# Patient Record
Sex: FEMALE | Race: WHITE | NOT HISPANIC OR LATINO | Employment: STUDENT | URBAN - METROPOLITAN AREA
[De-identification: names, ages, dates, MRNs, and addresses within clinical notes are randomized per-mention and may not be internally consistent; named-entity substitution may affect disease eponyms.]

---
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
## 2024-02-27 ENCOUNTER — TELEPHONE (OUTPATIENT)
Dept: TRANSPLANT | Facility: CLINIC | Age: 24
End: 2024-02-27
Payer: MEDICARE

## 2024-02-29 ENCOUNTER — TELEPHONE (OUTPATIENT)
Dept: TRANSPLANT | Facility: CLINIC | Age: 24
End: 2024-02-29
Payer: MEDICARE

## 2024-03-04 ENCOUNTER — TELEPHONE (OUTPATIENT)
Dept: TRANSPLANT | Facility: CLINIC | Age: 24
End: 2024-03-04
Payer: MEDICARE

## 2024-03-05 DIAGNOSIS — Z76.82 ORGAN TRANSPLANT CANDIDATE: Primary | ICD-10-CM

## 2024-03-19 ENCOUNTER — TELEPHONE (OUTPATIENT)
Dept: TRANSPLANT | Facility: CLINIC | Age: 24
End: 2024-03-19
Payer: MEDICARE

## 2024-03-20 ENCOUNTER — TELEPHONE (OUTPATIENT)
Dept: TRANSPLANT | Facility: CLINIC | Age: 24
End: 2024-03-20
Payer: MEDICARE

## 2024-03-20 NOTE — TELEPHONE ENCOUNTER
MA notes per Adherence form     PD PT    FOR THE PAST THREE MONTHS:    0-AMA's  0-No-shows    No concerns with care giving, transportation, or mental health    Per adherence form pts mother,brother,and father are very supportive.     Scanned in pt's media    Emily Cruz  Abdominal Transplant MA

## 2024-03-28 ENCOUNTER — HOSPITAL ENCOUNTER (OUTPATIENT)
Dept: RADIOLOGY | Facility: HOSPITAL | Age: 24
Discharge: HOME | End: 2024-03-28
Payer: MEDICARE

## 2024-03-28 ENCOUNTER — HOSPITAL ENCOUNTER (OUTPATIENT)
Dept: RADIOLOGY | Facility: HOSPITAL | Age: 24
Discharge: HOME | End: 2024-03-28
Attending: NURSE PRACTITIONER
Payer: MEDICARE

## 2024-03-28 ENCOUNTER — OFFICE VISIT (OUTPATIENT)
Dept: TRANSPLANT | Facility: CLINIC | Age: 24
End: 2024-03-28
Payer: MEDICARE

## 2024-03-28 ENCOUNTER — DOCUMENTATION ONLY (OUTPATIENT)
Dept: TRANSPLANT | Facility: CLINIC | Age: 24
End: 2024-03-28
Payer: MEDICARE

## 2024-03-28 VITALS
RESPIRATION RATE: 18 BRPM | TEMPERATURE: 97 F | OXYGEN SATURATION: 100 % | BODY MASS INDEX: 22.84 KG/M2 | HEIGHT: 62 IN | WEIGHT: 124.13 LBS | SYSTOLIC BLOOD PRESSURE: 120 MMHG | HEART RATE: 71 BPM | DIASTOLIC BLOOD PRESSURE: 82 MMHG

## 2024-03-28 DIAGNOSIS — Z76.82 ORGAN TRANSPLANT CANDIDATE: ICD-10-CM

## 2024-03-28 DIAGNOSIS — D63.1 ANEMIA IN CHRONIC KIDNEY DISEASE, ON CHRONIC DIALYSIS: ICD-10-CM

## 2024-03-28 DIAGNOSIS — N18.6 ANEMIA IN CHRONIC KIDNEY DISEASE, ON CHRONIC DIALYSIS: ICD-10-CM

## 2024-03-28 DIAGNOSIS — N18.6 HYPERPARATHYROIDISM DUE TO ESRD ON DIALYSIS: ICD-10-CM

## 2024-03-28 DIAGNOSIS — Z99.2 ESRD ON PERITONEAL DIALYSIS: ICD-10-CM

## 2024-03-28 DIAGNOSIS — N18.6 BENIGN HYPERTENSION WITH ESRD (END-STAGE RENAL DISEASE): ICD-10-CM

## 2024-03-28 DIAGNOSIS — Z01.818 PRE-TRANSPLANT EVALUATION FOR CHRONIC KIDNEY DISEASE: Primary | Chronic | ICD-10-CM

## 2024-03-28 DIAGNOSIS — N05.1 FSGS (FOCAL SEGMENTAL GLOMERULOSCLEROSIS): ICD-10-CM

## 2024-03-28 DIAGNOSIS — N25.81 HYPERPARATHYROIDISM DUE TO ESRD ON DIALYSIS: ICD-10-CM

## 2024-03-28 DIAGNOSIS — N18.6 ESRD ON PERITONEAL DIALYSIS: ICD-10-CM

## 2024-03-28 DIAGNOSIS — T86.12 FAILED KIDNEY TRANSPLANT: ICD-10-CM

## 2024-03-28 DIAGNOSIS — Z99.2 ANEMIA IN CHRONIC KIDNEY DISEASE, ON CHRONIC DIALYSIS: ICD-10-CM

## 2024-03-28 DIAGNOSIS — I12.0 BENIGN HYPERTENSION WITH ESRD (END-STAGE RENAL DISEASE): ICD-10-CM

## 2024-03-28 DIAGNOSIS — Z99.2 HYPERPARATHYROIDISM DUE TO ESRD ON DIALYSIS: ICD-10-CM

## 2024-03-28 PROBLEM — E89.2 HYPOPARATHYROIDISM AFTER PROCEDURE: Chronic | Status: RESOLVED | Noted: 2017-09-06 | Resolved: 2024-03-28

## 2024-03-28 PROBLEM — N18.9 CKD (CHRONIC KIDNEY DISEASE): Status: RESOLVED | Noted: 2017-08-20 | Resolved: 2024-03-28

## 2024-03-28 PROBLEM — Z98.890 S/P PERICARDIOCENTESIS: Status: RESOLVED | Noted: 2017-09-06 | Resolved: 2024-03-28

## 2024-03-28 PROBLEM — E05.90 HYPERTHYROIDISM: Chronic | Status: RESOLVED | Noted: 2017-09-06 | Resolved: 2024-03-28

## 2024-03-28 PROBLEM — D84.9 IMMUNOCOMPROMISED: Status: RESOLVED | Noted: 2017-08-20 | Resolved: 2024-03-28

## 2024-03-28 PROBLEM — D84.9 IMMUNOSUPPRESSION: Status: RESOLVED | Noted: 2017-09-06 | Resolved: 2024-03-28

## 2024-03-28 PROBLEM — R50.9 FEVER: Status: RESOLVED | Noted: 2017-09-08 | Resolved: 2024-03-28

## 2024-03-28 PROBLEM — I31.2 HEMOPERICARDIUM, NOT ELSEWHERE CLASSIFIED: Status: RESOLVED | Noted: 2017-09-06 | Resolved: 2024-03-28

## 2024-03-28 PROBLEM — I10 SEVERE UNCONTROLLED HYPERTENSION: Status: RESOLVED | Noted: 2017-08-20 | Resolved: 2024-03-28

## 2024-03-28 PROBLEM — J90 PLEURAL EFFUSION, LEFT: Status: RESOLVED | Noted: 2017-09-07 | Resolved: 2024-03-28

## 2024-03-28 PROCEDURE — 99214 OFFICE O/P EST MOD 30 MIN: CPT | Mod: PBBFAC,25,TXP | Performed by: NURSE PRACTITIONER

## 2024-03-28 PROCEDURE — 99204 OFFICE O/P NEW MOD 45 MIN: CPT | Mod: S$PBB,TXP,, | Performed by: TRANSPLANT SURGERY

## 2024-03-28 PROCEDURE — 99999 PR PBB SHADOW E&M-EST. PATIENT-LVL IV: CPT | Mod: PBBFAC,TXP,, | Performed by: NURSE PRACTITIONER

## 2024-03-28 PROCEDURE — 72170 X-RAY EXAM OF PELVIS: CPT | Mod: 26,TXP,, | Performed by: RADIOLOGY

## 2024-03-28 PROCEDURE — 99203 OFFICE O/P NEW LOW 30 MIN: CPT | Mod: S$PBB,TXP,, | Performed by: REGISTERED NURSE

## 2024-03-28 PROCEDURE — 76776 US EXAM K TRANSPL W/DOPPLER: CPT | Mod: 26,TXP,, | Performed by: RADIOLOGY

## 2024-03-28 PROCEDURE — 76770 US EXAM ABDO BACK WALL COMP: CPT | Mod: TC,TXP

## 2024-03-28 PROCEDURE — 71046 X-RAY EXAM CHEST 2 VIEWS: CPT | Mod: TC,TXP

## 2024-03-28 PROCEDURE — 99205 OFFICE O/P NEW HI 60 MIN: CPT | Mod: S$PBB,TXP,, | Performed by: NURSE PRACTITIONER

## 2024-03-28 PROCEDURE — 93978 VASCULAR STUDY: CPT | Mod: TC,TXP

## 2024-03-28 PROCEDURE — 72170 X-RAY EXAM OF PELVIS: CPT | Mod: TC,TXP

## 2024-03-28 PROCEDURE — 76770 US EXAM ABDO BACK WALL COMP: CPT | Mod: 26,TXP,, | Performed by: RADIOLOGY

## 2024-03-28 PROCEDURE — 93978 VASCULAR STUDY: CPT | Mod: 26,TXP,, | Performed by: RADIOLOGY

## 2024-03-28 PROCEDURE — 76776 US EXAM K TRANSPL W/DOPPLER: CPT | Mod: TC,TXP

## 2024-03-28 PROCEDURE — 71046 X-RAY EXAM CHEST 2 VIEWS: CPT | Mod: 26,TXP,, | Performed by: RADIOLOGY

## 2024-03-28 RX ORDER — CALCIUM CARBONATE 200(500)MG
2000 TABLET,CHEWABLE ORAL
COMMUNITY
Start: 2023-05-04 | End: 2024-05-03

## 2024-03-28 RX ORDER — CALCITRIOL 0.25 UG/1
0.75 CAPSULE ORAL DAILY
COMMUNITY
Start: 2024-02-22 | End: 2025-02-21

## 2024-03-28 RX ORDER — VIT B COMP NO.3/FOLIC/C/BIOTIN 1 MG-60 MG
1 TABLET ORAL EVERY MORNING
COMMUNITY
Start: 2024-02-06

## 2024-03-28 RX ORDER — LOSARTAN POTASSIUM 100 MG/1
100 TABLET ORAL DAILY
COMMUNITY
Start: 2023-06-05 | End: 2024-06-04

## 2024-03-28 RX ORDER — HEPARIN SODIUM 1000 [USP'U]/ML
5000 INJECTION, SOLUTION INTRAVENOUS; SUBCUTANEOUS
COMMUNITY
Start: 2024-02-22

## 2024-03-28 RX ORDER — NAPROXEN SODIUM 220 MG/1
81 TABLET, FILM COATED ORAL DAILY
COMMUNITY
Start: 2023-06-05

## 2024-03-28 RX ORDER — METOPROLOL SUCCINATE 100 MG/1
150 TABLET, EXTENDED RELEASE ORAL 2 TIMES DAILY
COMMUNITY
Start: 2023-08-04 | End: 2024-08-03

## 2024-03-28 RX ORDER — HYDRALAZINE HYDROCHLORIDE 50 MG/1
50 TABLET, FILM COATED ORAL 2 TIMES DAILY
COMMUNITY
Start: 2024-01-17

## 2024-03-28 RX ORDER — CINACALCET 30 MG/1
30 TABLET, FILM COATED ORAL 2 TIMES DAILY WITH MEALS
COMMUNITY
Start: 2024-02-22 | End: 2025-02-21

## 2024-03-28 RX ORDER — FOLIC ACID 1 MG/1
1000 TABLET ORAL DAILY
COMMUNITY

## 2024-03-28 RX ORDER — METHOXY POLYETHYLENE GLYCOL-EPOETIN BETA 100 UG/.3ML
150 INJECTION, SOLUTION INTRAVENOUS
COMMUNITY
Start: 2024-01-17

## 2024-03-28 NOTE — PROGRESS NOTES
PHARM.D. PRE-TRANSPLANT NOTE:    This patient's medication therapy was evaluated as part of her pre-transplant evaluation.      The following general pharmacologic concerns were noted: Aspirin will increase periop bleeding risk; previous kidney transplant from 2015 ([facility]) and 2019 ([facility]);     The following concerns for post-operative pain management were noted: N/A    The following pharmacologic concerns related to HCV therapy were noted: N/A      This patient's medication profile was reviewed for considerations for DAA Hepatitis C therapy:    [x]  No current inducers of CYP 3A4 or PGP  [x]  No amiodarone on this patient's EMR profile in the last 24 months  [x]  No past or current atrial fibrillation on this patient's EMR profile       Current Outpatient Medications   Medication Sig Dispense Refill    aspirin 81 MG Chew Take 81 mg by mouth once daily.      calcitRIOL (ROCALTROL) 0.25 MCG Cap Take 0.75 mcg by mouth once daily.      calcium carbonate (TUMS) 200 mg calcium (500 mg) chewable tablet Take 2,000 mg by mouth 3 (three) times daily with meals.      cinacalcet (SENSIPAR) 30 MG Tab Take 30 mg by mouth 2 (two) times daily with meals.      epoetin beta, methoxy peg (MIRCERA) 100 mcg/0.3 mL Syrg Inject 150 mcg as directed every 30 days.      folic acid (FOLVITE) 1 MG tablet Take 1,000 mcg by mouth once daily.      heparin sodium,porcine (HEPARIN, PORCINE,) 1,000 unit/mL injection 5,000 Units by Intra-Catheter route as needed. use as directed      hydrALAZINE (APRESOLINE) 50 MG tablet Take 50 mg by mouth 2 (two) times daily.      losartan (COZAAR) 100 MG tablet Take 100 mg by mouth once daily.      metoprolol succinate (TOPROL-XL) 100 MG 24 hr tablet Take 150 mg by mouth 2 (two) times daily.      pantoprazole (PROTONIX) 40 MG tablet       GOPAL-TORIE RX 1- mg-mg-mcg Tab Take 1 tablet by mouth every morning.       No current facility-administered medications for this visit.           I am available  for consultation and can be contacted, as needed by the other members of the Transplant team.

## 2024-03-28 NOTE — PROGRESS NOTES
Transplant Nephrology  Kidney Transplant Recipient Evaluation    Referring Physician: Bryan Wells  Current Nephrologist: Bryan Wells    Subjective:   CC:  Initial evaluation of kidney transplant candidacy.    HPI:  Ms. Gabriel is a 23 y.o. year old White female who has presented to be evaluated as a potential kidney transplant recipient.  She has ESRD secondary to FSGS and failed kidney txp x2.  (Txp #1 ~  at [facility] and txp #2 2020 at [place], [place]).   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 She received a renal transplant at [facility] in [place] a year ago in 2020. She is noted to have experienced a severe episode of rejection in May 2021 and has not recovered significant function since that time.  She was evaluated for declining renal function in 2021 and has opted for peritoneal dialysis. She had placement of peritoneal dialysis catheter 2021. She initiated peritoneal dialysis  on 1/10/2022.  Patient reports that she is tolerating dialysis well. She has a PD catheter for dialysis access.      Pt reports txp #1 at [facility] and txp #2 at [place], [place]   Pt reports both kidney txp remain in place, She is no longer on IS meds.     Had a bone marrow bx with txp #1 d/t leukocytosis--reports it was benign   She does not appear frail.   Donors: no, discussed living donation       Extracted 2017 ([facility])   Patient developed nephrotic syndrome ~ age 12 and  was admitted to [facility]  . 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 Approximately ,  she received a  donor transplant and did well for first year,     Her  Mom had to move from [facility] in [place] to near [place] and patient was staying with brothers over 18 years of age. She started missing doses of medications and within a year lost her kidney. She returned to PD 3 months ago.  Her BP was still uncontrolled and had a PRES/ seizure requiring PICU admission last month.     Txp kidney bx : 6/22/2021 ([facility])       DIAGNOSIS:  Acute T-Cell Mediated Rejection, [place]ff 2A.  Acute T-Cell Mediated Rejection, Banff 1B.       Seizure and HX PRES  Extracted 10/9/2017  Progress Notes    Francesca Molina MD - 10/09/2017   PEDIATRIC NEUROLOGY: CLINIC CONSULTATION   history of provoked seizures,  total of  4 seizures in her life, all secondary to hypocalcemia and/or severe hypertension. last seizure was in early August 2017, in the setting of hypertensive emergency.  She was previously seen by [facility] and was on Keppra 250mg for several months following a seizure, however this was stopped as her seizures were felt to be provoked. She has been off keppra since December 2015.     Per Dr Mathis's note, she has a history of PRES There has been a concern of noncompliance  - Provoked seizures and  antiepileptic medications are not indicated at this time.  Francesca Chairez MD      Recurrent UTIs 9/2/22   Pt underwent Urodynamic studies showing a decreased bladder capacity of 850 cc.   Detrusor pressures were around 50 cmH2O at her capacity.   Patient was unable to urinate once her bladder had capacity. Her catheters had to be removed in order for her to urinate. Even so, she still had to be intermittently Anterior bladder.  Good compliance bladder noted  but her capacity was low, which can be seen with anuria. At this point, would recommend that she go ahead and get her transplant. Would recommend that she start on oxybutynin immediately following her her surgery (on postop day 1). Would like to repeat the urodynamics once she is able to have a larger bladder capacity to assess for a hostile bladder at larger volumes   Electronically signed by Schexnayder, Kaitlen Sicard, MD at 09/02/2022 11/29/21 /ID Nikolas Rodriguez   4 episodes of UTI since transplant.  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  During these episodes her creatinine has persistently worsened. She is still producing urine. Because of these recurrent episodes she was started on doxycycline antibiotic suppression on 11/1/21.        Past Medical and Surgical History: Ms. Gabriel  has a past medical history of Anemia, Encounter for blood transfusion, FSGS (focal segmental glomerulosclerosis), Hypertension, Hyperthyroidism, and Kidney transplant status.  She has a past surgical history that includes Kidney transplant; Nephrectomy; Bone marrow biopsy; and Peritoneal catheter removal.    Past Social and Family History: Ms. Gabriel reports that she has never smoked. She does not have any smokeless tobacco history on file. She reports that she does not drink alcohol and does not use drugs. Her family history includes Arrhythmia in her father; Asthma in her maternal grandmother; Diabetes in her father and maternal grandmother; Heart disease in her maternal grandmother.    Past Medical History:   Diagnosis Date    Anemia     Encounter for blood transfusion     FSGS (focal segmental glomerulosclerosis)     Hypertension     Hyperthyroidism     Kidney transplant status     Kidney Rejection transplant kidney       Review of Systems   Constitutional:  Negative for activity change, appetite change, chills, fatigue, fever and unexpected weight change.   HENT:  Negative for congestion, facial swelling, postnasal drip, rhinorrhea, sinus pressure, sore throat and trouble swallowing.    Eyes:  Negative for pain, redness and visual disturbance.   Respiratory:  Negative for cough, chest tightness, shortness of breath and wheezing.    Cardiovascular: Negative.  Negative for chest pain, palpitations and leg swelling.   Gastrointestinal:  Negative for abdominal pain, diarrhea, nausea and vomiting.   Genitourinary:  Positive for decreased urine volume (anuric).  "Negative for dysuria, flank pain and urgency.   Musculoskeletal:  Negative for gait problem, neck pain and neck stiffness.   Skin:  Negative for rash.   Allergic/Immunologic: Positive for immunocompromised state. Negative for environmental allergies and food allergies.   Neurological:  Negative for dizziness, weakness, light-headedness and headaches.   Psychiatric/Behavioral:  Negative for agitation and confusion. The patient is not nervous/anxious.        Objective:   Blood pressure 120/82, pulse 71, temperature 97.3 °F (36.3 °C), temperature source Tympanic, resp. rate 18, height 5' 1.93" (1.573 m), weight 56.3 kg (124 lb 1.9 oz), SpO2 100 %.body mass index is 22.75 kg/m².    Physical Exam  Constitutional:       Appearance: Normal appearance. She is well-developed.   HENT:      Head: Normocephalic.      Nose: Nose normal.   Eyes:      Conjunctiva/sclera: Conjunctivae normal.      Pupils: Pupils are equal, round, and reactive to light.   Cardiovascular:      Rate and Rhythm: Normal rate and regular rhythm.      Heart sounds: Normal heart sounds.   Pulmonary:      Effort: Pulmonary effort is normal.      Breath sounds: Normal breath sounds.   Abdominal:      General: Bowel sounds are normal.      Palpations: Abdomen is soft. There is no hepatomegaly or splenomegaly.      Comments: PD catheter    Musculoskeletal:      Cervical back: Normal range of motion and neck supple.   Skin:     General: Skin is warm and dry.   Neurological:      Mental Status: She is alert and oriented to person, place, and time.   Psychiatric:         Behavior: Behavior normal.         Labs:  Lab Results   Component Value Date    WBC 6.28 03/28/2024    HGB 7.1 (L) 03/28/2024    HCT 23.4 (L) 03/28/2024     03/28/2024    K 4.5 03/28/2024    CL 97 03/28/2024    CO2 27 03/28/2024    BUN 33 (H) 03/28/2024    CREATININE 13.0 (H) 03/28/2024    EGFRNORACEVR 3.7 (A) 03/28/2024    CALCIUM 8.5 (L) 03/28/2024    PHOS 4.8 (H) 03/28/2024    MG 1.9 " "09/09/2017    ALBUMIN 2.9 (L) 03/28/2024    AST 15 03/28/2024    ALT 12 03/28/2024    .2 (H) 03/28/2024       No results found for: "PREALBUMIN", "BILIRUBINUA", "GGT", "AMYLASE", "LIPASE", "PROTEINUA", "NITRITE", "RBCUA", "WBCUA"    No results found for: "HLAABCTYPE"    Labs were reviewed with the patient.    Assessment:     1. Pre-transplant evaluation for chronic kidney disease    2. ESRD on peritoneal dialysis    3. Failed kidney transplant x2    4. FSGS (focal segmental glomerulosclerosis)    5. Benign hypertension with ESRD (end-stage renal disease)    6. Anemia in chronic kidney disease, on chronic dialysis    7. Hyperparathyroidism due to ESRD on dialysis        Plan:   Anemia: H/H: 7.9/16.7--Fax labs to dialysis/nephrologist concerning   optimizing--mgmt deferred  -Discuss urology recs post txp with committee at presentation   -UTD GYN/PAP per guidelines --follows with Dr Mckay at [facility]     Transplant Candidacy:   Based on available information, Ms. Gabriel is a high-risk kidney transplant candidate d/t HX 2 failed kidney txp and long time exposure to IS. .   Meets center eligibility for accepting HCV+ donor offer - Yes.  Patient educated on HCV+ donors. Anna is willing to accept HCV+ donor offer - Yes   Patient is a candidate for KDPI > 85 kidney donor offer - No.  Final determination of transplant candidacy will be made once workup is complete and reviewed by the selection committee.    Patient advised that it is recommended that all transplant candidates, and their close contacts and household members receive Covid vaccination.    UNOS Patient Status  Functional Status: 60% - Requires occasional assistance but is able to care for needs     Citlali Hatfield NP         "

## 2024-03-28 NOTE — PROGRESS NOTES
PRE-TRANSPLANT INFECTIOUS DISEASE CONSULT    Reason for Visit:  Pre-transplant evaluation  Referring Provider: Dr. Bryan Wells     History of Present Illness:    23 y.o. female with a history of ESRD on PD, hx renal transplant (12/2015) cb rejection (2016 onward), FSGS presents for pre-kidney transplant evaluation. Yes, pt has history of peritonitis, lastly in October 2023. Pt did not have full catheter exchange. Pt received intraperitoneal antibiotics. Pt with hx of cdiff in 2017.     Infectious History:  Recent hospital admissions: No  Recent infections: No  Recent or current antibiotic use: No  History of recurrent infections *(sinus / pneumonia / UTI / SBP)*: not recurrent. Pt had peritonitis x2, though last in October of 2023.   Recent dental infections, issues or procedures: No  History of chicken pox: Yes  History of shingles: No  History of STI: No  History of COVID infection: Yes    History of Immunosuppression:  Prior chemotherapy / immunosuppression: Yes, from 8976-3526  Prior transplant: Yes x2, dec 2, 2015 and dec 28, 2019.   History of splenectomy: No    Tuberculosis:  Prior screening for latent TB: Yes  Prior diagnosis of latent TB: No  Risk factors for TB *known exposure, incarceration, homelessness*: No    Geographical exposures:  Currently lives in [facility] with three roommates   Lived in the following states: [place]   Lived or travelled to the [facility] [place]: No  International travel: Yes, [place], [place]  Travel-associated illness: No    Social/Environmental:  Occupation:  college student, art major   Pets: Yes, 3 dogs and 2 cats in household, though pt has 2 dogs that she interacts with.   Livestock: No  Fishing / hunting: No  Hobbies: NA  Water: [place] water  Consumption of raw/undercooked meat or seafood?  Yes  Tobacco: No  Alcohol: Yes, 1 drink a year or so  Recreational drug use:  No  Sexual partners: NA      Past Histories:   Past Medical History:   Diagnosis Date     Anemia     Encounter for blood transfusion     FSGS (focal segmental glomerulosclerosis)     Hypertension     Hyperthyroidism     Kidney transplant status     Kidney Rejection transplant kidney     Past Surgical History:   Procedure Laterality Date    BONE MARROW BIOPSY      KIDNEY TRANSPLANT      NEPHRECTOMY      Transplant Kidney    PERITONEAL CATHETER REMOVAL       Family History   Problem Relation Age of Onset    Diabetes Father     Arrhythmia Father     Asthma Maternal Grandmother     Diabetes Maternal Grandmother     Heart disease Maternal Grandmother     Thyroid disease Neg Hx     Autoimmune disease Neg Hx      Social History     Tobacco Use    Smoking status: Never   Substance Use Topics    Alcohol use: No     Alcohol/week: 0.0 standard drinks of alcohol    Drug use: Never     Review of patient's allergies indicates:  No Known Allergies      Immunization History:  Received all childhood vaccines: Yes  All household members receive annual flu vaccine: Yes  All household members are up to date on COVID vaccine: Yes      Current antibiotics:  Antibiotics (From admission, onward)      None              Review of Systems  Review of Systems   Constitutional: Negative for chills, fever and weight loss.   Respiratory:  Negative for cough and hemoptysis.    Skin:  Negative for poor wound healing, rash and suspicious lesions.   Gastrointestinal:  Negative for diarrhea, nausea and vomiting.   Genitourinary:  Negative for dysuria and flank pain.          Objective  Physical Exam  Vitals reviewed.   Constitutional:       General: She is not in acute distress.     Appearance: Normal appearance. She is not ill-appearing.   HENT:      Nose: Nose normal.      Mouth/Throat:      Mouth: Mucous membranes are moist.      Pharynx: Oropharynx is clear.   Eyes:      General:         Right eye: No discharge.         Left eye: No discharge.      Conjunctiva/sclera: Conjunctivae normal.   Pulmonary:      Effort: Pulmonary effort is  "normal. No respiratory distress.      Breath sounds: No stridor.   Abdominal:      General: Abdomen is flat. There is no distension.      Palpations: Abdomen is soft.   Musculoskeletal:         General: Normal range of motion.      Cervical back: Normal range of motion.      Right lower leg: No edema.      Left lower leg: No edema.   Skin:     General: Skin is warm.      Findings: No erythema or rash.   Neurological:      General: No focal deficit present.      Mental Status: She is alert and oriented to person, place, and time.      Motor: No weakness.      Gait: Gait normal.   Psychiatric:         Mood and Affect: Mood normal.         Behavior: Behavior normal.         Thought Content: Thought content normal.         Judgment: Judgment normal.           Labs:    CBC:   Lab Results   Component Value Date    WBC 6.28 03/28/2024    HGB 7.1 (L) 03/28/2024    HCT 23.4 (L) 03/28/2024    MCV 96 03/28/2024     03/28/2024    GRAN 3.0 03/28/2024    GRAN 48.2 03/28/2024    LYMPH 1.1 03/28/2024    LYMPH 18.0 03/28/2024    MONO 0.5 03/28/2024    MONO 7.8 03/28/2024    EOSINOPHIL 24.4 (H) 03/28/2024       Syphilis screening: No results found for: "RPR", "PRPQ", "FTAABS"     TB screening: No results found for: "TBGOLDPLUS", "TSPOTSCREN"    HIV screening:   Lab Results   Component Value Date    [id number] Non-reactive 03/28/2024       Strongyloides IgG: No results found for: "STRONGANTIGG"    Hepatitis Serologies:   Lab Results   Component Value Date    HEPAIGG Reactive 03/28/2024    HEPBCAB Non-reactive 03/28/2024    HEPBSAB 569.53 03/28/2024    HEPBSAB Reactive 03/28/2024    HEPCAB Non-reactive 03/28/2024        Varicella IgG: No results found for: "VARICELLAINT"      Immunization History   Administered Date(s) Administered    COVID-19, MRNA, LN-S, PF (Pfizer) (Purple Cap) 02/19/2021, 03/12/2021, 10/18/2021          Assessment and Plan    1. Risks of Infection: Available serologies were reviewed. No unusual risks of " infection or significant barriers to transplantation were identified from the infectious disease standpoint given the information available at this time.    - Acute infectious issues: None   - Pending serologies: Quantiferon gold / T-spot, RPR, Strongyloides IgG, and VZV IgG   - Please call if any pending serologic testing is positive.    2. Immunizations:  Based on the patient's immunization history and serologies, the following immunizations are recommended:  - Hepatitis A    Patient does have immunity to hepatitis A    Vaccination ordered today: No. Reason for not ordering: immunity demonstrated on serology   - Hepatitis B    Patient does have immunity to hepatitis B    Vaccination ordered today: No. Reason for not ordering: Immunity   - COVID    Current [facility] vaccination recommendations were discussed with the patient   - Annual high dose influenza     Vaccination ordered today: Yes   - Prevnar 20    Vaccination ordered today: Yes   - Tdap    Vaccination ordered today: No. Reason for not ordering: vaccination up to date   - Shingrix    Vaccination ordered today: Yes    Recommended Pre-Transplant Immunization Schedule   Vaccine  0m 1m 2m 6m   Pneumococcal conjugate vaccine (Prevnar 20) X      Tetanus-diphtheria-pertussis (Tdap)* X      Hepatitis A Vaccine (Havrix)** X   X   Hepatitis B Vaccine (Heplisav)** X X     Influenza (annual) X      Zoster Recombinant Vaccine (Shingrix) X  X           *Administer booster every 10 years.       **Administer if no immunity demonstrated on serologies               Patient will receive vaccines at local pharmacy. A written prescription was provided for all vaccine doses.     3. Counseling:   I discussed with the patient the risk for increased susceptibility to infections following transplantation including increased risk for infection right after transplant and if rejection should occur.  The patient has been counseled on the importance of vaccinations to decrease risk of  infection and severe illness. Specific guidance has been provided to the patient regarding the patient's occupation, hobbies and activities to avoid future infectious complications.     4. Transplant Candidacy: Based on available information, there are no identified significant barriers to transplantation from an infectious disease standpoint.  Final determination of transplant candidacy will be made once evaluation is complete and reviewed by the Selection Committee.      Follow up with infectious disease as needed.       The total time for evaluation and management services performed on 03/28/2024 was greater than 30 minutes.

## 2024-03-28 NOTE — PROGRESS NOTES
Transplant Recipient Adult Psychosocial Assessment  2015 [facility],  donor kidney transplant,   2019 [facility],  donor kidney transplant  Pt is on PD at this time.    Anna Gabriel  [address]  [address]  [place] [address]  Telephone Information:   Mobile [phone]   Home  [phone] (home)  Work  There is no work phone number on file.  E-mail  [email or web address]    Sex: female  YOB: 2000  Age: 23 y.o.    Encounter Date: 3/28/2024  U[place] Citizen: yes  Primary Language: English   Needed: no  Transportation: pt reports drives self/own car    Emergency Contact:  Francesca Hatfield, 40 yo mother, [place], does drive/own car, works full time as . [phone]    Family/Social Support:   Number of dependents/: pt denies  Marital history: pt reports single, never   Other family dynamics: Pt reports both parents are living with mother Francesca Hatfield ([place]) assisting with transplant. Pt reports 2 brothers with 1 brother Edouard Blanco ([place]) will be assisting with transplant. Pt reports supportive uncle Beni Estevez ([place]) will be assisting with transplant.    Household Composition:  Pt reports lives in [facility] [place] with 3 roommates Carolyn Shipman, Florencia and Naomy. Pt reports is not close with her college roommates and they will not be assisting with transplant.    Do you and your caregivers have access to reliable transportation? yes  PRIMARY CAREGIVER: Francesca aHtfield, mother, will be primary caregiver, phone number [phone]      provided in-depth information to patient and caregiver regarding pre- and post-transplant caregiver role.   strongly encourages patient and caregiver to have concrete plan regarding post-transplant care giving, including back-up caregiver(s) to ensure care giving needs are met as needed.    Patient and Caregiver states understanding  all aspects of caregiver role/commitment and is able/willing/committed to being caregiver to the fullest extent necessary.    Patient and Caregiver verbalizes understanding of the education provided today and caregiver responsibilities.         remains available. Patient and Caregiver agree to contact  in a timely manner if concerns arise.      Able to take time off work without financial concerns: yes.     Additional Significant Others who will Assist with Transplant:  Edouard Blanco, 24 yo brother, [place], does drive/own car, works full time as recreational therapist . [phone]  Beni Estevez 42 yo uncle, [place], does drive/own car, works full time as network supervisor for [employer]. [phone]    Living Will: no  Healthcare Power of : no  Advance Directives on file: <<no information> per medical record.  Verbally reviewed LW/HCPA information.   provided patient with copy of LW/HCPA documents and provided education on completion of forms.    Living Donors: Education and resource information given to patient.    Highest Education Level: Attended College/Technical School Pt reports in last semester as Art Major and Marketing Minor at [employer] [place]. Pt reports will be graduating college May 2024  Reading Ability: college  Reports difficulty with: N/A  Learns Best By:  multisensory     Status: no  VA Benefits: no     Working for Income: No  If no, reason not working: Disability  Patient reports work history at [employer] ages 16 - 17 yo. 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 Pt reports ESRD and on PD at this time.    Monthly Income:  $914 disability check  Able to afford all costs now and if transplanted, including medications: yes  Patient and Caregiver  verbalizes understanding of personal responsibilities related to transplant costs and the importance of having a financial plan to ensure that patients transplant costs are fully covered.       provided fundraising information/education. Patient and Caregiververbalizes understanding.   remains available.    Insurance:   Payer/Plan Subscr  Sex Relation Sub. Ins. ID Effective Group Num   1. MEDICARE - [place] VIN CURIEL [birth year] Female Self [id number]                                    [address]   2. BLUE CROSS BL RIVAS [place] 1982 Female Child [id number]                                   [address] [address]     Primary Insurance (for UNOS reporting): Public Insurance - Medicare FFS (Fee For Service) ESRD Medicare book provided today.  Secondary Insurance (for UNOS reporting): Private Insurance BCBS through mother's job. Part D: Cigna. Pt reports [facility] for last transplant medicine prescriptions with no issues of affordability.  Patient and Caregiver verbalizes clear understanding that patient may experience difficulty obtaining and/or be denied insurance coverage post-surgery. This includes and is not limited to disability insurance, life insurance, health insurance, burial insurance, long term care insurance, and other insurances.      Patient and Caregiver also reports understanding that future health concerns related to or unrelated to transplantation may not be covered by patient's insurance.  Resources and information provided and reviewed.     Patient and Caregiver provides verbal permission to release any necessary information to outside resources for patient care and discharge planning.  Resources and information provided are reviewed.      Dialysis Adherence: Patient and Caregiver reports having high dialysis compliance with treatments and instructions within last 3 months.  3-20-24 Dialysis compliance update shows suitable dialysis  compliance.    Infusion Service: patient utilizing? Pt denies  Home Health: patient utilizing? Pt denies  DME: PD equipment and supplies  Pulmonary/Cardiac Rehab: pt denies  ADLS:  independent with self care and medication management (uses alarms for medication reminders) and does drive self/own car.    Adherence:   Previous history of medical non compliance with transplant medicines causing transplanted kidney rejection. Pt is on dialysis at this time. Pt reports her belief kidney rejection was from bladder infection that developed into a kidney infection which caused transplanted kidney failure/rejection. 3/20/24 dialysis compliance update shows suitable dialysis compliance within last 3 months. Adherence education and counseling provided. Recommend dialysis compliance update before transplant listing and surgery to show patient is maintaining medical compliance.     Per History Section:  Past Medical History:   Diagnosis Date    Anemia     Encounter for blood transfusion     FSGS (focal segmental glomerulosclerosis)     Hypertension     Hyperthyroidism     Kidney transplant status     Kidney Rejection transplant kidney     Social History     Tobacco Use    Smoking status: Never    Smokeless tobacco: Not on file   Substance Use Topics    Alcohol use: No     Alcohol/week: 0.0 standard drinks of alcohol     Social History     Substance and Sexual Activity   Drug Use Never     Social History     Substance and Sexual Activity   Sexual Activity Never       Per Today's Psychosocial:  Tobacco: none, patient denies any use.  Alcohol: none, patient denies any use.  Illicit Drugs/Non-prescribed Medications: none, patient denies any use.    Patient and Caregiver states clear understanding of the potential impact of substance use as it relates to transplant candidacy and is aware of possible random substance screening.  Substance abstinence/cessation counseling, education and resources provided and reviewed.      Arrests/DWI/Treatment/Rehab: patient denies    Psychiatric History:    Mental Health: Pt denies any mental health history or current mental health struggles. Pt denies any need for mental health referral at this time.  Psychiatrist/Counselor: pt denies  Medications:  pt denies  Suicide/Homicide Issues: pt denies any si/hi history at this time.  Safety at home: Pt reports living in safe home environment with no abuse at this time.    Knowledge: Patient and Caregiver states having clear understanding and realistic expectations regarding the potential risks and potential benefits of organ transplantation and organ donation and agrees to discuss with health care team members and support system members, as well as to utilize available resources and express questions and/or concerns in order to further facilitate the pt informed decision-making.  Resources and information provided and reviewed.    Patient and Caregiver is aware of [facility]'s affiliation and/or partnership with agencies in home health care, LTAC, SNF, [facility], and other hospitals and clinics.    Understanding: Patient and Caregiver reports having a clear understanding of the many lifetime commitments involved with being a transplant recipient, including costs, compliance, medications, lab work, procedures, appointments, concrete and financial planning, preparedness, timely and appropriate communication of concerns, abstinence (ETOH, tobacco, illicit non-prescribed drugs), adherence to all health care team recommendations, support system and caregiver involvement, appropriate and timely resource utilization and follow-through, mental health counseling as needed/recommended, and patient and caregiver responsibilities.  Social Service Handbook, resources and detailed educational information provided and reviewed.  Educational information provided.    Patient and Caregiver also reports current and expected compliance with health care regime and states having a  clear understanding of the importance of compliance.      Patient and Caregiver reports a clear understanding that risks and benefits may be involved with organ transplantation and with organ donation.       Patient and Caregiver also reports clear understanding that psychosocial risk factors may affect patient, and include but are not limited to feelings of depression, generalized anxiety, anxiety regarding dependence on others, post traumatic stress disorder, feelings of guilt and other emotional and/or mental concerns, and/or exacerbation of existing mental health concerns.  Detailed resources provided and discussed.      Patient and Caregiver agrees to access appropriate resources in a timely manner as needed and/or as recommended, and to communicate concerns appropriately.  Patient and Caregiver also reports a clear understanding of treatment options available.     Patient and Caregiver received education in a group setting.   reviewed education, provided additional information, and answered questions.    Feelings or Concerns: Pt reports high motivation to pursue kidney organ transplant at this time.    Coping: Identify Patient & Caregiver Strategies to [place]:   1. In the past, coping with major surgery and/or related stress - family support; enjoys making art work, especially ceramics.    2. Currently & Pre-transplant -  family support; enjoys making art work, especially ceramics.    3. At the time of surgery - family support   4. During post-Transplant & Recovery Period - family support    Goals: Pt reports hope for successful kidney organ transplant so she can discontinue dialysis and have healthier life. Patient referred to Vocational Rehabilitation.    Interview Behavior: Patient and Caregiver presents as alert and oriented x 4, pleasant, good eye contact, well groomed, recall good, concentration/judgement good, average intelligence, calm, communicative, cooperative, and asking and answering  questions appropriately. Pt's highly supportive mother Francesca in session with patient's permission.         Transplant Social Work - Candidacy  Assessment/Plan:     Psychosocial Suitability: Patient presents as medium to high risk candidate for kidney transplant at this time due to patient's significant documented medical history of medical non compliance which resulted in kidney organ transplant failure. Pt's 3-20-24 dialysis compliance update shows suitable dialysis compliance within last 3 months. Adherence education and counseling provided. Recommend dialysis compliance update before transplant listing and surgery to show patient is maintaining medical compliance. Pt reports having organ transplant caregiver/transportation plan, medical insurance plan and plan to afford transplant costs all in place.    Recommendations/Additional Comments: Recommend dialysis compliance update before transplant listing and surgery to show patient is maintaining medical compliance.     SW recommends that pt conduct fundraising to assist pt with pay for cost of medications, food, gas, and other transplant related needs.  SW recommends that pt remain aware of potential mental health concerns and contact the team if any concerns arise.  SW recommends that pt remain abstinent from tobacco, ETOH, and drug use.  SW supports pt's continued adherence. SW remains available to answer any questions or concerns that arise as the pt moves through the transplant process.      Final determination of transplant candidacy will be reviewed by the selection committee.      Yvette ABBASI LCSW

## 2024-03-28 NOTE — LETTER
April 1, 2024        Bryan Wells  [address]  [facility] [place] [address]  Phone: [phone]  Fax: [phone]             [facility] Buch[facility]- Transplant 1st Fl  [address]  [facility] [address]  Phone: [phone]   Patient: Anna Gabriel   MR Number: [id number]   YOB: 2000   Date of Visit: 3/28/2024       Dear Dr. Bryan Wells    Thank you for referring Anna Gabriel to me for evaluation. Attached you will find relevant portions of my assessment and plan of care.    If you have questions, please do not hesitate to call me. I look forward to following Anna Gabriel along with you.    Sincerely,    Citlali Hatfield, NP    Enclosure    If you would like to receive this communication electronically, please contact [email or web address] or [phone] to request MMIS Link access.    MMIS Link is a tool which provides read-only access to select patient information with whom you have a relationship. Its easy to use and provides real time access to review your patients record including encounter summaries, notes, results, and demographic information.    If you feel you have received this communication in error or would no longer like to receive these types of communications, please e-mail [email or web address]

## 2024-03-28 NOTE — PROGRESS NOTES
INITIAL PATIENT EDUCATION NOTE     Ms. Anna Gabriel was seen in pre-kidney transplant clinic for evaluation for kidney, kidney/pancreas or pancreas only transplant.  The patient attended an individual video education session that discussed/reviewed the following aspects of transplantation: evaluation including diagnostic and laboratory testing,(Chemistries, Hematology, Serologies including HIV and Hepatitis and HLA) required for transplantation and selection committee process, UNOS waitlist management/multiple listings, types of organs offered (KDPI < 85%, KDPI > 85%, PHS risk, DCD, HCV+, HIV+ for HIV+ recipients and enbloc/dual), financial aspects, surgical procedures, dietary instruction pre- and post-transplant, health maintenance pre- and post-transplant, post-transplant hospitalization and outpatient follow-up, potential to participate in a research protocol, and medication management and side effects.  A question and answer session was provided after the presentation.    The patient was seen by all members of the multi-disciplinary team to include: Nephrologist/GEOFF, Surgeon, , Transplant Coordinator, , Pharmacist and Dietician (if applicable).    The patient reviewed and signed all consents for evaluation which were witnessed and sent to scanning into the [facility] chart.    The patient was given an education book and plan for further evaluation based on her individual assessment.      Reviewed program requirement for complete COVID vaccination with documentation prior to listing.  COVID education information reviewed with patient. Patient encouraged to be up to date on all vaccinations.     The patient was informed that the transplant team would manage immediate post op pain. If the patient requires long term pain management, they will need to have that pain management addressed by their PCP or previous provider who wrote for long term pain medicines.    The patient was encouraged  to call with any questions or concerns.

## 2024-03-28 NOTE — PROGRESS NOTES
"Transplant Surgery  Kidney Transplant Recipient Evaluation    Referring Physician: Brayn Wells  Current Nephrologist: Bryan Wells    Subjective:     Reason for Visit: evaluate transplant candidacy    History of Present Illness: Anna Gabriel is a 23 y.o. year old female undergoing transplant evaluation.    Dialysis History: Anna is on peritoneal dialysis.      Transplant History: The patient has had two prior transplants.  She is very child-like and vague about her history.  Outside records state that the first kidney was lost after she started missing medication doses while living with older adult siblings, though she denies this.  The second kidney was done in Dec. 2019 and she returned to dialysis after a severe rejection in May 2020, though again she denies medication noncompliance and says that the kidney failed due to "a bladder infection that turned into a kidney infection".  Her mother also states that the second kidney failed from infection.    Of note, outside records indicate that her cPRA is 99%.    Etiology of Renal Disease: Other, Specify - Retransplant (based on medical records from referral).    External provider notes reviewed: Yes    Review of Systems  Objective:     Physical Exam:  Constitutional:   Vitals reviewed: yes   Well-nourished and well-groomed: yes  Eyes:   Sclerae icteric: no   Extraocular movements intact: yes  GI:    Bowel sounds normal: yes   Tenderness: no    If yes, quadrant/location: not applicable   Palpable masses: no    If yes, quadrant/location: not applicable   Hepatosplenomegaly: no   Ascites: no   Hernia: no    If yes, type/location: not applicable   Surgical scars: yes    If yes, bilateral prior kidney transplants and current PD catheter.  Resp:   Effort normal: yes   Breath sounds normal: yes    CV:   Regular rate and rhythm: yes   Heart sounds normal: yes   Femoral pulses normal: yes   Extremities edematous: no  Skin:   Rashes or lesions present: no    If yes, " describe:not applicable   Jaundice:: no    Musculoskeletal:   Gait normal: yes   Strength normal: yes  Psych:   Oriented to person, place, and time: yes   Affect and mood normal: yes    Additional comments: not applicable    Diagnostics:  The following labs have been reviewed: CBC  CMP  The following radiology images have been independently reviewed and interpreted:     Counseling: We provided Anna Gabriel with a group education session today.  We discussed kidney transplantation at length with her, including risks, potential complications, and alternatives in the management of her renal failure.  The discussion included complications related to anesthesia, bleeding, infection, primary nonfunction, and ATN.  I discussed the typical postoperative course, length of hospitalization, the need for long-term immunosuppression, and the need for long-term routine follow-up.  I discussed living-donor and -donor transplantation and the relative advantages and disadvantages of each.  I also discussed average waiting times for both living donation and  donation.  I discussed national and center-specific survival rates.  I also mentioned the potential benefit of multicenter listing to candidates listed with centers within more than one organ procurement organization.  All questions were answered.    Patient advised that it is recommended that all transplant candidates, and their close contacts and household members receive Covid vaccination.    Final determination of transplant candidacy will be made once evaluation is complete and reviewed by the Kidney & Kidney/Pancreas Selection Committee.    Coronavirus disease (COVID-19) caused by severe acute respiratory virus coronavirus 2 (SARS-C0V 2) is associated with increased mortality in solid organ transplant recipients (SOT) compared to non-transplant patients. Vaccine responses to vaccination are depressed against SARS-CoV2 compared to normal individuals but  improve with third vaccination doses. Vaccination prior to SOT provides both the best opportunity for transplant candidates to develop protective immunity and to reduce the risk of serious COVID19 infections post transplantation. Organ transplant candidates at [facility] will be required to receive SARS-CoV-2 vaccination prior to being listed with a an active status, whenever possible. Exceptions will be made for disability related reasons or for sincerely held Christian beliefs.          Transplant Surgery - Candidacy   Assessment/Plan:   Anna Gabriel has end stage renal disease (ESRD) on dialysis. I have concerns with her vague history and outside records indicating noncompliance, with little indication that she is now more reliable.  Also, it should be noted that she is highly sensitized with cPRA 99%.  Neither she nor her mother have ever heard anything about antibody sensitization, and they seemed shocked to here that she is unlikely to ever receive another transplant. Based on available information, Anna Gabriel is a high-risk kidney transplant candidate.     Additional testing to be completed according to the Written Order Guidelines for Adult Pre-kidney and Pancreas Transplant Evaluation (KI-02).  Interpretation of tests and discussion of patient management involves all members of the multidisciplinary transplant team.    Jeff Stern MD

## 2024-04-03 ENCOUNTER — DOCUMENTATION ONLY (OUTPATIENT)
Dept: TRANSPLANT | Facility: CLINIC | Age: 24
End: 2024-04-03
Payer: MEDICARE

## 2024-07-17 ENCOUNTER — TELEPHONE (OUTPATIENT)
Dept: TRANSPLANT | Facility: CLINIC | Age: 24
End: 2024-07-17
Payer: MEDICARE

## 2024-07-17 NOTE — TELEPHONE ENCOUNTER
Spoke to pt regarding 30 day letter for Stress Test. Pt stated she attempted to have it done three times but was unsuccessful due to blood pressure being too low, couldn't get an iv going. Pt stated she is going to [facility] which is closer to her to attempt to have it completed on 8/8. Pt advised we will need her or the doctor to fax records once completed. Pt also advised if she has it completed before then, let us know.

## 2024-08-26 ENCOUNTER — TELEPHONE (OUTPATIENT)
Dept: TRANSPLANT | Facility: CLINIC | Age: 24
End: 2024-08-26
Payer: MEDICARE

## 2024-08-26 NOTE — TELEPHONE ENCOUNTER
Spoke to pt regarding stress test. Test was completed on 8/8 at [facility]. Stress test in [place]. Pt stated her mother recently changed jobs and she currently does not have private insurance. Asked if that would be an issue and I explained I can not answer that question but I can transfer her to financial for assistance. Pt voiced understanding and was transferred to Waldemar Gallegos @ [phone].

## 2024-08-26 NOTE — TELEPHONE ENCOUNTER
Attempted to contact pt regarding incomplete stress test. Left detailed message with call back number.

## 2024-09-10 ENCOUNTER — TELEPHONE (OUTPATIENT)
Dept: TRANSPLANT | Facility: CLINIC | Age: 24
End: 2024-09-10
Payer: MEDICARE

## 2024-09-12 ENCOUNTER — SOCIAL WORK (OUTPATIENT)
Dept: TRANSPLANT | Facility: CLINIC | Age: 24
End: 2024-09-12
Payer: MEDICARE

## 2024-09-12 ENCOUNTER — TELEPHONE (OUTPATIENT)
Dept: TRANSPLANT | Facility: CLINIC | Age: 24
End: 2024-09-12
Payer: MEDICARE

## 2024-09-12 NOTE — PROGRESS NOTES
Pt to be presented in transplant selection committee 9-13-24.    Suitability: Patient presents as low to medium risk candidate for kidney transplant at this time due to patient's significant documented medical history of medical non compliance which resulted in kidney organ transplant failure. Adherence education and counseling provided. Pt's 3-20-24 and 9- dialysis compliance updates show suitable dialysis compliance within last 6 months. Recommend dialysis compliance update before transplant surgery to show patient is maintaining medical compliance. Pt reports having organ transplant caregiver/transportation plan, medical insurance plan and plan to afford transplant costs all in place.     Recommendations/Additional Comments: Recommend dialysis compliance update before transplant surgery to show patient is maintaining medical compliance.      SW recommends that pt conduct fundraising to assist pt with pay for cost of medications, food, gas, and other transplant related needs.  SW recommends that pt remain aware of potential mental health concerns and contact the team if any concerns arise.  SW recommends that pt remain abstinent from tobacco, ETOH, and drug use.  SW supports pt's continued adherence. SW remains available to answer any questions or concerns that arise as the pt moves through the transplant process.       Final determination of transplant candidacy will be reviewed by the selection committee.       Yvette Taylro MSW LCSW

## 2024-09-12 NOTE — TELEPHONE ENCOUNTER
MA notes per Adherence form     PD PT    FOR THE PAST THREE MONTHS:    0-AMA's  0-No-shows    No concerns with care giving, transportation, or mental health    Per adherence form, Pt will be an excellent candidate for transplant.  Pt is compliant with her treatment plan.     Scanned in pt's media    Emily Cruz  Abdominal Transplant MA

## 2024-09-13 ENCOUNTER — COMMITTEE REVIEW (OUTPATIENT)
Dept: TRANSPLANT | Facility: CLINIC | Age: 24
End: 2024-09-13
Payer: MEDICARE

## 2024-09-13 NOTE — LETTER
This communication is flagged as high priority.      September 13, 2024    Anna Gabriel  [address]  [place] [address]             Dear Jeromy Salcedo Eduardo J., MD (Inactive)    Patient: Anna Gabriel   MR Number: [id number]   YOB: 2000     A battery of tests must be done to determine if you are in suitable health to undergo a kidney transplant.  All  the recommended studies must be completed and received by the transplant team before you can be presented to the transplant selection committee. Once all your evaluation is complete the committee will then decide if you are a suitable transplant candidate.  The following studies need to be obtained at home:    _X__CT abdomen/pelvis with out contrast: ICD-10 code Z76.82.  this test is to assess surgical approach given that patient is being evaluated for a third kidney transplant      You and your doctor should feel free to contact us at any time, if there are questions or concerns about these tests or the transplant evaluation process.    Sincerely,    Kamila Dial MD  Medical Director, Kidney & Kidney/Pancreas Transplantation      [facility]  [address] [place] [id number]  [phone]    Cc: Dr. Manuel Wells

## 2024-09-13 NOTE — LETTER
This communication is flagged as high priority.    September 13, 2024    Anna Gabriel  1985 [address] [place] [address]        Dear Anna Gabriel:  MRN: [id number]    It is the duty of the [facility] Selection [facility] to determine which patients are candidates for a transplant. For this reason, our committee has the difficult task of evaluating patients to determine which ones have the greatest chance of having a successful transplant. We are aware of the magnitude of this responsibility, and we approach it with reverence and humility.    It is with regret I inform you that you are not approved as a transplant candidate due to  the need for additional testing and medical records review.  Will need to get a CT of the abdomen and pelvis without contrast to evaluate the blood supply to the kidneys .   Will need to obtain kidney biopsy reports and transplant notes from [facility] and [facility].  Based on this review, we have determined that at this time, you are not a candidate for a transplant at [facility].  When the above reports have been received and ready for committee review we will re-present your case to the Transplant Selection Committee for approval.    The selection committee carefully considers each patient's transplant candidacy and determines whether it is safe to proceed with transplantation on a case-by-case basis using established selection criteria.  At present, the risk of proceeding with an elective transplant surgery has become too high.                                                                               Although the selection committee believes you are not a suitable transplant candidate, you have the option to be evaluated at other transplant centers who may have different selection criteria.  You may request your [facility] records be sent to any center of your choice by contacting our Medical Records Department at [phone].                                                                                Attached is a letter from the [employer] Organ [employer]aring (UNOS).  It describes the services and information offered to patients by UN[employer] and the Organ Procurement and Transplant Network.    The [facility] sincerely wishes you the best and remains available to answer any questions.  Please do not hesitate to contact our pre-transplant office if we can assist you in any other way.                                                                               Sincerely,      Kamila Dial MD  Medical Director, Kidney & Kidney/Pancreas Transplantation    Cc: Manuel Wells MD    Encl: UNOS Letter               The Organ Procurement and Transplantation Network   Toll-free patient services line: [phone]  Your resource for organ transplant information      Staffed 8:30 am - 5:00 pm ET Monday - Friday   Leave a message 24/7 to receive a call back    The Organ Procurement and Transplantation Network (OPTN) is the national transplant system. It makes the policies that decide how donated organs are matched to patients waiting for a transplant. The OPTN:    Makes sure donated organs get matched to people on the transplant waiting list  Tells people about the donation and transplant processes  Makes sure that the public knows about the need for more organ and tissue donations    The OPTN has a free patient services line that you can call to:  Get more information about:   o Organ donation and organ transplants   o Donation and transplant policies  Get an information kit with:   o A list of transplant hospitals   o Waiting list information  Talk about any questions you may have about your transplant hospital or organ procurement organization. The staff will do their best to help you or point you to others who may help.  Find out how you can volunteer with the OPTN and help shape transplant policy    The patient services line  number is: 7-596-389-9496    Patient services line staff CANNOT answer questions about your own medical care, including:  Waiting list status  Test results  Medical records  You will need to call your transplant hospital for this information.    The following websites have more information about transplantation and donation:  OPTN: https://optn.transplant.hrsa.gov/  For potential living donors and transplant recipients:   o Living with transplant: https://www.transplantliving.org/   o Living donation process: https://optn.transplant.hrsa.gov/living-donation/     o Financial assistance: https://www.livingdonorassistance.org/  Transplantation data: https://www.srtr.org/  Organ donation: https://www.organdonor.gov/    Volunteer with the OPTN: https://optn.transplant.hrsa.gov/get-involved

## 2024-09-13 NOTE — COMMITTEE REVIEW
Native Organ Dx: Other, Specify - Retransplant         Unable to determine transplant candidacy at this time due to need to request medical records from [facility] and [facility] including  assessments and kidney biopsy reports for committee review.  Will also need to get  CT abdomen /pelvis without contrast to assess surgical approach given that patient is being evaluated for a third kidney transplant     Note written by Maylin Hess RN    ===============================================  I was present at the meeting and attest to the decision of the committee.    Yisel Odell, DO  Transplant Nephrology

## 2024-09-26 ENCOUNTER — TELEPHONE (OUTPATIENT)
Dept: TRANSPLANT | Facility: CLINIC | Age: 24
End: 2024-09-26
Payer: MEDICARE

## 2024-09-26 NOTE — TELEPHONE ENCOUNTER
Added to checklist.  ----- Message from Rula Payton MD sent at 9/16/2024  1:44 PM CDT -----      Hi, I called [facility] nephrologist and got this information. Please add this to her check list:      Transplant # 1 at [facility]: Transplant date:  12/28/2015    Donor type:  cadaveric            16 y.o. girl s/p DD renal Tx on 12/28/2015. 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 Has CKD stage 5.  Acute cellular/Humoral rejection 12/2016: treated with Thymoglobulin+High dose steroids+Plasmapheresis+Rituximab. She responded well to anti-rejection tx and her last serum Cr was 0.8 mg/dl on 12/27/16. She has no new complaints.       Acute cellular rejection 02/2017: Treated with high-dose steroids+Rituximab.       Acute AMR/Chronic AMR - 05/17 treated with pulse methylprednisolone, plasmapheresis, Thymoglobulin, Ritux.

## 2024-09-30 ENCOUNTER — TELEPHONE (OUTPATIENT)
Dept: TRANSPLANT | Facility: CLINIC | Age: 24
End: 2024-09-30
Payer: MEDICARE

## 2024-09-30 NOTE — TELEPHONE ENCOUNTER
Spoke to pt in regards to CT that is needed. Pt is having done locally at [facility] on 10/8. Pt advised we need the images on a CD. Will request once testing is completed.

## 2024-11-08 ENCOUNTER — TELEPHONE (OUTPATIENT)
Dept: TRANSPLANT | Facility: CLINIC | Age: 24
End: 2024-11-08
Payer: MEDICARE

## 2024-11-08 NOTE — TELEPHONE ENCOUNTER
----- Message from Margy Domínguez MD sent at 11/7/2024  9:25 PM CST -----  Previous kidney transplants appear to be on external iliacs bilaterally. Commons look ok.  ----- Message -----  From: Maylin Hess RN  Sent: 11/5/2024  10:03 AM CST  To: Margy Domínguez MD; #    Need Surgeon review for committee 11/08/24- thank you

## 2024-11-14 ENCOUNTER — TELEPHONE (OUTPATIENT)
Dept: TRANSPLANT | Facility: CLINIC | Age: 24
End: 2024-11-14
Payer: MEDICARE

## 2024-11-15 ENCOUNTER — TELEPHONE (OUTPATIENT)
Dept: TRANSPLANT | Facility: CLINIC | Age: 24
End: 2024-11-15
Payer: MEDICARE

## 2024-11-15 ENCOUNTER — COMMITTEE REVIEW (OUTPATIENT)
Dept: TRANSPLANT | Facility: CLINIC | Age: 24
End: 2024-11-15
Payer: MEDICARE

## 2024-11-15 NOTE — COMMITTEE REVIEW
Native Organ Dx: Other, Specify - Retransplant    Anna Gabriel was re-presented to the Transplant Selection Committee on 11/15/24.    Unable to determine transplant candidacy at this time due to the need for clarification and confirmation of caregiving plan (must bring backup caregiver to clinic).   Bring pt back to clinic for re-assessment by Transplant Nephrology and Surgeon.  If pt is still making urine will need referral to Urology.      Note written by Maylin Hess RN    ===============================================    I was present at the meeting and attest to the general consensus of the committee.   Colton Dos Santos Jr.

## 2025-01-13 ENCOUNTER — TELEPHONE (OUTPATIENT)
Dept: TRANSPLANT | Facility: CLINIC | Age: 25
End: 2025-01-13
Payer: MEDICARE

## 2025-01-13 NOTE — TELEPHONE ENCOUNTER
Returned call, explained that she was not denied, more information was needed to determine her candidacy for kidney transplant.  We have the requested reports and imaging for committee review, her case will be re-presented  after she returns to clinic for reassessment by Nephrology and Surgeon and confirmation of caregiving plan.    Pt reports change in dialysis clinic,    [facility] in [place].  ----- Message from [employer] sent at 1/13/2025  1:36 PM CST -----  Regarding: Consult/Advisory  Contact: [phone]  Consult/Advisory     Name Of Caller: pt         Contact Preference:   [phone]     Nature of call: Pt requesting reason for denial, please call to advise thank you

## 2025-01-14 ENCOUNTER — TELEPHONE (OUTPATIENT)
Dept: TRANSPLANT | Facility: CLINIC | Age: 25
End: 2025-01-14
Payer: MEDICARE

## 2025-01-16 ENCOUNTER — TELEPHONE (OUTPATIENT)
Dept: TRANSPLANT | Facility: CLINIC | Age: 25
End: 2025-01-16
Payer: MEDICARE

## 2025-01-16 NOTE — TELEPHONE ENCOUNTER
----- Message from Med Assistant Diaz sent at 1/16/2025  8:26 AM CST -----  Eyal Carlisle, if we can get patients dialysis unit updated please, thanks so much. I have attached my note.   Emily

## 2025-01-16 NOTE — TELEPHONE ENCOUNTER
MA notes per Adherence form     PD PT    FOR 1 Month due to change of clinics please see message below :    0-AMA's  0-No-shows    No concerns with care giving, transportation, or mental health    Pt is now with   [facility] (clinic [id number])  [address] [place] [address] [place] [address]   [phone] fax [phone]    Admit date 12/2/24  PD PT    Per adherence form : Records from previous clinic for months 10/24 and 11/24 were requested but have not received. Will be sent over asa.     Scanned in pt's media    Emily Cruz  Abdominal Transplant MA

## 2025-02-07 ENCOUNTER — TELEPHONE (OUTPATIENT)
Dept: TRANSPLANT | Facility: CLINIC | Age: 25
End: 2025-02-07
Payer: MEDICARE

## 2025-02-07 NOTE — TELEPHONE ENCOUNTER
Spoke to pt in regards to clinic visit. Confirmed date, time and location. Reminder mailed. Pt voiced understanding.

## 2025-03-25 ENCOUNTER — OFFICE VISIT (OUTPATIENT)
Dept: TRANSPLANT | Facility: CLINIC | Age: 25
End: 2025-03-25
Payer: MEDICARE

## 2025-03-25 VITALS
DIASTOLIC BLOOD PRESSURE: 114 MMHG | HEIGHT: 62 IN | RESPIRATION RATE: 16 BRPM | WEIGHT: 144.63 LBS | SYSTOLIC BLOOD PRESSURE: 160 MMHG | BODY MASS INDEX: 26.61 KG/M2 | OXYGEN SATURATION: 100 % | TEMPERATURE: 97 F | HEART RATE: 75 BPM

## 2025-03-25 DIAGNOSIS — N18.6 ANEMIA IN CHRONIC KIDNEY DISEASE, ON CHRONIC DIALYSIS: ICD-10-CM

## 2025-03-25 DIAGNOSIS — Z99.2 HYPERPARATHYROIDISM DUE TO ESRD ON DIALYSIS: ICD-10-CM

## 2025-03-25 DIAGNOSIS — T86.12 FAILED KIDNEY TRANSPLANT: ICD-10-CM

## 2025-03-25 DIAGNOSIS — Z01.818 PRE-TRANSPLANT EVALUATION FOR CHRONIC KIDNEY DISEASE: Primary | ICD-10-CM

## 2025-03-25 DIAGNOSIS — N18.6 HYPERPARATHYROIDISM DUE TO ESRD ON DIALYSIS: ICD-10-CM

## 2025-03-25 DIAGNOSIS — N18.6 BENIGN HYPERTENSION WITH ESRD (END-STAGE RENAL DISEASE): ICD-10-CM

## 2025-03-25 DIAGNOSIS — I12.0 BENIGN HYPERTENSION WITH ESRD (END-STAGE RENAL DISEASE): ICD-10-CM

## 2025-03-25 DIAGNOSIS — Z99.2 ANEMIA IN CHRONIC KIDNEY DISEASE, ON CHRONIC DIALYSIS: ICD-10-CM

## 2025-03-25 DIAGNOSIS — Z99.2 ESRD ON PERITONEAL DIALYSIS: ICD-10-CM

## 2025-03-25 DIAGNOSIS — N18.6 ESRD ON PERITONEAL DIALYSIS: ICD-10-CM

## 2025-03-25 DIAGNOSIS — N25.81 HYPERPARATHYROIDISM DUE TO ESRD ON DIALYSIS: ICD-10-CM

## 2025-03-25 DIAGNOSIS — N05.1 FSGS (FOCAL SEGMENTAL GLOMERULOSCLEROSIS): ICD-10-CM

## 2025-03-25 DIAGNOSIS — E88.09 HYPOALBUMINEMIA: ICD-10-CM

## 2025-03-25 DIAGNOSIS — D63.1 ANEMIA IN CHRONIC KIDNEY DISEASE, ON CHRONIC DIALYSIS: ICD-10-CM

## 2025-03-25 PROCEDURE — 99204 OFFICE O/P NEW MOD 45 MIN: CPT | Mod: S$PBB,TXP,, | Performed by: SURGERY

## 2025-03-25 PROCEDURE — 99215 OFFICE O/P EST HI 40 MIN: CPT | Mod: S$PBB,TXP,, | Performed by: NURSE PRACTITIONER

## 2025-03-25 PROCEDURE — 99999 PR PBB SHADOW E&M-EST. PATIENT-LVL IV: CPT | Mod: PBBFAC,TXP,, | Performed by: NURSE PRACTITIONER

## 2025-03-25 PROCEDURE — 99214 OFFICE O/P EST MOD 30 MIN: CPT | Mod: PBBFAC,TXP | Performed by: NURSE PRACTITIONER

## 2025-03-25 RX ORDER — ERGOCALCIFEROL 1.25 MG/1
50000 CAPSULE ORAL
COMMUNITY

## 2025-03-25 NOTE — LETTER
March 27, 2025        Bryan Wells  [address]  [facility] [place] [address]  Phone: [phone]  Fax: [phone]             [facility]- Transplant 1st Fl  [address]  [facility] [address]  Phone: [phone]   Patient: Anna Gabriel   MR Number: [id number]   YOB: 2000   Date of Visit: 3/25/2025       Dear Dr. Bryan Wells    Thank you for referring Anna Gabriel to me for evaluation. Attached you will find relevant portions of my assessment and plan of care.    If you have questions, please do not hesitate to call me. I look forward to following Anna Gabriel along with you.    Sincerely,    Citlali Hatfield, NP    Enclosure    If you would like to receive this communication electronically, please contact [email or web address] or [phone] to request [employer] Link access.    [employer] Link is a tool which provides read-only access to select patient information with whom you have a relationship. Its easy to use and provides real time access to review your patients record including encounter summaries, notes, results, and demographic information.    If you feel you have received this communication in error or would no longer like to receive these types of communications, please e-mail [email or web address]

## 2025-03-25 NOTE — PROGRESS NOTES
Transplant Recipient Adult Psychosocial Assessment  2015 [facility],  donor kidney transplant,   2019 [facility],  donor kidney transplant  Pt is on PD at this time.    Anna Gabriel  [address]  [place] [address]  Telephone Information:   Mobile [phone]   Home  [phone] (home)  Work  There is no work phone number on file.  E-mail  [email or web address]    Sex: female  YOB: 2000  Age: 24 y.o.    Encounter Date: 3/25/2025  [place] Citizen: yes  Primary Language: English   Needed: no  Transportation: pt reports drives self/own car    Emergency Contact:  Francesca Hatfield, 41 yo mother, [place], does drive/own car, works full time as . [phone]    Family/Social Support:   Number of dependents/: pt denies  Marital history: pt reports single, never   Other family dynamics: Pt reports both parents are living with mother Francesca Hatfield ([place]) assisting with transplant. Pt reports 2 brothers with 1 brother Edouard Blanco ([place]) will be assisting with transplant. Pt reports supportive uncle Beni Estevez ([place]) will be assisting with transplant.    Household Composition:  Pt resides with mother     Do you and your caregivers have access to reliable transportation? yes  PRIMARY CAREGIVER: Francesca Hatfield, mother, will be primary caregiver, phone number [phone]      provided in-depth information to patient and caregiver regarding pre- and post-transplant caregiver role.   strongly encourages patient and caregiver to have concrete plan regarding post-transplant care giving, including back-up caregiver(s) to ensure care giving needs are met as needed.    Patient and Caregiver states understanding all aspects of caregiver role/commitment and is able/willing/committed to being caregiver to the fullest extent necessary.    Patient and Caregiver verbalizes understanding of the education  provided today and caregiver responsibilities.         remains available. Patient and Caregiver agree to contact  in a timely manner if concerns arise.      Able to take time off work without financial concerns: yes.     Additional Significant Others who will Assist with Transplant:  Edouard Blanco, 26 yo brother, [place], does drive/own car, works full time as recreational therapist . [phone]  Beni Estevez 44 yo uncle, [place], does drive/own car, works full time as network supervisor for [employer]. [phone]    Living Will: no  Healthcare Power of : no Pt reports trusting mother, brother Edouadr and uncle Beni with medical decisions as needed   Advance Directives on file: <<no information> per medical record.  Verbally reviewed LW/HCPA information.   provided patient with copy of LW/HCPA documents and provided education on completion of forms.    Living Donors: Education and resource information given to patient.    Highest Education Level: Associate/Bachelor Degree   Reading Ability: college  Reports difficulty with: N/A  Learns Best By:  multisensory     Status: no  VA Benefits: no     Working for Income: No  If no, reason not working: Disability  Patient reports work history at [employer] ages 16 - 19 yo. 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 Pt reports ESRD and on PD at this time.    Monthly Income:  $950 disability check  Able to afford all costs now and if transplanted, including medications: yes  Patient and Caregiver verbalizes understanding of personal responsibilities related to transplant costs and the importance of having a financial plan to ensure that patients transplant costs are fully covered.       provided fundraising information/education. Patient and Caregiver  verbalizes understanding.   remains available.    Insurance:   Payer/Plan Subscr  Sex Relation Sub. Ins. ID Effective Group Num   1. MEDICARE - [place] MAR 10/15/00 Female Self [id number]                                    [address]   2. [place] MAR 10/15/00 Female Self [id number]                                    [address] [address]       Primary Insurance (for UNOS reporting): Public Insurance - Medicare FFS (Fee For Service) and Medicaid  Secondary Insurance (for UNOS reporting): Private Insurance BCBS through mother's job.     Patient and Caregiver verbalizes clear understanding that patient may experience difficulty obtaining and/or be denied insurance coverage post-surgery. This includes and is not limited to disability insurance, life insurance, health insurance, burial insurance, long term care insurance, and other insurances.      Patient and Caregiver also reports understanding that future health concerns related to or unrelated to transplantation may not be covered by patient's insurance.  Resources and information provided and reviewed.     Patient and Caregiver provides verbal permission to release any necessary information to outside resources for patient care and discharge planning.  Resources and information provided are reviewed.      Dialysis Adherence: Patient and Caregiver reports having high dialysis compliance with treatments and instructions within last 3 months.   MA notes per Adherence form   PD PT   FOR 1 Month due to change of clinics please see message below :  0-AMA's  0-No-shows  No concerns with care giving, transportation, or mental health  Pt is now with   [facility] (clinic 8795)  [address]  [place] [address]   [phone] fax [phone]  Per adherence form : Records from previous clinic for months 10/24 and  were requested but have not received. Will be sent over relocality.    Emily  Anthony  Abdominal Transplant MA     Infusion Service: patient utilizing? Pt denies  Home Health: patient utilizing? Pt denies  DME: PD equipment and supplies  Pulmonary/Cardiac Rehab: pt denies  ADLS:  independent with self care and medication management (uses alarms for medication reminders) and does drive self/own car.    Adherence:   Previous history of medical non compliance with transplant medicines causing transplanted kidney rejection. Pt is on dialysis at this time. Pt reports her belief kidney rejection was from bladder infection that developed into a kidney infection which caused transplanted kidney failure/rejection. 3/20/24 dialysis compliance update shows suitable dialysis compliance within last 3 months.     Adherence education and counseling provided. Recommend dialysis compliance update before transplant listing and surgery to show patient is maintaining medical compliance.     Per History Section:  Past Medical History:   Diagnosis Date    Anemia     Encounter for blood transfusion     FSGS (focal segmental glomerulosclerosis)     Hypertension     Hyperthyroidism     Kidney transplant status     Kidney Rejection transplant kidney     Social History     Tobacco Use    Smoking status: Never    Smokeless tobacco: Not on file   Substance Use Topics    Alcohol use: No     Alcohol/week: 0.0 standard drinks of alcohol     Social History     Substance and Sexual Activity   Drug Use Never     Social History     Substance and Sexual Activity   Sexual Activity Never       Per Today's Psychosocial:  Tobacco: none, patient denies any use.  Alcohol: none, patient denies any use.  Illicit Drugs/Non-prescribed Medications: none, patient denies any use.    Patient and Caregiver states clear understanding of the potential impact of substance use as it relates to transplant candidacy and is aware of possible random substance screening.  Substance abstinence/cessation counseling, education and resources provided and  reviewed.     Arrests/DWI/Treatment/Rehab: patient denies    Psychiatric History:    Mental Health: Pt denies any mental health history or current mental health struggles. Pt denies any need for mental health referral at this time.  Psychiatrist/Counselor: pt denies  Medications:  pt denies  Suicide/Homicide Issues: pt denies any si/hi history at this time.  Safety at home: Pt reports living in safe home environment with no abuse at this time.    Knowledge: Patient and Caregiver states having clear understanding and realistic expectations regarding the potential risks and potential benefits of organ transplantation and organ donation and agrees to discuss with health care team members and support system members, as well as to utilize available resources and express questions and/or concerns in order to further facilitate the pt informed decision-making.  Resources and information provided and reviewed.    Patient and Caregiver is aware of [facility] affiliation and/or partnership with agencies in home health care, LTAC, SNF, [facility], and other hospitals and clinics.    Understanding: Patient and Caregiver reports having a clear understanding of the many lifetime commitments involved with being a transplant recipient, including costs, compliance, medications, lab work, procedures, appointments, concrete and financial planning, preparedness, timely and appropriate communication of concerns, abstinence (ETOH, tobacco, illicit non-prescribed drugs), adherence to all health care team recommendations, support system and caregiver involvement, appropriate and timely resource utilization and follow-through, mental health counseling as needed/recommended, and patient and caregiver responsibilities.  Social Service Handbook, resources and detailed educational information provided and reviewed.  Educational information provided.    Patient and Caregiver also reports current and expected compliance with health care regime and states  having a clear understanding of the importance of compliance.      Patient and Caregiver reports a clear understanding that risks and benefits may be involved with organ transplantation and with organ donation.       Patient and Caregiver also reports clear understanding that psychosocial risk factors may affect patient, and include but are not limited to feelings of depression, generalized anxiety, anxiety regarding dependence on others, post traumatic stress disorder, feelings of guilt and other emotional and/or mental concerns, and/or exacerbation of existing mental health concerns.  Detailed resources provided and discussed.      Patient and Caregiver agrees to access appropriate resources in a timely manner as needed and/or as recommended, and to communicate concerns appropriately.  Patient and Caregiver also reports a clear understanding of treatment options available.     Patient and Caregiver received education in a group setting.   reviewed education, provided additional information, and answered questions.    Feelings or Concerns: Pt reports high motivation to pursue kidney organ transplant at this time.    Coping: Identify Patient & Caregiver Strategies to [place]:   1. In the past, coping with major surgery and/or related stress - family support; enjoys making art work, especially ceramics.    2. Currently & Pre-transplant -  family support; enjoys making art work, especially ceramics.    3. At the time of surgery - family support   4. During post-Transplant & Recovery Period - family support    Goals: Pt reports hope for successful kidney organ transplant so she can discontinue dialysis and have healthier life. Patient referred to Vocational Rehabilitation.    Interview Behavior: Patient and Caregiver presents as alert and oriented x 4, pleasant, good eye contact, well groomed, recall good, concentration/judgement good, average intelligence, calm, communicative, cooperative, and asking and  answering questions appropriately. Pt's highly supportive uncle Beni in session with patient's permission.         Transplant Social Work - Candidacy  Assessment/Plan:     Psychosocial Suitability: Patient presents as medium to high risk candidate for kidney transplant at this time due to patient's significant documented medical history of medical non compliance which resulted in kidney organ transplant failure. Pt's 1/25 dialysis compliance update shows suitable dialysis compliance within last 3 months.     Adherence education and counseling provided. Recommend dialysis compliance update before transplant listing and surgery to show patient is maintaining medical compliance. Pt reports having organ transplant caregiver/transportation plan, medical insurance plan and plan to afford transplant costs all in place.    Recommendations/Additional Comments: SW recommends that pt conduct fundraising to assist pt with pay for cost of medications, food, gas, and other transplant related needs.  SW recommends that pt remain aware of potential mental health concerns and contact the team if any concerns arise.  SW recommends that pt remain abstinent from tobacco, ETOH, and drug use.  SW supports pt's continued adherence. SW remains available to answer any questions or concerns that arise as the pt moves through the transplant process.        Graciela Siegel, MSW, LMSW

## 2025-03-25 NOTE — PROGRESS NOTES
Transplant Nephrology  Kidney Transplant Recipient Evaluation    Referring Physician: Bryan Wells  Current Nephrologist: Bryan Wells    Subjective:   CC:  Initial evaluation of kidney transplant candidacy.    HPI:  Ms. Gabriel is a 24 y.o. year old White female who has presented to be evaluated as a potential kidney transplant recipient.  She has ESRD secondary to FSGS and failed kidney txp x2.    Txp #1 ~  at [facility]  and txp #2 2020 at [place], [place]     She has been back  on currently  PD since  ~ 3/2021   She denies peritonitis    ESRD HX:  .Patient developed nephrotic syndrome ~ age 12 and  was admitted to [facility] .  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 Approximately ,  she received a  donor transplant and did well for first year.  Her  Mom had to move from [facility] [place] to near [place] and patient was staying with brothers over 18 years of age. She started missing doses of medications and within a year lost her kidney. She returned to PD 3 months ago. Her BP was still uncontrolled and had a PRES/ seizure requiring PICU admission last month.     TXP Complications:    s/p DD renal Tx on 2015. Cause of ESRD: C3GN and FSGS. 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 Has CKD stage 5. Acute cellular/Humoral rejection 2016: treated with Thymoglobulin+High dose steroids+Plasmapheresis+Rituximab. She responded well to anti-rejection tx and her last serum Cr was 0.8 mg/dl on 16. She has no new complaints. Acute cellular rejection 2017: Treated with high-dose steroids+Rituximab. Acute AMR/Chronic AMR -  treated with pulse methylprednisolone, plasmapheresis, Thymoglobulin, Ritux.      HX: Seizure and HX PRES  in the setting of hypertensive emergency.   No longer on meds     HX decreased  bladder capacity  -Pt underwent Urodynamic studies showing a decreased bladder capacity of 850 cc.   Detrusor pressures were around 50 cmH2O at her capacity.   Patient was unable to urinate once her bladder had capacity. Her catheters had to be removed in order for her to urinate. Even so, she still had to be intermittently Anterior bladder.  Good compliance bladder noted  but her capacity was low, which can be seen with anuria. At this point, would recommend that she go ahead and get her transplant. Would recommend that she start on oxybutynin immediately following her her surgery (on postop day 1). Would like to repeat the urodynamics once she is able to have a larger bladder capacity to assess for a hostile bladder at larger volumes   Electronically signed by Schexnayder, Kaitlen Sicard, MD at 09/02/2022      LOV 3/28/24 in clinic      11/15/24.per committee:  Unable to determine transplant candidacy at this time due to the need for clarification and confirmation of caregiving plan (must bring backup caregiver to clinic).   Bring pt back to clinic for re-assessment by Transplant Nephrology and Surgeon.  If pt is still making urine will need referral to Urology.    Fx assessment: Unchanged, She does not appear frail.    Pt reports she is Anuric       H/H remains low /unchanged since last seen in clinic-Pt reports getting Micera monthly and completed  Fe series   Lab Results   Component Value Date    WBC 6.28 03/28/2024    HGB 7.8 (L) 02/17/2025    HCT 23.4 (L) 03/28/2024    MCV 96 03/28/2024     03/28/2024         Past Medical and Surgical History: Ms. Gabriel  has a past medical history of Anemia, Encounter for blood transfusion, FSGS (focal segmental glomerulosclerosis), Hypertension, Hyperthyroidism, and Kidney transplant status.  She has a past surgical history that includes Kidney transplant; Nephrectomy; Bone marrow biopsy; and Peritoneal catheter removal.    Past Social and Family History: Ms. Gabriel  "reports that she has never smoked. She does not have any smokeless tobacco history on file. She reports that she does not drink alcohol and does not use drugs. Her family history includes Arrhythmia in her father; Asthma in her maternal grandmother; Diabetes in her father and maternal grandmother; Heart disease in her maternal grandmother.    Past Medical History:   Diagnosis Date    Anemia     Encounter for blood transfusion     FSGS (focal segmental glomerulosclerosis)     Hypertension     Hyperthyroidism     Kidney transplant status     Kidney Rejection transplant kidney       Review of Systems   Constitutional:  Negative for activity change, appetite change, chills, fatigue, fever and unexpected weight change.   HENT:  Negative for congestion, facial swelling, postnasal drip, rhinorrhea, sinus pressure, sore throat and trouble swallowing.    Eyes:  Negative for pain, redness and visual disturbance.   Respiratory:  Negative for cough, chest tightness, shortness of breath and wheezing.    Cardiovascular: Negative.  Negative for chest pain, palpitations and leg swelling.   Gastrointestinal:  Negative for abdominal pain, diarrhea, nausea and vomiting.   Genitourinary:  Positive for decreased urine volume (anuric). Negative for dysuria, flank pain and urgency.   Musculoskeletal:  Negative for gait problem, neck pain and neck stiffness.   Skin:  Negative for rash.   Allergic/Immunologic: Positive for immunocompromised state. Negative for environmental allergies and food allergies.   Neurological:  Negative for dizziness, weakness, light-headedness and headaches.   Psychiatric/Behavioral:  Negative for agitation and confusion. The patient is not nervous/anxious.        Objective:   Blood pressure (!) 160/114, pulse 75, temperature 97.3 °F (36.3 °C), temperature source Temporal, resp. rate 16, height 5' 1.93" (1.573 m), weight 65.6 kg (144 lb 10 oz), SpO2 100%.body mass index is 26.51 kg/m².    Physical " "Exam  Constitutional:       Appearance: Normal appearance. She is well-developed.   HENT:      Head: Normocephalic.      Nose: Nose normal.   Eyes:      Conjunctiva/sclera: Conjunctivae normal.      Pupils: Pupils are equal, round, and reactive to light.   Cardiovascular:      Rate and Rhythm: Normal rate and regular rhythm.      Heart sounds: Normal heart sounds.   Pulmonary:      Effort: Pulmonary effort is normal.      Breath sounds: Normal breath sounds.   Abdominal:      General: Bowel sounds are normal.      Palpations: Abdomen is soft. There is no hepatomegaly or splenomegaly.          Comments:     Musculoskeletal:      Cervical back: Normal range of motion and neck supple.   Skin:     General: Skin is warm and dry.   Neurological:      Mental Status: She is alert and oriented to person, place, and time.   Psychiatric:         Behavior: Behavior normal.         Labs:  Lab Results   Component Value Date    WBC 6.28 03/28/2024    HGB 7.8 (L) 02/17/2025    HCT 23.4 (L) 03/28/2024     03/28/2024    K 4.5 03/28/2024    CL 97 03/28/2024    CO2 27 03/28/2024    BUN 33 (H) 03/28/2024    CREATININE 13.0 (H) 03/28/2024    EGFRNORACEVR 3.7 (A) 03/28/2024    CALCIUM 8.5 (L) 03/28/2024    PHOS 4.8 (H) 03/28/2024    MG 1.9 09/09/2017    ALBUMIN 2.9 (L) 03/28/2024    AST 15 03/28/2024    ALT 12 03/28/2024     (H) 03/03/2025       No results found for: "PREALBUMIN", "BILIRUBINUA", "GGT", "AMYLASE", "LIPASE", "PROTEINUA", "NITRITE", "RBCUA", "WBCUA"    No results found for: "HLAABCTYPE"    Labs were reviewed with the patient.    Assessment:     1. Pre-transplant evaluation for chronic kidney disease    2. ESRD on peritoneal dialysis    3. Failed kidney transplant x2    4. Benign hypertension with ESRD (end-stage renal disease)    5. FSGS (focal segmental glomerulosclerosis)    6. Anemia in chronic kidney disease, on chronic dialysis    7. Hyperparathyroidism due to ESRD on dialysis    8. Hypoalbuminemia  "     Plan:   H/H remains low over the past year  2/17/25 CBC: Anemia: H/H: 7.8 / 23.4  -- needs to optimize   -add  hemoc referral --ok to do locally     -hypoalbuminemia--Fax labs to dialysis/nephrologist concerning  mgmt   -needs to maintain acceptable H/H for txp   ALB 2.9    -is due for annual diagnostic testing per guidelines ( CXR, renal US), TTE due in 6/2025    -will be seen by txp surgery today--will need to re discuss urology recs for post txp with committee at presentation      Needs SW clearance c/o caregiver plan     Transplant Candidacy:   Based on available information, Ms. Gabriel is a high-risk kidney transplant candidate d/t HX 2 failed kidney txp and long time exposure to IS. .   Meets center eligibility for accepting HCV+ donor offer - Yes.  Patient educated on HCV+ donors. Anna is willing to accept HCV+ donor offer - Yes   Patient is a candidate for KDPI > 85 kidney donor offer - No.  Final determination of transplant candidacy will be made once workup is complete and reviewed by the selection committee.    Patient advised that it is recommended that all transplant candidates, and their close contacts and household members receive Covid vaccination.    UNOS Patient Status  Functional Status: 60% - Requires occasional assistance but is able to care for needs     Citlali Hatfield NP

## 2025-03-26 NOTE — PROGRESS NOTES
Transplant Surgery  Kidney Re-Transplant Recipient Evaluation    Referring Physician: Bryan Wells  Current Nephrologist: Bryan Wells    Subjective:     Reason for Visit: evaluate transplant candidacy    History of Present Illness: Anna Gabriel is a 24 y.o. year old female undergoing transplant evaluation.    Dialysis History: Anna is on peritoneal dialysis.      Transplant History: N/A    Etiology of Renal Disease: Other, Specify - Retransplant (based on medical records from referral).    External provider notes reviewed: No    Review of Systems   Constitutional:  Positive for fatigue.   HENT:  Negative for drooling, postnasal drip and sore throat.    Eyes:  Negative for discharge and itching.   Respiratory:  Negative for choking and stridor.    Gastrointestinal:  Negative for rectal pain.   Endocrine: Negative for polydipsia.   Genitourinary:  Negative for enuresis and genital sores.   Musculoskeletal:  Negative for back pain, neck pain and neck stiffness.   Allergic/Immunologic: Negative for immunocompromised state.   Neurological:  Negative for facial asymmetry and numbness.   Hematological:  Negative for adenopathy.   Psychiatric/Behavioral:  Negative for behavioral problems, self-injury and suicidal ideas.    Objective:     Physical Exam:  Constitutional:   Vitals reviewed: yes   Well-nourished and well-groomed: yes  Eyes:   Sclerae icteric: no   Extraocular movements intact: yes  GI:    Bowel sounds normal: yes   Tenderness: no    If yes, quadrant/location: not applicable   Palpable masses: no    If yes, quadrant/location: not applicable   Hepatosplenomegaly: no   Ascites: no   Hernia: no    If yes, type/location: not applicable   Surgical scars: yes    If yes, type/location: bilateral kidney transplant  Resp:   Effort normal: yes   Breath sounds normal: yes    CV:   Regular rate and rhythm: yes   Heart sounds normal: yes   Femoral pulses normal: yes   Extremities edematous: no  Skin:   Rashes or  lesions present: no    If yes, describe:not applicable   Jaundice:: no    Musculoskeletal:   Gait normal: yes   Strength normal: yes  Psych:   Oriented to person, place, and time: yes   Affect and mood normal: yes    Additional comments: not applicable    Diagnostics:  The following labs have been reviewed: CBC  CMP  The following radiology images have been independently reviewed and interpreted: CT Abd/Pelvis    Counseling: We provided Anna Gabriel with a group education session today.  We discussed kidney transplantation at length with her, including risks, potential complications, and alternatives in the management of her renal failure.  The discussion included complications related to anesthesia, bleeding, infection, primary nonfunction, and ATN.  I discussed the typical postoperative course, length of hospitalization, the need for long-term immunosuppression, and the need for long-term routine follow-up.  I discussed living-donor and -donor transplantation and the relative advantages and disadvantages of each.  I also discussed average waiting times for both living donation and  donation.  I discussed national and center-specific survival rates.  I also mentioned the potential benefit of multicenter listing to candidates listed with centers within more than one organ procurement organization.  All questions were answered.    Patient advised that it is recommended that all transplant candidates, and their close contacts and household members receive Covid vaccination.    Final determination of transplant candidacy will be made once evaluation is complete and reviewed by the Kidney & Kidney/Pancreas Selection Committee.    Coronavirus disease (COVID-19) caused by severe acute respiratory virus coronavirus 2 (SARS-C0V 2) is associated with increased mortality in solid organ transplant recipients (SOT) compared to non-transplant patients. Vaccine responses to vaccination are depressed against  SARS-CoV2 compared to normal individuals but improve with third vaccination doses. Vaccination prior to SOT provides both the best opportunity for transplant candidates to develop protective immunity and to reduce the risk of serious COVID19 infections post transplantation. Organ transplant candidates at [facility] will be required to receive SARS-CoV-2 vaccination prior to being listed with a an active status, whenever possible. Exceptions will be made for disability related reasons or for sincerely held Yarsani beliefs.          Transplant Surgery - Candidacy   Assessment/Plan:   Anna Gabriel has end stage renal disease (ESRD) on dialysis. I have concerns with 2 previous transplants that only lasted 1 year each. We had a long discussion about possible operative plan - she may require possible previous transplant allograft nephrectomy and/or midline incision and placement of new allograft on likely the right side. Based on available information, Anna Gabriel is a high-risk kidney transplant candidate.     Additional testing to be completed according to the Written Order Guidelines for Adult Pre-kidney and Pancreas Transplant Evaluation (KI-02).  Interpretation of tests and discussion of patient management involves all members of the multidisciplinary transplant team.    Gurwinder Hutson MD

## 2025-04-01 ENCOUNTER — TELEPHONE (OUTPATIENT)
Dept: TRANSPLANT | Facility: CLINIC | Age: 25
End: 2025-04-01
Payer: MEDICARE

## 2025-04-01 NOTE — TELEPHONE ENCOUNTER
MA notes per Adherence form     PD PT    FOR THE PAST THREE MONTHS:    0-AMA's  0-No-shows    No concerns with care giving, transportation, or mental health    Scanned in pt's media    Emily Cruz  Abdominal Transplant MA

## 2025-04-04 ENCOUNTER — TELEPHONE (OUTPATIENT)
Dept: TRANSPLANT | Facility: CLINIC | Age: 25
End: 2025-04-04
Payer: MEDICARE

## 2025-04-04 ENCOUNTER — COMMITTEE REVIEW (OUTPATIENT)
Dept: TRANSPLANT | Facility: CLINIC | Age: 25
End: 2025-04-04
Payer: MEDICARE

## 2025-04-04 NOTE — LETTER
April 4, 2025    Bryan Wells MD  [address] [facility] [place] [address]  Phone: [phone]  Fax: [phone]     Dear Dr. Wells:    Patient: Anna Gabriel   MR Number: [id number]   YOB: 2000     Your patient, Anna Gabriel, was recently discussed at the [facility] Selection Committee meeting on 4/4/2025. I am happy to inform you that Anna has been approved for transplantation.  She has met selection criteria for a kidney transplant related to ESRD secondary to primary diagnosis of Other, Specify - Retransplant. Your patient will be placed on the cadaveric wait list pending final financial approval from insurance company.     We appreciate your confidence in allowing us to participate in your patients care.  If you have any questions or concerns, please do not hesitate to contact me.    Sincerely,      Kamila Dial MD  Medical Director, Kidney & Kidney/Pancreas Transplantation    CC:  Anna Gabriel (patient)           [facility]

## 2025-04-04 NOTE — COMMITTEE REVIEW
Native Organ Dx: Other, Specify - Retransplant      SELECTION COMMITTEE NOTE    Anna Gabriel was presented at selection committee on 4/4/2025.  Patient met selection criteria for kidney transplant related to ESRD due to   Other, Specify - Retransplant.  No absolute contraindications to transplant at this time.  Patient will be placed on the cadaveric wait list pending final financial approval from insurance company.  Patient will return to clinic for routine appointment in 1 year(s). Patient meets criteria for High KDPI kidney offer due to high PRA (99%). Patient meets HCV+ kidney offer. Patient does not meet criteria for dual/enbloc, due to committee's decision.  Planned immunosuppression Thymo.    At the end of the committee meeting Alee Gómez, transplant , confirmed back-up caregiver and lodging plan. Patient reported her brother and uncle will be her back-up caregivers. Edouard Blanco, 26 brother, [phone] and Beni Estevez, 44, uncle, [phone]. Note documented in Epic.  Per committee, at the time of a kidney offer patient must confirm her back-up caregivers are available if needed. Documented in  On-Call note.  Patient will need Hem/Onc consult for history of anemia (recent H/H 7.8/23.4) but ok to list now.  Patient was informed of the decision after the committee meeting by the phone and agreed to the plan.    Note written by   Isela Oliva RN  ===============================================    I was present at the meeting and attest to the general consensus of the committee.   Colton Dos Santos Jr.

## 2025-04-04 NOTE — TELEPHONE ENCOUNTER
SW spoke with patient regarding back-up caregiver and lodging plan. Patient reported her brother and uncle will be her back-up caregivers.   Edouard Blanco, 26 brother, [phone]  Beni Estevez, 44, uncle, [phone]    Patient informed she may need to stay closer to hospital after transplant due to her medical issues. Patient reported she lives 1 1/2 hr away. SW advised patient she may need to stay closer. Patient reported brother lives about 1 hr away in [place], [place]. SW advised patient to start looking for closer lodging after transplant just in case. Patient agreeable and verbalized understanding.     Alee Gómez LCSW, Kidney Transplant

## 2025-04-07 ENCOUNTER — TELEPHONE (OUTPATIENT)
Dept: TRANSPLANT | Facility: CLINIC | Age: 25
End: 2025-04-07
Payer: MEDICARE

## 2025-04-07 DIAGNOSIS — Z76.82 ORGAN TRANSPLANT CANDIDATE: Primary | ICD-10-CM

## 2025-04-07 NOTE — TELEPHONE ENCOUNTER
"KIDNEY WAIT LISTING NOTE    Date of Financial clearance to list: 2025    SSN/[facility]:     Organ: Kidney    Last Name: Harvey  First Name: Anna    : 2000       Gender: female        MRN#: [id number]                                   State of Permanent Residence:  [place] [address]    Ethnicity: Not  or /a   Race:      White    CLINICAL INFORMATION   Candidate Medical Urgency Status: Active (1)  Number of Previous Kidney Transplants: 2  Number of Previous Solid Organ Transplants: 2  Did you enter number of previous kidney or other solid organ transplants? Yes  Is this Candidate a Prior Living Donor: No  (If yes, please generate letter to [employer] with patient's date of donation, recipient SSN, signed by Surgical Director after patient is listed in order to receive priority points).      ABO  ABO Blood Group:   O NEG     ABO Confirmation: (THESE DATES MUST BE PRIOR TO THE LIST DATE AND SUPPORTED BY SEPARATE LAB REPORTS)    Internal Results    Lab Results   Component Value Date    GROUPTRH O NEG 2024    GROUPTRH O NEG 2017     No results found for: "ABO"    External Results    ABO Date 1:    ABO Date 2  Are either of these ABO results based on External Labs? No  (If Yes, STOP and go to source document in Media Tab for verification).    VITALS  Height:  5' 1.93" (1.573 m)   Weight:  65.6 kg (144 lb 10 oz   (Use height from Transplant clinic visits only).  Did you enter height/weight? Yes    HLA    Class I:  Lab Results   Component Value Date    QJFX0HB 2 2024    SQAU0GS 29 2024    XYPX0ZC 62 2024    VFZH8QB 44 2024    HUHYJ4ZF 6 2024    ENJYI1ZC 4 2024    SUROZ7RG 9 2024    COFJJ9DT 16 2024       Class II:  Lab Results   Component Value Date    IRZIIK99XN 1 2024    HHMPHH76RL 13 2024    NCCLDN226NK 52 2024    XYQYSD1584 XX 2024    RJUSD9RB 5 2024    XGAEJ6ZW 6 2024       Tested for " "HLA Antibodies: Yes, antibodies detected     If result is "Positive" antibodies are detected     If result is "Negative or questionable" no antibodies detected    No results found for: "CIPRAS", "CIIPRAS"    DIALYSIS INFORMATION  Is patient Pre-Dialysis: No     GFR Information  Report GFR being used as the criteria for placement on the kidney list. If not, leave blank  GFR < or = 20 ml/min? n/a  If Yes, Specify value  ___   ml/min     Initial date GFR became 20 or less:   Is GFR obtained from an Outside lab Result? n/a  (If YES verify with source document scanned into media)    If patient on Dialysis:    Is candidate currently on dialysis for ESRD? Yes  If Yes,  Date Chronic Dialysis Started:   1/10/2022  (verify with source document in Media Tab)   Dialysis Unit Name: [facility] [place] [address]                        Physician Name:  Dr. Kamila Jones  NPI#: [id number]    DIABETES INFORMATION  Primary Native Kidney Diagnosis: Other, Specify - Retransplant  C-Peptide Value - No results found for: "CPEPTIDE"  Current Diabetes Status: Does not have diabetes    FOR NON-KIDNEY DEPARTMENT USE ONLY:  Additional Organs Registered? none    Maximum Acceptable Number of HLA Mismatches  ABDR:     6      (0-6)               AB:               (0-4)  ADR:   _____  (0-4)              BDR: _____ (0-4)  A:        _____  (0-2)              B:      _____ (0-2)          DR: ______ (0-2)    Will Recipient Accept?   Accept HBcAB Positive Organ:            Yes  Accept HBV JIE Positive Organ:        No  Accept HCV Antibody Positive Organ: Yes   Accept HCV JIE Positive Organ: Yes    Dual Kidney and En Bloc Opt In : No  Dual  Local:   No  Dual Import:   No  En Bloc Local:   No  En Bloc Import: No     Accept KDPI > 85: Single: No     Local: No     Import: No  Accept KDPI > 85: Dual: No     Local: No     Import: No    ### NURSE TO VERIFY CONSENT AND MAKE ANY NECESSARY CHANGES NEEDED IN UNET AT " THE TIME OF VERIFICATION ###    Unacceptible Antigens  If yes, list     Lab Results   Component Value Date    ZY1WHNS  03/28/2024     Cw18, B60, Cw6, Cw15, B48, B81, B61, B73, B7, B27, Cw2, Cw5, B13, Cw17, B54, B56, B42, B67, A3, B55, Cw4, B47, B41, B82, B35, A66, B53, A23, B39, B59, B51, A24, A43, B49, B8, Cw1, B78, B75, B38, B52, B63, B71, B77    CIABCLM A*34:02, -- WEAK Cw7, A32, B37 03/28/2024    CIIAB  03/28/2024     DQ9, DQ7, DQ8, DR53, DR10, DQ4, DQ2, DR15, DR16, DR4       ### DO NOT LIST IF ANTIGEN VALUE WEAK ###    eGFR Wait Time Modification    Based on Race White does patient qualify for lab review? No     If found, generate eGFR Wait Time Modification Form and scan into Media.   Send patient letter when wait time is granted.     Hep B Vaccine series completed: yes    Blood Type x2 was verified by myself and Isrrael Juarez RN.  Blood type determination and reporting was completed according to the programs protocols and OPTN requirements.

## 2025-04-07 NOTE — LETTER
2025    Anna Gabriel   [place] [address]    Dear Anna Gabriel:  MRN: [id number]    Congratulations! On 2025, you were placed on  the waiting list for a  donor transplant.    Your candidacy for kidney transplant is based on the following criteria: ESRD.    Your transplant coordinator while on the waiting list is Doreen Aguirre RN. They can be reached at [phone] or [phone] with any questions.      What to do now?    Ask your living donors to begin testing   Share our screening website with anyone interested: [email or web address]  Make sure donors have your name and date of birth  You will get transplanted much faster if you have a living donor    Have your blood sent to our Transplant Lab every month  If you are on dialysis - our Transplant Lab will work with your dialysis unit to send your blood every month  If you are not on dialysis   If you live near an [facility] lab, we will schedule you to have blood drawn every month  If you do not live near an [facility] lab, you will be sent blood kits in the mail. You will need to take a kit to your local lab or doctor to have your blood drawn every month and mail to the Transplant Lab.     Call us with ANY CHANGES  Phone numbers - we must be able to reach you anytime of the day or night when a kidney is available  Address  Insurance coverage  Dialysis unit or kidney doctor  Tommy: if you have surgery, stay in the hospital, have to get blood, or have an infection    Review your Kidney/Kidney-Pancreas Transplant Guide   This will give you detailed information about what happens when  you are on the waiting list   you are called when a kidney is available    The [facility] has a transplant surgeon and physician available 365 days a year, 24 hours a day to coordinate organ acceptance, procurement, surgical placement and to address urgent patient care issues.  You will be notified in writing of any  changes to our Transplant Centers staffing plan that would impact your ability to receive a transplant.    Attached is a letter from the [employer] Organ [employer]aring (UN[employer]). It describes the services and information offered to patients by [employer] and the Organ Procurement and Transplant Network. We look forward to working with you while on the waiting list.     We would like to inform you of an important OPTN (Organ Procurement and Transplant Network) Policy change that may affect the waiting time for some candidates on our waiting list.     Waiting time is important in identifying who receives offers for kidneys. A long wait time may increase your chance of getting an offer. Wait time is based on a test called eGFR that tells how well your kidneys are working. Wait time could also be based on how long you have been on dialysis. Government and health officials have changed the way this test is used. Before this year, hospitals used an eGFR that would include your race. For Black or  Americans, this eGFR could have shown that their kidneys were working better than they were.    Because of this change, we are looking at everyones record and assessing waiting time for people who are eligible. We will be reviewing everyones medical records and will contact you if you are eligible.     Who can I talk to if I have a question?  You can contact us if you have questions or send a message through [facility].     Please give us time to answer your questions. We are working on this for many patients.    How can I learn more about eGFR and this policy change?  Go to OPTN website > Patients > Kidney > FAQ: Understanding race-neutral eGFR calculations  Full URL: https://optn.transplant.hrsa.gov/patients/by-organ/kidney/understanding-the-proposal-to-require-race-neutral-egfr-calculations/  Call the Organ Procurement and Transplantation Network (OPTN) toll-free patient services line at  3-871-424-5331    Congratulations,    Your Transplant Partner  [facility]   [facility] [place] [address]  [phone]  lh/enclosure    CC:  Bryan Wells MD          [facility]                                                   The Organ Procurement and Transplantation Network   Toll-free patient services line: 4-927-041-0445  Your resource for organ transplant information      Staffed 8:30 am - 5:00 pm ET Monday - Friday   Leave a message 24/7 to receive a call back    The Organ Procurement and Transplantation Network (OPTN) is the national transplant system. It makes the policies that decide how donated organs are matched to patients waiting for a transplant. The OPTN:    Makes sure donated organs get matched to people on the transplant waiting list  Tells people about the donation and transplant processes  Makes sure that the public knows about the need for more organ and tissue donations    The OPTN has a free patient services line that you can call to:  Get more information about:   o Organ donation and organ transplants   o Donation and transplant policies  Get an information kit with:   o A list of transplant hospitals   o Waiting list information  Talk about any questions you may have about your transplant hospital or organ procurement organization. The staff will do their best to help you or point you to others who may help.  Find out how you can volunteer with the OPTN and help shape transplant policy    The patient services line number is: 5-543-426-6501    Patient services line staff CANNOT answer questions about your own medical care, including:  Waiting list status  Test results  Medical records  You will need to call your transplant hospital for this information.    The following websites have more information about transplantation and donation:  OPTN: https://optn.transplant.[facility].gov/  For potential living donors and transplant recipients:   o  Living with transplant: https://www.transplantliving.org/   o Living donation process: https://optn.transplant.hrsa.gov/living-donation/     o Financial assistance: https://www.livingdonorassistance.org/  Transplantation data: https://www.srtr.org/  Organ donation: https://www.organdonor.gov/    Volunteer with the OPTN: https://optn.transplant.hrsa.gov/get-involved

## 2025-04-09 DIAGNOSIS — Z76.82 AWAITING ORGAN TRANSPLANT: Primary | ICD-10-CM

## 2025-04-11 ENCOUNTER — TELEPHONE (OUTPATIENT)
Dept: TRANSPLANT | Facility: CLINIC | Age: 25
End: 2025-04-11
Payer: MEDICARE

## 2025-04-11 NOTE — TELEPHONE ENCOUNTER
"A phone call to pt to confirm we have the correct dialysis info. Pt confirmed:  [facility]    Phone: [phone] Fax: [phone]   Address:  [address] [place] [address] [place] [address]      No further questions.  ----- Message from Lm sent at 4/11/2025  1:14 PM CDT -----  Regarding: miss call  Consult/Advisory:  Name Of Caller: Self  Contact Preference?:  What is the nature of the call?: Returning call to Isela  Additional Notes:"Thank you for all that you do for our patients"  "

## 2025-05-04 ENCOUNTER — TELEPHONE (OUTPATIENT)
Dept: TRANSPLANT | Facility: CLINIC | Age: 25
End: 2025-05-04
Payer: MEDICARE

## 2025-05-04 DIAGNOSIS — Z76.82 AWAITING ORGAN TRANSPLANT STATUS: Primary | ICD-10-CM

## 2025-05-04 NOTE — TELEPHONE ENCOUNTER
ON-CALL NOTE    [employer]# [id number]    Notified by Dayday Morales, , that Anna Gabriel is eligible for kidney offer.  Spoke with patient and identified no acute medical issues with telephone assessment. Protocol script read to patient regarding N/A, standard donor offer. Patient verbalized understanding, all questions answered, patient accepts organ offer. Notified by Dayday Morales that virtual crossmatch is not done due to no blood within 120 days.  Patient will come in to have sample collected in ER. Her ETA is 3 hours. ER charge nurse frank notified. Patient reports no sensitizing event since last blood sample for PRA received. Notified Meli in HLA Lab to perform a prospective  crossmatch per guideline.    Patient notified of plan to come in for lab draw for prospective crossmatch and call me once collected. I will call her with updates on case as I receive them. Patient and her mom given time to ask questions ans all question answered. She verbalized understanding of all information discussed.    1616 patient called and informed me that she is in the ER for her lab collect.    1945 Dayday called and informed me that Donor OR set for 1000 on 5/5/2025. Patient updated.Prospective crossmatches are pending.

## 2025-05-05 NOTE — TELEPHONE ENCOUNTER
ON-CALL NOTE    [employer]OS # [id number]    5/5/25:  07:00am Received case from Doreen Aguirre RN.  09:30am Called HLA lab to check status of prospective crossmatch, Adrian confirms that sample was received from pt, donor sample expected to arrive today by 1115am.  11:15- prospective crossmatch started  13:20- received update from Dayday NOGUEIRA: this case has been put on hold.  Notified pt and confirmed that she is still on standby and explained that this is not uncommon and this case could be on hold for several days but she will updated with status changes as soon as possible.  Anna verbalized understanding, no questions or concerns at present time.  19:15- received update from Luis: crossmatch is negative, donor OR has been set for 5/6/25 at     11:00am.  Notified pt and confirmed that she is still on stand-by as back-up candidate.   She will get next update tomorrow as soon as we are notified.

## 2025-05-06 ENCOUNTER — TELEPHONE (OUTPATIENT)
Dept: TRANSPLANT | Facility: CLINIC | Age: 25
End: 2025-05-06
Payer: MEDICARE

## 2025-05-06 DIAGNOSIS — Z76.82 ORGAN TRANSPLANT CANDIDATE: ICD-10-CM

## 2025-05-06 DIAGNOSIS — N18.6 END STAGE RENAL DISEASE: Primary | ICD-10-CM

## 2025-05-07 ENCOUNTER — ANESTHESIA EVENT (OUTPATIENT)
Dept: SURGERY | Facility: HOSPITAL | Age: 25
End: 2025-05-07

## 2025-05-07 ENCOUNTER — APPOINTMENT (OUTPATIENT)
Dept: RADIOLOGY | Facility: HOSPITAL | Age: 25
End: 2025-05-07
Attending: TRANSPLANT SURGERY
Payer: MEDICARE

## 2025-05-07 ENCOUNTER — ANESTHESIA (OUTPATIENT)
Dept: SURGERY | Facility: HOSPITAL | Age: 25
End: 2025-05-07
Payer: MEDICARE

## 2025-05-07 ENCOUNTER — HOSPITAL ENCOUNTER (INPATIENT)
Facility: HOSPITAL | Age: 25
LOS: 6 days | Discharge: HOME | DRG: 650 | End: 2025-05-13
Attending: TRANSPLANT SURGERY | Admitting: TRANSPLANT SURGERY
Payer: MEDICARE

## 2025-05-07 DIAGNOSIS — N05.1 FSGS (FOCAL SEGMENTAL GLOMERULOSCLEROSIS): Primary | ICD-10-CM

## 2025-05-07 DIAGNOSIS — R00.0 TACHYCARDIA: ICD-10-CM

## 2025-05-07 DIAGNOSIS — I95.9 HYPOTENSION, UNSPECIFIED HYPOTENSION TYPE: ICD-10-CM

## 2025-05-07 DIAGNOSIS — Z94.0 STATUS POST DECEASED-DONOR KIDNEY TRANSPLANTATION: Primary | ICD-10-CM

## 2025-05-07 DIAGNOSIS — T86.19 DELAYED GRAFT FUNCTION OF KIDNEY TRANSPLANT DUE TO ATN REQUIRING ACUTE DIALYSIS: ICD-10-CM

## 2025-05-07 DIAGNOSIS — Z99.2 DELAYED GRAFT FUNCTION OF KIDNEY TRANSPLANT DUE TO ATN REQUIRING ACUTE DIALYSIS: ICD-10-CM

## 2025-05-07 DIAGNOSIS — I95.9 HYPOTENSION: ICD-10-CM

## 2025-05-07 DIAGNOSIS — T86.12 FAILED KIDNEY TRANSPLANT: ICD-10-CM

## 2025-05-07 DIAGNOSIS — Z91.89 AT RISK FOR OPPORTUNISTIC INFECTIONS: ICD-10-CM

## 2025-05-07 DIAGNOSIS — R73.9 HYPERGLYCEMIA: ICD-10-CM

## 2025-05-07 DIAGNOSIS — N18.6 BENIGN HYPERTENSION WITH ESRD (END-STAGE RENAL DISEASE): ICD-10-CM

## 2025-05-07 DIAGNOSIS — N05.1 FSGS (FOCAL SEGMENTAL GLOMERULOSCLEROSIS): ICD-10-CM

## 2025-05-07 DIAGNOSIS — Z94.0 KIDNEY REPLACED BY TRANSPLANT: Primary | ICD-10-CM

## 2025-05-07 DIAGNOSIS — N18.6 ESRD (END STAGE RENAL DISEASE): ICD-10-CM

## 2025-05-07 DIAGNOSIS — Z29.89 PROPHYLACTIC IMMUNOTHERAPY: ICD-10-CM

## 2025-05-07 DIAGNOSIS — D63.1 ANEMIA IN CHRONIC KIDNEY DISEASE, ON CHRONIC DIALYSIS: ICD-10-CM

## 2025-05-07 DIAGNOSIS — Z01.818 PRE-OP EVALUATION: ICD-10-CM

## 2025-05-07 DIAGNOSIS — Z79.60 LONG-TERM USE OF IMMUNOSUPPRESSANT MEDICATION: ICD-10-CM

## 2025-05-07 DIAGNOSIS — I12.0 BENIGN HYPERTENSION WITH ESRD (END-STAGE RENAL DISEASE): ICD-10-CM

## 2025-05-07 DIAGNOSIS — N18.6 ANEMIA IN CHRONIC KIDNEY DISEASE, ON CHRONIC DIALYSIS: ICD-10-CM

## 2025-05-07 DIAGNOSIS — Z99.2 ANEMIA IN CHRONIC KIDNEY DISEASE, ON CHRONIC DIALYSIS: ICD-10-CM

## 2025-05-07 PROBLEM — T38.0X5A ADRENAL CORTICAL STEROIDS CAUSING ADVERSE EFFECT IN THERAPEUTIC USE: Status: ACTIVE | Noted: 2025-05-07

## 2025-05-07 LAB
ABSOLUTE EOSINOPHIL (OHS): 0.03 K/UL
ABSOLUTE EOSINOPHIL (OHS): 1.49 K/UL
ABSOLUTE MONOCYTE (OHS): 0.42 K/UL (ref 0.3–1)
ABSOLUTE MONOCYTE (OHS): 0.63 K/UL (ref 0.3–1)
ABSOLUTE NEUTROPHIL COUNT (OHS): 11.28 K/UL (ref 1.8–7.7)
ABSOLUTE NEUTROPHIL COUNT (OHS): 3.34 K/UL (ref 1.8–7.7)
ALBUMIN SERPL BCP-MCNC: 2 G/DL (ref 3.5–5.2)
ALBUMIN SERPL BCP-MCNC: 2.5 G/DL (ref 3.5–5.2)
ALP SERPL-CCNC: 189 UNIT/L (ref 40–150)
ALT SERPL W/O P-5'-P-CCNC: 16 UNIT/L (ref 10–44)
ANION GAP (OHS): 13 MMOL/L (ref 8–16)
ANION GAP (OHS): 15 MMOL/L (ref 8–16)
ANION GAP (OHS): 15 MMOL/L (ref 8–16)
APPEARANCE FLD: CLEAR
APTT PPP: 25.7 SECONDS (ref 21–32)
AST SERPL-CCNC: 16 UNIT/L (ref 11–45)
BASOPHILS # BLD AUTO: 0.02 K/UL
BASOPHILS # BLD AUTO: 0.08 K/UL
BASOPHILS NFR BLD AUTO: 0.2 %
BASOPHILS NFR BLD AUTO: 1 %
BILIRUB SERPL-MCNC: 0.4 MG/DL (ref 0.1–1)
BUN SERPL-MCNC: 44 MG/DL (ref 6–20)
BUN SERPL-MCNC: 45 MG/DL (ref 6–20)
BUN SERPL-MCNC: 45 MG/DL (ref 6–20)
CALCIUM SERPL-MCNC: 6.7 MG/DL (ref 8.7–10.5)
CALCIUM SERPL-MCNC: 7.2 MG/DL (ref 8.7–10.5)
CALCIUM SERPL-MCNC: 7.9 MG/DL (ref 8.7–10.5)
CHLORIDE SERPL-SCNC: 101 MMOL/L (ref 95–110)
CHLORIDE SERPL-SCNC: 102 MMOL/L (ref 95–110)
CHLORIDE SERPL-SCNC: 98 MMOL/L (ref 95–110)
CO2 SERPL-SCNC: 20 MMOL/L (ref 23–29)
CO2 SERPL-SCNC: 21 MMOL/L (ref 23–29)
CO2 SERPL-SCNC: 26 MMOL/L (ref 23–29)
COLOR FLD: NORMAL
CREAT SERPL-MCNC: 10.3 MG/DL (ref 0.5–1.4)
CREAT SERPL-MCNC: 9.6 MG/DL (ref 0.5–1.4)
CREAT SERPL-MCNC: 9.7 MG/DL (ref 0.5–1.4)
EOSINOPHIL NFR FLD MANUAL: 3 %
ERYTHROCYTE [DISTWIDTH] IN BLOOD BY AUTOMATED COUNT: 16.4 % (ref 11.5–14.5)
ERYTHROCYTE [DISTWIDTH] IN BLOOD BY AUTOMATED COUNT: 16.6 % (ref 11.5–14.5)
GFR SERPLBLD CREATININE-BSD FMLA CKD-EPI: 5 ML/MIN/1.73/M2
GLUCOSE SERPL-MCNC: 103 MG/DL (ref 70–110)
GLUCOSE SERPL-MCNC: 139 MG/DL (ref 70–110)
GLUCOSE SERPL-MCNC: 150 MG/DL (ref 70–110)
GLUCOSE SERPL-MCNC: 75 MG/DL (ref 70–110)
GLUCOSE SERPL-MCNC: 93 MG/DL (ref 70–110)
GRAM STN SPEC: NORMAL
GRAM STN SPEC: NORMAL
HBV CORE AB SERPL QL IA: NORMAL
HBV CORE IGM SERPL QL IA: NORMAL
HBV SURFACE AG SERPL QL IA: NORMAL
HCG INTACT+B SERPL-ACNC: <2.4 MIU/ML
HCO3 UR-SCNC: 24.4 MMOL/L (ref 24–28)
HCT VFR BLD AUTO: 29.5 % (ref 37–48.5)
HCT VFR BLD AUTO: 30 % (ref 37–48.5)
HCT VFR BLD AUTO: 30.4 % (ref 37–48.5)
HCT VFR BLD CALC: 25 %PCV (ref 36–54)
HCV AB SERPL QL IA: NORMAL
HGB BLD-MCNC: 9.3 GM/DL (ref 12–16)
HGB BLD-MCNC: 9.3 GM/DL (ref 12–16)
HIV 1+2 AB+HIV1 P24 AG SERPL QL IA: NORMAL
IMM GRANULOCYTES # BLD AUTO: 0.05 K/UL (ref 0–0.04)
IMM GRANULOCYTES # BLD AUTO: 0.07 K/UL (ref 0–0.04)
IMM GRANULOCYTES NFR BLD AUTO: 0.6 % (ref 0–0.5)
IMM GRANULOCYTES NFR BLD AUTO: 0.6 % (ref 0–0.5)
INDIRECT COOMBS: NORMAL
INR PPP: 1 (ref 0.8–1.2)
LYMPHOCYTES # BLD AUTO: 0.11 K/UL (ref 1–4.8)
LYMPHOCYTES # BLD AUTO: 2.12 K/UL (ref 1–4.8)
LYMPHOCYTES NFR FLD MANUAL: 23 %
MCH RBC QN AUTO: 30.5 PG (ref 27–31)
MCH RBC QN AUTO: 31.4 PG (ref 27–31)
MCHC RBC AUTO-ENTMCNC: 30.6 G/DL (ref 32–36)
MCHC RBC AUTO-ENTMCNC: 31 G/DL (ref 32–36)
MCV RBC AUTO: 100 FL (ref 82–98)
MCV RBC AUTO: 101 FL (ref 82–98)
MESOTHL CELL NFR FLD MANUAL: 1 %
MONOS+MACROS NFR FLD MANUAL: 73 %
NUCLEATED RBC (/100WBC) (OHS): 0 /100 WBC
NUCLEATED RBC (/100WBC) (OHS): 0 /100 WBC
OHS QRS DURATION: 82 MS
OHS QTC CALCULATION: 470 MS
PCO2 BLDA: 44.2 MMHG (ref 35–45)
PH SMN: 7.35 [PH] (ref 7.35–7.45)
PHOSPHATE SERPL-MCNC: 6.2 MG/DL (ref 2.7–4.5)
PHOSPHATE SERPL-MCNC: 6.4 MG/DL (ref 2.7–4.5)
PLATELET # BLD AUTO: 271 K/UL (ref 150–450)
PLATELET # BLD AUTO: 279 K/UL (ref 150–450)
PMV BLD AUTO: 8.8 FL (ref 9.2–12.9)
PMV BLD AUTO: 8.9 FL (ref 9.2–12.9)
PO2 BLDA: 27 MMHG (ref 40–60)
POC BE: -1 MMOL/L (ref -2–2)
POC IONIZED CALCIUM: 0.95 MMOL/L (ref 1.06–1.42)
POC SATURATED O2: 47 % (ref 95–100)
POC TCO2: 26 MMOL/L (ref 24–29)
POCT GLUCOSE: 103 MG/DL (ref 70–110)
POCT GLUCOSE: 158 MG/DL (ref 70–110)
POCT GLUCOSE: 90 MG/DL (ref 70–110)
POTASSIUM BLD-SCNC: 3.9 MMOL/L (ref 3.5–5.1)
POTASSIUM SERPL-SCNC: 3.8 MMOL/L (ref 3.5–5.1)
POTASSIUM SERPL-SCNC: 4.8 MMOL/L (ref 3.5–5.1)
POTASSIUM SERPL-SCNC: 5.4 MMOL/L (ref 3.5–5.1)
PROT SERPL-MCNC: 5.7 GM/DL (ref 6–8.4)
PROTHROMBIN TIME: 10.8 SECONDS (ref 9–12.5)
RBC # BLD AUTO: 2.96 M/UL (ref 4–5.4)
RBC # BLD AUTO: 3.05 M/UL (ref 4–5.4)
RELATIVE EOSINOPHIL (OHS): 0.3 %
RELATIVE EOSINOPHIL (OHS): 19.3 %
RELATIVE LYMPHOCYTE (OHS): 0.9 % (ref 18–48)
RELATIVE LYMPHOCYTE (OHS): 27.5 % (ref 18–48)
RELATIVE MONOCYTE (OHS): 3.5 % (ref 4–15)
RELATIVE MONOCYTE (OHS): 8.2 % (ref 4–15)
RELATIVE NEUTROPHIL (OHS): 43.4 % (ref 38–73)
RELATIVE NEUTROPHIL (OHS): 94.5 % (ref 38–73)
RH BLD: NORMAL
SAMPLE: ABNORMAL
SODIUM BLD-SCNC: 139 MMOL/L (ref 136–145)
SODIUM SERPL-SCNC: 136 MMOL/L (ref 136–145)
SODIUM SERPL-SCNC: 136 MMOL/L (ref 136–145)
SODIUM SERPL-SCNC: 139 MMOL/L (ref 136–145)
SPECIMEN OUTDATE: NORMAL
TROPONIN I SERPL HS-MCNC: 5 NG/L
WBC # BLD AUTO: 11.93 K/UL (ref 3.9–12.7)
WBC # BLD AUTO: 7.71 K/UL (ref 3.9–12.7)
WBC # FLD: 21 /CU MM

## 2025-05-07 PROCEDURE — 87075 CULTR BACTERIA EXCEPT BLOOD: CPT | Mod: NTX

## 2025-05-07 PROCEDURE — 76000 FLUOROSCOPY <1 HR PHYS/QHP: CPT | Mod: TC,TXP

## 2025-05-07 PROCEDURE — 63600175 PHARM REV CODE 636 W HCPCS: Performed by: TRANSPLANT SURGERY

## 2025-05-07 PROCEDURE — 25000003 PHARM REV CODE 250: Mod: NTX

## 2025-05-07 PROCEDURE — 63600175 PHARM REV CODE 636 W HCPCS: Mod: UD,NTX

## 2025-05-07 PROCEDURE — 80048 BASIC METABOLIC PNL TOTAL CA: CPT | Mod: XB | Performed by: PHYSICIAN ASSISTANT

## 2025-05-07 PROCEDURE — 37000008 HC ANESTHESIA 1ST 15 MINUTES: Performed by: TRANSPLANT SURGERY

## 2025-05-07 PROCEDURE — 63600175 PHARM REV CODE 636 W HCPCS: Mod: UD

## 2025-05-07 PROCEDURE — 87070 CULTURE OTHR SPECIMN AEROBIC: CPT | Mod: NTX

## 2025-05-07 PROCEDURE — 82947 ASSAY GLUCOSE BLOOD QUANT: CPT | Mod: NTX | Performed by: TRANSPLANT SURGERY

## 2025-05-07 PROCEDURE — 99222 1ST HOSP IP/OBS MODERATE 55: CPT | Mod: ,,,

## 2025-05-07 PROCEDURE — 87522 HEPATITIS C REVRS TRNSCRPJ: CPT | Performed by: TRANSPLANT SURGERY

## 2025-05-07 PROCEDURE — 93005 ELECTROCARDIOGRAM TRACING: CPT

## 2025-05-07 PROCEDURE — 85610 PROTHROMBIN TIME: CPT | Mod: NTX | Performed by: TRANSPLANT SURGERY

## 2025-05-07 PROCEDURE — 63600175 PHARM REV CODE 636 W HCPCS

## 2025-05-07 PROCEDURE — 25000003 PHARM REV CODE 250

## 2025-05-07 PROCEDURE — 27201423 OPTIME MED/SURG SUP & DEVICES STERILE SUPPLY: Performed by: TRANSPLANT SURGERY

## 2025-05-07 PROCEDURE — 63600175 PHARM REV CODE 636 W HCPCS: Mod: UD | Performed by: NURSE ANESTHETIST, CERTIFIED REGISTERED

## 2025-05-07 PROCEDURE — 80069 RENAL FUNCTION PANEL: CPT | Performed by: STUDENT IN AN ORGANIZED HEALTH CARE EDUCATION/TRAINING PROGRAM

## 2025-05-07 PROCEDURE — 84100 ASSAY OF PHOSPHORUS: CPT | Mod: NTX | Performed by: TRANSPLANT SURGERY

## 2025-05-07 PROCEDURE — 81300002 HC KIDNEY TRANSPORT, GROUND 4-5 HOURS

## 2025-05-07 PROCEDURE — 63600175 PHARM REV CODE 636 W HCPCS: Mod: UD | Performed by: STUDENT IN AN ORGANIZED HEALTH CARE EDUCATION/TRAINING PROGRAM

## 2025-05-07 PROCEDURE — 85014 HEMATOCRIT: CPT | Performed by: STUDENT IN AN ORGANIZED HEALTH CARE EDUCATION/TRAINING PROGRAM

## 2025-05-07 PROCEDURE — 85025 COMPLETE CBC W/AUTO DIFF WBC: CPT | Performed by: PHYSICIAN ASSISTANT

## 2025-05-07 PROCEDURE — 87389 HIV-1 AG W/HIV-1&-2 AB AG IA: CPT | Mod: NTX | Performed by: TRANSPLANT SURGERY

## 2025-05-07 PROCEDURE — 71000033 HC RECOVERY, INTIAL HOUR: Performed by: TRANSPLANT SURGERY

## 2025-05-07 PROCEDURE — 85025 COMPLETE CBC W/AUTO DIFF WBC: CPT | Mod: NTX | Performed by: TRANSPLANT SURGERY

## 2025-05-07 PROCEDURE — 87522 HEPATITIS C REVRS TRNSCRPJ: CPT | Mod: NTX | Performed by: TRANSPLANT SURGERY

## 2025-05-07 PROCEDURE — 86705 HEP B CORE ANTIBODY IGM: CPT | Mod: NTX | Performed by: TRANSPLANT SURGERY

## 2025-05-07 PROCEDURE — 25000003 PHARM REV CODE 250: Mod: UD | Performed by: ANESTHESIOLOGY

## 2025-05-07 PROCEDURE — 86850 RBC ANTIBODY SCREEN: CPT | Mod: NTX

## 2025-05-07 PROCEDURE — 71000039 HC RECOVERY, EACH ADD'L HOUR: Performed by: TRANSPLANT SURGERY

## 2025-05-07 PROCEDURE — 36000930 HC OR TIME LEV VII 1ST 15 MIN: Performed by: TRANSPLANT SURGERY

## 2025-05-07 PROCEDURE — 63600175 PHARM REV CODE 636 W HCPCS: Mod: TB,UD

## 2025-05-07 PROCEDURE — 93010 ELECTROCARDIOGRAM REPORT: CPT | Mod: NTX,,, | Performed by: INTERNAL MEDICINE

## 2025-05-07 PROCEDURE — 0TY00Z0 TRANSPLANTATION OF RIGHT KIDNEY, ALLOGENEIC, OPEN APPROACH: ICD-10-PCS | Performed by: TRANSPLANT SURGERY

## 2025-05-07 PROCEDURE — 25000003 PHARM REV CODE 250: Performed by: STUDENT IN AN ORGANIZED HEALTH CARE EDUCATION/TRAINING PROGRAM

## 2025-05-07 PROCEDURE — 86803 HEPATITIS C AB TEST: CPT | Mod: NTX | Performed by: TRANSPLANT SURGERY

## 2025-05-07 PROCEDURE — C1751 CATH, INF, PER/CENT/MIDLINE: HCPCS | Performed by: ANESTHESIOLOGY

## 2025-05-07 PROCEDURE — 93010 ELECTROCARDIOGRAM REPORT: CPT | Mod: ,,, | Performed by: INTERNAL MEDICINE

## 2025-05-07 PROCEDURE — 86704 HEP B CORE ANTIBODY TOTAL: CPT | Mod: NTX | Performed by: TRANSPLANT SURGERY

## 2025-05-07 PROCEDURE — 37000009 HC ANESTHESIA EA ADD 15 MINS: Performed by: TRANSPLANT SURGERY

## 2025-05-07 PROCEDURE — 89051 BODY FLUID CELL COUNT: CPT | Mod: NTX | Performed by: TRANSPLANT SURGERY

## 2025-05-07 PROCEDURE — 71000015 HC POSTOP RECOV 1ST HR: Performed by: TRANSPLANT SURGERY

## 2025-05-07 PROCEDURE — S5010 5% DEXTROSE AND 0.45% SALINE: HCPCS | Performed by: STUDENT IN AN ORGANIZED HEALTH CARE EDUCATION/TRAINING PROGRAM

## 2025-05-07 PROCEDURE — 25000003 PHARM REV CODE 250: Performed by: PHYSICIAN ASSISTANT

## 2025-05-07 PROCEDURE — 0WPG33Z REMOVAL OF INFUSION DEVICE FROM PERITONEAL CAVITY, PERCUTANEOUS APPROACH: ICD-10-PCS | Performed by: TRANSPLANT SURGERY

## 2025-05-07 PROCEDURE — 36415 COLL VENOUS BLD VENIPUNCTURE: CPT | Mod: NTX | Performed by: TRANSPLANT SURGERY

## 2025-05-07 PROCEDURE — 84702 CHORIONIC GONADOTROPIN TEST: CPT | Mod: NTX | Performed by: TRANSPLANT SURGERY

## 2025-05-07 PROCEDURE — 27000207 HC ISOLATION

## 2025-05-07 PROCEDURE — 84484 ASSAY OF TROPONIN QUANT: CPT | Performed by: PHYSICIAN ASSISTANT

## 2025-05-07 PROCEDURE — 85730 THROMBOPLASTIN TIME PARTIAL: CPT | Mod: NTX | Performed by: TRANSPLANT SURGERY

## 2025-05-07 PROCEDURE — C2617 STENT, NON-COR, TEM W/O DEL: HCPCS | Performed by: TRANSPLANT SURGERY

## 2025-05-07 PROCEDURE — 25000003 PHARM REV CODE 250: Performed by: NURSE ANESTHETIST, CERTIFIED REGISTERED

## 2025-05-07 PROCEDURE — 20600001 HC STEP DOWN PRIVATE ROOM

## 2025-05-07 PROCEDURE — 71000016 HC POSTOP RECOV ADDL HR: Performed by: TRANSPLANT SURGERY

## 2025-05-07 PROCEDURE — 27800505 HC CATH, RADIAL ARTERY KIT: Performed by: ANESTHESIOLOGY

## 2025-05-07 PROCEDURE — 86706 HEP B SURFACE ANTIBODY: CPT | Mod: NTX | Performed by: TRANSPLANT SURGERY

## 2025-05-07 PROCEDURE — 81200001 HC KIDNEY ACQUISITION - CADAVER

## 2025-05-07 PROCEDURE — 93005 ELECTROCARDIOGRAM TRACING: CPT | Mod: NTX

## 2025-05-07 PROCEDURE — 99223 1ST HOSP IP/OBS HIGH 75: CPT | Mod: 57,AI,NTX,

## 2025-05-07 PROCEDURE — 87205 SMEAR GRAM STAIN: CPT | Mod: NTX

## 2025-05-07 PROCEDURE — 63600175 PHARM REV CODE 636 W HCPCS: Performed by: NURSE ANESTHETIST, CERTIFIED REGISTERED

## 2025-05-07 PROCEDURE — 87340 HEPATITIS B SURFACE AG IA: CPT | Mod: NTX | Performed by: TRANSPLANT SURGERY

## 2025-05-07 PROCEDURE — C1729 CATH, DRAINAGE: HCPCS | Performed by: TRANSPLANT SURGERY

## 2025-05-07 PROCEDURE — 36000931 HC OR TIME LEV VII EA ADD 15 MIN: Performed by: TRANSPLANT SURGERY

## 2025-05-07 DEVICE — IMPLANTABLE DEVICE: Type: IMPLANTABLE DEVICE | Site: URETER | Status: FUNCTIONAL

## 2025-05-07 RX ORDER — IBUPROFEN 200 MG
16 TABLET ORAL
Status: DISCONTINUED | OUTPATIENT
Start: 2025-05-07 | End: 2025-05-10

## 2025-05-07 RX ORDER — HYDROMORPHONE HYDROCHLORIDE 1 MG/ML
INJECTION, SOLUTION INTRAMUSCULAR; INTRAVENOUS; SUBCUTANEOUS
Status: COMPLETED
Start: 2025-05-07 | End: 2025-05-07

## 2025-05-07 RX ORDER — IBUPROFEN 200 MG
16 TABLET ORAL
Status: DISCONTINUED | OUTPATIENT
Start: 2025-05-07 | End: 2025-05-13 | Stop reason: HOSPADM

## 2025-05-07 RX ORDER — MANNITOL 250 MG/ML
INJECTION, SOLUTION INTRAVENOUS
Status: DISCONTINUED | OUTPATIENT
Start: 2025-05-07 | End: 2025-05-07

## 2025-05-07 RX ORDER — MUPIROCIN 20 MG/G
1 OINTMENT TOPICAL 2 TIMES DAILY
Status: COMPLETED | OUTPATIENT
Start: 2025-05-07 | End: 2025-05-12

## 2025-05-07 RX ORDER — PHENYLEPHRINE HYDROCHLORIDE 10 MG/ML
INJECTION INTRAVENOUS
Status: DISCONTINUED | OUTPATIENT
Start: 2025-05-07 | End: 2025-05-07

## 2025-05-07 RX ORDER — SODIUM CHLORIDE 9 MG/ML
INJECTION, SOLUTION INTRAVENOUS CONTINUOUS
Status: DISCONTINUED | OUTPATIENT
Start: 2025-05-07 | End: 2025-05-08

## 2025-05-07 RX ORDER — DIPHENHYDRAMINE HYDROCHLORIDE 50 MG/ML
50 INJECTION, SOLUTION INTRAMUSCULAR; INTRAVENOUS ONCE
Status: COMPLETED | OUTPATIENT
Start: 2025-05-07 | End: 2025-05-07

## 2025-05-07 RX ORDER — SULFAMETHOXAZOLE AND TRIMETHOPRIM 400; 80 MG/1; MG/1
1 TABLET ORAL EVERY MORNING
Status: DISCONTINUED | OUTPATIENT
Start: 2025-05-17 | End: 2025-05-13 | Stop reason: HOSPADM

## 2025-05-07 RX ORDER — VALGANCICLOVIR 450 MG/1
450 TABLET, FILM COATED ORAL EVERY MORNING
Status: DISCONTINUED | OUTPATIENT
Start: 2025-05-17 | End: 2025-05-13 | Stop reason: HOSPADM

## 2025-05-07 RX ORDER — BISACODYL 5 MG
10 TABLET, DELAYED RELEASE (ENTERIC COATED) ORAL NIGHTLY
Status: DISCONTINUED | OUTPATIENT
Start: 2025-05-07 | End: 2025-05-13 | Stop reason: HOSPADM

## 2025-05-07 RX ORDER — ONDANSETRON HYDROCHLORIDE 2 MG/ML
4 INJECTION, SOLUTION INTRAVENOUS DAILY PRN
Status: DISCONTINUED | OUTPATIENT
Start: 2025-05-07 | End: 2025-05-07 | Stop reason: HOSPADM

## 2025-05-07 RX ORDER — GLUCAGON 1 MG
1 KIT INJECTION CONTINUOUS PRN
Status: DISCONTINUED | OUTPATIENT
Start: 2025-05-07 | End: 2025-05-13 | Stop reason: HOSPADM

## 2025-05-07 RX ORDER — CEFAZOLIN 2 G/1
2 INJECTION, POWDER, FOR SOLUTION INTRAMUSCULAR; INTRAVENOUS
Status: DISCONTINUED | OUTPATIENT
Start: 2025-05-07 | End: 2025-05-07

## 2025-05-07 RX ORDER — TRAMADOL HYDROCHLORIDE 50 MG/1
50 TABLET, FILM COATED ORAL EVERY 6 HOURS PRN
Status: DISCONTINUED | OUTPATIENT
Start: 2025-05-07 | End: 2025-05-08

## 2025-05-07 RX ORDER — HYDROMORPHONE HYDROCHLORIDE 1 MG/ML
0.2 INJECTION, SOLUTION INTRAMUSCULAR; INTRAVENOUS; SUBCUTANEOUS EVERY 5 MIN PRN
Status: DISCONTINUED | OUTPATIENT
Start: 2025-05-07 | End: 2025-05-07 | Stop reason: HOSPADM

## 2025-05-07 RX ORDER — HEPARIN SODIUM 5000 [USP'U]/ML
5000 INJECTION, SOLUTION INTRAVENOUS; SUBCUTANEOUS EVERY 8 HOURS
Status: DISCONTINUED | OUTPATIENT
Start: 2025-05-07 | End: 2025-05-11

## 2025-05-07 RX ORDER — HEPARIN SODIUM 5000 [USP'U]/ML
5000 INJECTION, SOLUTION INTRAVENOUS; SUBCUTANEOUS ONCE
Status: COMPLETED | OUTPATIENT
Start: 2025-05-07 | End: 2025-05-07

## 2025-05-07 RX ORDER — CEFAZOLIN 2 G/1
2 INJECTION, POWDER, FOR SOLUTION INTRAMUSCULAR; INTRAVENOUS
Status: COMPLETED | OUTPATIENT
Start: 2025-05-07 | End: 2025-05-08

## 2025-05-07 RX ORDER — CALCIUM GLUCONATE 20 MG/ML
1 INJECTION, SOLUTION INTRAVENOUS ONCE
Status: COMPLETED | OUTPATIENT
Start: 2025-05-08 | End: 2025-05-08

## 2025-05-07 RX ORDER — LORAZEPAM 2 MG/ML
0.25 INJECTION INTRAMUSCULAR ONCE AS NEEDED
Status: DISCONTINUED | OUTPATIENT
Start: 2025-05-07 | End: 2025-05-07 | Stop reason: HOSPADM

## 2025-05-07 RX ORDER — MIDODRINE HYDROCHLORIDE 5 MG/1
5 TABLET ORAL ONCE
Status: COMPLETED | OUTPATIENT
Start: 2025-05-07 | End: 2025-05-07

## 2025-05-07 RX ORDER — LIDOCAINE HYDROCHLORIDE 20 MG/ML
INJECTION INTRAVENOUS
Status: DISCONTINUED | OUTPATIENT
Start: 2025-05-07 | End: 2025-05-07

## 2025-05-07 RX ORDER — GLUCAGON 1 MG
1 KIT INJECTION
Status: DISCONTINUED | OUTPATIENT
Start: 2025-05-07 | End: 2025-05-07 | Stop reason: HOSPADM

## 2025-05-07 RX ORDER — FUROSEMIDE 10 MG/ML
100 INJECTION INTRAMUSCULAR; INTRAVENOUS ONCE
Status: COMPLETED | OUTPATIENT
Start: 2025-05-08 | End: 2025-05-08

## 2025-05-07 RX ORDER — DOCUSATE SODIUM 100 MG/1
100 CAPSULE, LIQUID FILLED ORAL 3 TIMES DAILY
Status: DISCONTINUED | OUTPATIENT
Start: 2025-05-07 | End: 2025-05-13 | Stop reason: HOSPADM

## 2025-05-07 RX ORDER — TACROLIMUS 1 MG/1
3 CAPSULE ORAL 2 TIMES DAILY
Status: DISCONTINUED | OUTPATIENT
Start: 2025-05-07 | End: 2025-05-10

## 2025-05-07 RX ORDER — HYDROMORPHONE HYDROCHLORIDE 1 MG/ML
0.2 INJECTION, SOLUTION INTRAMUSCULAR; INTRAVENOUS; SUBCUTANEOUS EVERY 5 MIN PRN
Refills: 0 | Status: DISCONTINUED | OUTPATIENT
Start: 2025-05-07 | End: 2025-05-07

## 2025-05-07 RX ORDER — FAMOTIDINE 20 MG/1
20 TABLET, FILM COATED ORAL NIGHTLY
Status: DISCONTINUED | OUTPATIENT
Start: 2025-05-07 | End: 2025-05-13 | Stop reason: HOSPADM

## 2025-05-07 RX ORDER — MIDAZOLAM HYDROCHLORIDE 1 MG/ML
INJECTION INTRAMUSCULAR; INTRAVENOUS
Status: DISCONTINUED | OUTPATIENT
Start: 2025-05-07 | End: 2025-05-07

## 2025-05-07 RX ORDER — INSULIN ASPART 100 [IU]/ML
0-5 INJECTION, SOLUTION INTRAVENOUS; SUBCUTANEOUS
Status: DISCONTINUED | OUTPATIENT
Start: 2025-05-07 | End: 2025-05-11

## 2025-05-07 RX ORDER — FENTANYL CITRATE 50 UG/ML
INJECTION, SOLUTION INTRAMUSCULAR; INTRAVENOUS
Status: DISCONTINUED | OUTPATIENT
Start: 2025-05-07 | End: 2025-05-07

## 2025-05-07 RX ORDER — IBUPROFEN 200 MG
24 TABLET ORAL
Status: DISCONTINUED | OUTPATIENT
Start: 2025-05-07 | End: 2025-05-10

## 2025-05-07 RX ORDER — CEFAZOLIN 2 G/1
2 INJECTION, POWDER, FOR SOLUTION INTRAMUSCULAR; INTRAVENOUS
Status: CANCELLED | OUTPATIENT
Start: 2025-05-07

## 2025-05-07 RX ORDER — PREDNISONE 20 MG/1
20 TABLET ORAL DAILY
Status: DISCONTINUED | OUTPATIENT
Start: 2025-05-09 | End: 2025-05-13 | Stop reason: HOSPADM

## 2025-05-07 RX ORDER — SODIUM CHLORIDE 9 MG/ML
INJECTION, SOLUTION INTRAVENOUS CONTINUOUS
Status: DISCONTINUED | OUTPATIENT
Start: 2025-05-07 | End: 2025-05-07

## 2025-05-07 RX ORDER — GLUCAGON 1 MG
1 KIT INJECTION
Status: DISCONTINUED | OUTPATIENT
Start: 2025-05-07 | End: 2025-05-10

## 2025-05-07 RX ORDER — ROCURONIUM BROMIDE 10 MG/ML
INJECTION, SOLUTION INTRAVENOUS
Status: DISCONTINUED | OUTPATIENT
Start: 2025-05-07 | End: 2025-05-07

## 2025-05-07 RX ORDER — SODIUM CHLORIDE 0.9 % (FLUSH) 0.9 %
10 SYRINGE (ML) INJECTION
Status: DISCONTINUED | OUTPATIENT
Start: 2025-05-07 | End: 2025-05-07 | Stop reason: HOSPADM

## 2025-05-07 RX ORDER — DEXTROSE MONOHYDRATE AND SODIUM CHLORIDE 5; .45 G/100ML; G/100ML
INJECTION, SOLUTION INTRAVENOUS CONTINUOUS
Status: DISCONTINUED | OUTPATIENT
Start: 2025-05-07 | End: 2025-05-08

## 2025-05-07 RX ORDER — PROPOFOL 10 MG/ML
VIAL (ML) INTRAVENOUS
Status: DISCONTINUED | OUTPATIENT
Start: 2025-05-07 | End: 2025-05-07

## 2025-05-07 RX ORDER — DIPHENHYDRAMINE HCL 25 MG
50 CAPSULE ORAL ONCE AS NEEDED
Status: DISCONTINUED | OUTPATIENT
Start: 2025-05-07 | End: 2025-05-13 | Stop reason: HOSPADM

## 2025-05-07 RX ORDER — HALOPERIDOL LACTATE 5 MG/ML
1 INJECTION, SOLUTION INTRAMUSCULAR EVERY 4 HOURS PRN
OUTPATIENT
Start: 2025-05-07

## 2025-05-07 RX ORDER — ACETAMINOPHEN 325 MG/1
650 TABLET ORAL
Status: COMPLETED | OUTPATIENT
Start: 2025-05-08 | End: 2025-05-08

## 2025-05-07 RX ORDER — ACETAMINOPHEN 325 MG/1
650 TABLET ORAL EVERY 8 HOURS
Status: DISPENSED | OUTPATIENT
Start: 2025-05-07 | End: 2025-05-09

## 2025-05-07 RX ORDER — OXYCODONE HYDROCHLORIDE 5 MG/1
5 TABLET ORAL EVERY 6 HOURS PRN
Status: DISCONTINUED | OUTPATIENT
Start: 2025-05-07 | End: 2025-05-08

## 2025-05-07 RX ORDER — SODIUM CHLORIDE 0.9 % (FLUSH) 0.9 %
10 SYRINGE (ML) INJECTION
Status: DISCONTINUED | OUTPATIENT
Start: 2025-05-07 | End: 2025-05-13 | Stop reason: HOSPADM

## 2025-05-07 RX ORDER — CEFAZOLIN SODIUM 1 G/3ML
INJECTION, POWDER, FOR SOLUTION INTRAMUSCULAR; INTRAVENOUS
Status: DISCONTINUED | OUTPATIENT
Start: 2025-05-07 | End: 2025-05-07 | Stop reason: HOSPADM

## 2025-05-07 RX ORDER — IBUPROFEN 200 MG
24 TABLET ORAL
Status: DISCONTINUED | OUTPATIENT
Start: 2025-05-07 | End: 2025-05-13 | Stop reason: HOSPADM

## 2025-05-07 RX ORDER — EPINEPHRINE 1 MG/ML
1 INJECTION, SOLUTION, CONCENTRATE INTRAVENOUS ONCE AS NEEDED
Status: DISCONTINUED | OUTPATIENT
Start: 2025-05-07 | End: 2025-05-13 | Stop reason: HOSPADM

## 2025-05-07 RX ORDER — ONDANSETRON HYDROCHLORIDE 2 MG/ML
INJECTION, SOLUTION INTRAVENOUS
Status: DISCONTINUED | OUTPATIENT
Start: 2025-05-07 | End: 2025-05-07

## 2025-05-07 RX ORDER — FUROSEMIDE 10 MG/ML
INJECTION INTRAMUSCULAR; INTRAVENOUS
Status: DISCONTINUED | OUTPATIENT
Start: 2025-05-07 | End: 2025-05-07

## 2025-05-07 RX ORDER — MUPIROCIN 20 MG/G
OINTMENT TOPICAL
Status: DISCONTINUED | OUTPATIENT
Start: 2025-05-07 | End: 2025-05-07 | Stop reason: HOSPADM

## 2025-05-07 RX ORDER — PREGABALIN 75 MG/1
75 CAPSULE ORAL
Status: COMPLETED | OUTPATIENT
Start: 2025-05-07 | End: 2025-05-07

## 2025-05-07 RX ORDER — DIPHENHYDRAMINE HCL 25 MG
25 CAPSULE ORAL
Status: COMPLETED | OUTPATIENT
Start: 2025-05-08 | End: 2025-05-08

## 2025-05-07 RX ORDER — METHYLPREDNISOLONE SOD SUCC 125 MG
250 VIAL (EA) INJECTION ONCE
Status: COMPLETED | OUTPATIENT
Start: 2025-05-08 | End: 2025-05-08

## 2025-05-07 RX ORDER — CEFAZOLIN 2 G/1
2 INJECTION, POWDER, FOR SOLUTION INTRAMUSCULAR; INTRAVENOUS
Status: COMPLETED | OUTPATIENT
Start: 2025-05-07 | End: 2025-05-07

## 2025-05-07 RX ORDER — INSULIN ASPART 100 [IU]/ML
0-5 INJECTION, SOLUTION INTRAVENOUS; SUBCUTANEOUS
Status: DISCONTINUED | OUTPATIENT
Start: 2025-05-07 | End: 2025-05-10

## 2025-05-07 RX ORDER — MYCOPHENOLATE MOFETIL 250 MG/1
1000 CAPSULE ORAL 2 TIMES DAILY
Status: DISCONTINUED | OUTPATIENT
Start: 2025-05-07 | End: 2025-05-13 | Stop reason: HOSPADM

## 2025-05-07 RX ORDER — ACETAMINOPHEN 650 MG/20.3ML
650 LIQUID ORAL ONCE
Status: COMPLETED | OUTPATIENT
Start: 2025-05-07 | End: 2025-05-07

## 2025-05-07 RX ORDER — ONDANSETRON HYDROCHLORIDE 2 MG/ML
4 INJECTION, SOLUTION INTRAVENOUS EVERY 6 HOURS PRN
Status: DISCONTINUED | OUTPATIENT
Start: 2025-05-07 | End: 2025-05-13 | Stop reason: HOSPADM

## 2025-05-07 RX ADMIN — HYDROMORPHONE HYDROCHLORIDE 0.2 MG: 1 INJECTION, SOLUTION INTRAMUSCULAR; INTRAVENOUS; SUBCUTANEOUS at 06:05

## 2025-05-07 RX ADMIN — MUPIROCIN 1 G: 20 OINTMENT TOPICAL at 10:05

## 2025-05-07 RX ADMIN — FUROSEMIDE 100 MG: 10 INJECTION, SOLUTION INTRAMUSCULAR; INTRAVENOUS at 04:05

## 2025-05-07 RX ADMIN — FENTANYL CITRATE 100 MCG: 50 INJECTION, SOLUTION INTRAMUSCULAR; INTRAVENOUS at 12:05

## 2025-05-07 RX ADMIN — PREGABALIN 75 MG: 75 CAPSULE ORAL at 10:05

## 2025-05-07 RX ADMIN — FENTANYL CITRATE 25 MCG: 50 INJECTION, SOLUTION INTRAMUSCULAR; INTRAVENOUS at 05:05

## 2025-05-07 RX ADMIN — SODIUM CHLORIDE: 9 INJECTION, SOLUTION INTRAVENOUS at 12:05

## 2025-05-07 RX ADMIN — HYDROMORPHONE HYDROCHLORIDE 0.2 MG: 1 INJECTION, SOLUTION INTRAMUSCULAR; INTRAVENOUS; SUBCUTANEOUS at 07:05

## 2025-05-07 RX ADMIN — SODIUM CHLORIDE, SODIUM GLUCONATE, SODIUM ACETATE, POTASSIUM CHLORIDE, MAGNESIUM CHLORIDE, SODIUM PHOSPHATE, DIBASIC, AND POTASSIUM PHOSPHATE: .53; .5; .37; .037; .03; .012; .00082 INJECTION, SOLUTION INTRAVENOUS at 01:05

## 2025-05-07 RX ADMIN — MIDODRINE HYDROCHLORIDE 5 MG: 5 TABLET ORAL at 10:05

## 2025-05-07 RX ADMIN — ACETAMINOPHEN 650 MG: 325 TABLET ORAL at 10:05

## 2025-05-07 RX ADMIN — SODIUM CHLORIDE 250 ML: 9 INJECTION, SOLUTION INTRAVENOUS at 07:05

## 2025-05-07 RX ADMIN — FAMOTIDINE 20 MG: 20 TABLET, FILM COATED ORAL at 10:05

## 2025-05-07 RX ADMIN — MANNITOL 25 G: 12.5 INJECTION, SOLUTION INTRAVENOUS at 04:05

## 2025-05-07 RX ADMIN — PROPOFOL 150 MG: 10 INJECTION, EMULSION INTRAVENOUS at 12:05

## 2025-05-07 RX ADMIN — ACETAMINOPHEN 650 MG: 650 SOLUTION ORAL at 01:05

## 2025-05-07 RX ADMIN — HEPARIN SODIUM 5000 UNITS: 5000 INJECTION INTRAVENOUS; SUBCUTANEOUS at 02:05

## 2025-05-07 RX ADMIN — CEFAZOLIN 2 G: 2 INJECTION, POWDER, FOR SOLUTION INTRAMUSCULAR; INTRAVENOUS at 02:05

## 2025-05-07 RX ADMIN — PHENYLEPHRINE HYDROCHLORIDE 100 MCG: 10 INJECTION INTRAVENOUS at 03:05

## 2025-05-07 RX ADMIN — HYDROMORPHONE HYDROCHLORIDE 0.2 MG: 1 INJECTION, SOLUTION INTRAMUSCULAR; INTRAVENOUS; SUBCUTANEOUS at 08:05

## 2025-05-07 RX ADMIN — ROCURONIUM BROMIDE 20 MG: 10 INJECTION, SOLUTION INTRAVENOUS at 03:05

## 2025-05-07 RX ADMIN — FENTANYL CITRATE 50 MCG: 50 INJECTION, SOLUTION INTRAMUSCULAR; INTRAVENOUS at 06:05

## 2025-05-07 RX ADMIN — METHYLPREDNISOLONE SODIUM SUCCINATE 500 MG: 500 INJECTION INTRAMUSCULAR; INTRAVENOUS at 01:05

## 2025-05-07 RX ADMIN — ROCURONIUM BROMIDE 50 MG: 10 INJECTION, SOLUTION INTRAVENOUS at 12:05

## 2025-05-07 RX ADMIN — OXYCODONE 5 MG: 5 TABLET ORAL at 09:05

## 2025-05-07 RX ADMIN — SUGAMMADEX 200 MG: 100 INJECTION, SOLUTION INTRAVENOUS at 05:05

## 2025-05-07 RX ADMIN — HEPARIN SODIUM 5000 UNITS: 5000 INJECTION INTRAVENOUS; SUBCUTANEOUS at 10:05

## 2025-05-07 RX ADMIN — ROCURONIUM BROMIDE 50 MG: 10 INJECTION, SOLUTION INTRAVENOUS at 01:05

## 2025-05-07 RX ADMIN — DIPHENHYDRAMINE HYDROCHLORIDE 50 MG: 50 INJECTION, SOLUTION INTRAMUSCULAR; INTRAVENOUS at 01:05

## 2025-05-07 RX ADMIN — ONDANSETRON 4 MG: 2 INJECTION INTRAMUSCULAR; INTRAVENOUS at 05:05

## 2025-05-07 RX ADMIN — PHENYLEPHRINE HYDROCHLORIDE 0.4 MCG/KG/MIN: 10 INJECTION INTRAVENOUS at 03:05

## 2025-05-07 RX ADMIN — MUPIROCIN: 20 OINTMENT TOPICAL at 10:05

## 2025-05-07 RX ADMIN — ROCURONIUM BROMIDE 30 MG: 10 INJECTION, SOLUTION INTRAVENOUS at 04:05

## 2025-05-07 RX ADMIN — MIDAZOLAM HYDROCHLORIDE 2 MG: 2 INJECTION, SOLUTION INTRAMUSCULAR; INTRAVENOUS at 12:05

## 2025-05-07 RX ADMIN — SODIUM CHLORIDE: 9 INJECTION, SOLUTION INTRAVENOUS at 10:05

## 2025-05-07 RX ADMIN — DOCUSATE SODIUM 100 MG: 100 CAPSULE, LIQUID FILLED ORAL at 10:05

## 2025-05-07 RX ADMIN — SODIUM CHLORIDE 125 MG: 9 INJECTION, SOLUTION INTRAVENOUS at 02:05

## 2025-05-07 RX ADMIN — CEFAZOLIN 2 G: 2 INJECTION, POWDER, FOR SOLUTION INTRAMUSCULAR; INTRAVENOUS at 10:05

## 2025-05-07 RX ADMIN — LIDOCAINE HYDROCHLORIDE 100 MG: 20 INJECTION INTRAVENOUS at 12:05

## 2025-05-07 RX ADMIN — MYCOPHENOLATE MOFETIL 1000 MG: 250 CAPSULE ORAL at 10:05

## 2025-05-07 RX ADMIN — BISACODYL 10 MG: 5 TABLET, COATED ORAL at 10:05

## 2025-05-07 RX ADMIN — DEXTROSE AND SODIUM CHLORIDE: 5; 450 INJECTION, SOLUTION INTRAVENOUS at 06:05

## 2025-05-07 RX ADMIN — LIDOCAINE HYDROCHLORIDE 75 MG: 20 INJECTION INTRAVENOUS at 04:05

## 2025-05-07 NOTE — SUBJECTIVE & OBJECTIVE
Interval HPI:   No acute events overnight. Patient in room 44912/45500 A. Blood glucose stable. BG at goal on current insulin regimen (SSI ). Steroid use- None.   * Day of Surgery *  Renal function- Abnormal - Cr 10.3    Vasopressors-  None     Diet NPO Except for: Sips with Medication     Eating:   NPO  Nausea: No  Hypoglycemia and intervention: No  Fever: No  TPN and/or TF: No    PMH, PSH, FH, SH updated and reviewed     ROS:  Review of Systems   Gastrointestinal:  Negative for constipation, diarrhea, nausea and vomiting.   Endocrine: Negative for polydipsia.       Current Medications and/or Treatments Impacting Glycemic Control  Immunotherapy:    Immunosuppressants           Stop Route Frequency     antithymocyte globulin (rabbit) 125 mg, hydrocortisone sodium succinate (SOLU-CORTEF) 20 mg in 0.9% NaCl 500 mL (FOR PERIPHERAL LINE ADMINISTRATION ONLY)         -- IV Once          Steroids:   Hormones (From admission, onward)      Start     Stop Route Frequency Ordered    05/07/25 0245  antithymocyte globulin (rabbit) 125 mg, hydrocortisone sodium succinate (SOLU-CORTEF) 20 mg in 0.9% NaCl 500 mL (FOR PERIPHERAL LINE ADMINISTRATION ONLY)  (IP TXP INTRA-OP THYMO - PERIPHERAL THYMO PRECHECKED KIDNEY)         -- IV Once 05/07/25 0131    05/07/25 0145  methylPREDNISolone sodium succinate (SOLU-MEDROL) 500 mg in 0.9% NaCl 100 mL IVPB  (IP TXP INTRA-OP THYMO - PERIPHERAL THYMO PRECHECKED KIDNEY)         -- IV Once 05/07/25 0131          Pressors:    Autonomic Drugs (From admission, onward)      None          Hyperglycemia/Diabetes Medications:   Antihyperglycemics (From admission, onward)      None             PHYSICAL EXAMINATION:  Vitals:    05/07/25 0721   BP: 103/67   Pulse: 68   Resp: 18   Temp: 97.8 °F (36.6 °C)     Body mass index is 26.61 kg/m².     Physical Exam  Constitutional:       General: She is not in acute distress.     Appearance: Normal appearance. She is not ill-appearing.   HENT:      Head:  Normocephalic and atraumatic.      Right Ear: External ear normal.      Left Ear: External ear normal.      Nose: Nose normal.   Pulmonary:      Effort: No respiratory distress.   Skin:     Coloration: Skin is not jaundiced.   Neurological:      Mental Status: She is alert. Mental status is at baseline.   Psychiatric:         Mood and Affect: Mood normal.         Behavior: Behavior normal.         Thought Content: Thought content normal.         Judgment: Judgment normal.

## 2025-05-07 NOTE — NURSING
Patient transferred to [facility] via wheelchair accompanied by staff.  All personal belongings left in room.  No undergarments, contacts, dentures or jewelry present.  Chart with patient

## 2025-05-07 NOTE — ANESTHESIA PREPROCEDURE EVALUATION
[facility]  Anesthesia Pre-Operative Evaluation         Patient Name: Anna Gabriel  YOB: 2000  MRN: [id number]    SUBJECTIVE:     Pre-operative evaluation for Procedure(s) (LRB):  TRANSPLANT, KIDNEY (N/A)     05/07/2025    Anna Gabriel is a 24 y.o. female w/ a significant PMHx of seizures, hx PRES in setting of HTN emergency, anuric, ESRD 2/2 FSGS, 2 prior kidney transplants (2015 [facility] 2020 [facility]). On peritoneal dialysis, still makes urine.    Patient now presents for the above procedure(s).      LDA: None documented.    Prev airway: None documented.    Drips: None documented.    Problem List[1]    Review of patient's allergies indicates:  No Known Allergies    Current Outpatient Medications:  Current Medications[2]    Past Surgical History:   Procedure Laterality Date    BONE MARROW BIOPSY      KIDNEY TRANSPLANT      NEPHRECTOMY      Transplant Kidney    PERITONEAL CATHETER REMOVAL         Social History[3]    OBJECTIVE:     Vital Signs Range (Last 24H):  Temp:  [36.5 °C (97.7 °F)-36.9 °C (98.4 °F)]   Pulse:  [73-89]   Resp:  [18]   BP: (106-120)/(75-83)   SpO2:  [96 %-97 %]       Significant Labs:  Lab Results   Component Value Date    WBC 7.71 05/07/2025    HGB 9.3 (L) 05/07/2025    HCT 30.4 (L) 05/07/2025     05/07/2025    CHOL 195 03/28/2024    TRIG 172 (H) 03/28/2024    HDL 38 (L) 03/28/2024    ALT 16 05/07/2025    AST 16 05/07/2025     05/07/2025    K 3.8 05/07/2025    CL 98 05/07/2025    CREATININE 10.3 (H) 05/07/2025    BUN 45 (H) 05/07/2025    CO2 26 05/07/2025    TSH 3.390 08/08/2016    INR 1.0 05/07/2025    HGBA1C 4.3 04/09/2025       Diagnostic Studies: No relevant studies.    EKG:   No results found for this or any previous visit.    2D ECHO:  TTE:  No results found for this or any previous visit.    AKIRA:  No results found for this or any previous visit.    ASSESSMENT/PLAN:           Pre-op Assessment    I have reviewed the Patient Summary  Reports.     I have reviewed the Nursing Notes. I have reviewed the NPO Status.   I have reviewed the Medications.     Review of Systems  Anesthesia Hx:  No problems with previous Anesthesia   History of prior surgery of interest to airway management or planning:          Denies Family Hx of Anesthesia complications.    Denies Personal Hx of Anesthesia complications.                    Hematology/Oncology:    Oncology Normal                                   Cardiovascular:     Hypertension                                          Pulmonary:  Pulmonary Normal                       Renal/:  Chronic Renal Disease, renal hypertension, ESRD, Dialysis   SP kidney transplant x2  Peritoneal dialysis             Musculoskeletal:  Musculoskeletal Normal                Neurological:  Neurology Normal                                      Endocrine:    Hyperthyroidism         Psych:  Psychiatric Normal                    Physical Exam  General: Well nourished, Cooperative, Alert and Oriented    Airway:  Mallampati: II   Mouth Opening: Normal  TM Distance: Normal  Neck ROM: Normal ROM    Dental:  Intact    Chest/Lungs:  Normal Respiratory Rate, Clear to auscultation    Heart:  Rate: Normal  Rhythm: Regular Rhythm        Anesthesia Plan  Type of Anesthesia, risks & benefits discussed:    Anesthesia Type: Gen ETT  Intra-op Monitoring Plan: Standard ASA Monitors, Art Line and Central Line  Post Op Pain Control Plan: multimodal analgesia and IV/PO Opioids PRN  Induction:  IV  Airway Plan: Direct, Post-Induction  ASA Score: 4  Day of Surgery Review of History & Physical: H&P Update referred to the surgeon/provider.    Ready For Surgery From Anesthesia Perspective.     .           [1]   Patient Active Problem List  Diagnosis    Benign hypertension with ESRD (end-stage renal disease)    C. difficile colitis    UTI (urinary tract infection)    Gastritis    Chronic rejection of renal transplant    FSGS (focal segmental  glomerulosclerosis)    Anemia in chronic kidney disease, on chronic dialysis    Megacolon due to infectious colitis    Non compliance with medical treatment    Failed kidney transplant x2    Hyperparathyroidism due to ESRD on dialysis    Hypoalbuminemia   [2]   Current Facility-Administered Medications:     acetaminophen oral solution 650 mg, 650 mg, Per NG tube, Once, Radha Loomis PA-C    antithymocyte globulin (rabbit) 125 mg, hydrocortisone sodium succinate (SOLU-CORTEF) 20 mg in 0.9% NaCl 500 mL (FOR PERIPHERAL LINE ADMINISTRATION ONLY), 2.25 mg/kg (Ideal), Intravenous, Once, Radha Loomis PA-C    ceFAZolin 2 g, 2 g, Intravenous, On Call Procedure, Radha Loomis PA-C    diphenhydrAMINE injection 50 mg, 50 mg, Intravenous, Once, Radha Loomis PA-C    methylPREDNISolone sodium succinate (SOLU-MEDROL) 500 mg in 0.9% NaCl 100 mL IVPB, 500 mg, Intravenous, Once, Radha Loomis PA-C    mupirocin 2 % ointment, , Nasal, On Call Procedure, Radha Loomis PA-C    pregabalin capsule 75 mg, 75 mg, Oral, On Call Procedure, Radha Loomis PA-C    sodium chloride 0.9% flush 10 mL, 10 mL, Intravenous, PRN, Radha Loomis PA-C  [3]   Social History  Socioeconomic History    Marital status: Single   Tobacco Use    Smoking status: Never   Substance and Sexual Activity    Alcohol use: No     Alcohol/week: 0.0 standard drinks of alcohol    Drug use: Never    Sexual activity: Never   Social History Narrative    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 Francesca     Social Drivers of Health     Financial Resource Strain: Low Risk  (5/7/2025)    Overall Financial Resource Strain (CARDIA)     Difficulty of Paying Living Expenses: Not hard at all   Food Insecurity: No Food Insecurity (5/7/2025)    Hunger Vital Sign     Worried About Running Out of Food in the Last Year: Never true     Ran Out of Food in the Last Year: Never true   Transportation Needs: No Transportation  Needs (5/7/2025)    PRAPARE - Transportation     Lack of Transportation (Medical): No     Lack of Transportation (Non-Medical): No   Physical Activity: Sufficiently Active (3/24/2025)    Exercise Vital Sign     Days of Exercise per Week: 3 days     Minutes of Exercise per Session: 60 min   Stress: No Stress Concern Present (5/7/2025)    [employer] [place] [employer] - Occupational Stress Questionnaire     Feeling of Stress : Not at all   Recent Concern: Stress - Stress Concern Present (3/24/2025)    [employer] [place] [employer] - Occupational Stress Questionnaire     Feeling of Stress : To some extent   Housing Stability: Low Risk  (5/7/2025)    Housing Stability Vital Sign     Unable to Pay for Housing in the Last Year: No     Homeless in the Last Year: No

## 2025-05-07 NOTE — SUBJECTIVE & OBJECTIVE
Subjective:     Chief Complaint/Reason for Admission: ESRD     History of Present Illness:  Ms. Gabriel is a 24 y.o. White female who has ESRD 2/2 FSGS. She has had 2 previous kidney transplants (~ 2015 at [facility], 12/2020 at [place], [place]). 1st kidney lost to hyper acute rejection and 2nd kidney due to infection. She is on PD since 2021, tolerating well, denies hx peritonitis. PMH also includes: seizure and hx PRES in setting of HTN emergency, decreased bladder capacity seen by urology. Anuric. She presents as a primary candidate for a kidney transplant. Reports being in her usual state of health, denies recent illnesses or hospitalizations. Per urology, needs oxybutynin POD1 and repeat urodynamics outpatient once bladder enlarges again to assess for hostile bladder at larger volumes. Surgery scheduled for 1100 with Dr. Stern. Pre op labs and imagign pending, will be reviewed prior to transplant     Dialysis History: Ms. Castillo with ESRD, requiring chronic dialysis who is on peritoneal dialysis started on 2021. Patient is dialyzing on cyclic peritoneal dialysis.  Patient reports that she is tolerating dialysis well.   Date of Last Dialysis: 5/6/25    Native urine output per day: 0 mL    Previous Transplant: yes; organ: kidney, date: 2015, 2020, complications: rejection, infection (pyelo).    No medications prior to admission.       Review of patient's allergies indicates:  No Known Allergies    Past Medical History:   Diagnosis Date    Anemia     Encounter for blood transfusion     FSGS (focal segmental glomerulosclerosis)     Hypertension     Hyperthyroidism     Kidney transplant status     Kidney Rejection transplant kidney     Past Surgical History:   Procedure Laterality Date    BONE MARROW BIOPSY      KIDNEY TRANSPLANT      NEPHRECTOMY      Transplant Kidney    PERITONEAL CATHETER REMOVAL       Family History       Problem Relation (Age of Onset)    Arrhythmia Father    Asthma Maternal Grandmother    Diabetes  Father, Maternal Grandmother    Heart disease Maternal Grandmother          Tobacco Use    Smoking status: Never    Smokeless tobacco: Not on file   Substance and Sexual Activity    Alcohol use: No     Alcohol/week: 0.0 standard drinks of alcohol    Drug use: Never    Sexual activity: Never        Review of Systems   Constitutional:  Negative for activity change, appetite change and fever.   Respiratory:  Negative for cough and shortness of breath.    Cardiovascular:  Negative for chest pain and leg swelling.   Gastrointestinal:  Negative for abdominal distention, abdominal pain, diarrhea, nausea and vomiting.   Genitourinary:  Positive for decreased urine volume (anuric).   Skin:  Negative for wound.   Allergic/Immunologic: Positive for immunocompromised state.   Neurological:  Negative for dizziness and weakness.     Objective:     Vital Signs (Most Recent):           There is no height or weight on file to calculate BMI.      Physical Exam  Vitals and nursing note reviewed.   Constitutional:       General: She is not in acute distress.  HENT:      Head: Normocephalic and atraumatic.      Mouth/Throat:      Mouth: Mucous membranes are moist.   Eyes:      Extraocular Movements: Extraocular movements intact.      Conjunctiva/sclera: Conjunctivae normal.   Cardiovascular:      Rate and Rhythm: Normal rate and regular rhythm.      Pulses: Normal pulses.      Heart sounds: Normal heart sounds.   Pulmonary:      Effort: Pulmonary effort is normal. No respiratory distress.      Breath sounds: Normal breath sounds.   Abdominal:      General: Bowel sounds are normal. There is no distension.      Palpations: Abdomen is soft.      Tenderness: There is no abdominal tenderness. There is no guarding or rebound.      Comments: Multiple well healed abd scars   Musculoskeletal:         General: No swelling. Normal range of motion.      Cervical back: Normal range of motion.   Skin:     General: Skin is warm and dry.    Neurological:      Mental Status: She is alert and oriented to person, place, and time.      Motor: No weakness.   Psychiatric:         Mood and Affect: Mood normal.         Behavior: Behavior normal.         Thought Content: Thought content normal.         Judgment: Judgment normal.          Laboratory  Pending     Diagnostic Results:  Pending

## 2025-05-07 NOTE — CONSULTS
Patient taken to OR for transplant prior to rounds this AM. Spoke with family member regarding plan for BG monitoring and insulin coverage as needed, as well as possible need for scheduled insulin if consistent BG excursions secondary to steroids noted. Will see pt after tx.       HPI:   Patient is a 24 y.o. female who has ESRD 2/2 FSGS. She has had 2 previous kidney transplants (~ 2015 at [facility], 12/2020 at [facility], [place]). 1st kidney lost to hyper acute rejection and 2nd kidney due to infection. She is on PD since 2021, tolerating well, denies hx peritonitis. PMH also includes: seizure and hx PRES in setting of HTN emergency, decreased bladder capacity seen by urology. Anuric. She presents as a primary candidate for a kidney transplant. Endocrine consulted for post-operative BG management.       No hx of DM, no hx of steroid-induced hyperglycemia after previous kidney transplants.     Plan:  BG goal: 140-180    - LDC SSI PRN (150/50)   - POCT Glucose ac/hs/0200   - Hypoglycemia protocol in place      ** Please notify Endocrine for any change and/or advance in diet**  ** Please call Endocrine for any BG related issues **     Discharge Planning:   TBD. Please notify endocrinology prior to discharge.

## 2025-05-07 NOTE — TELEPHONE ENCOUNTER
"ON CALL NOTE     UNOS# [id number]    5/6/2025    08:35 Report received from Maylin Hess RN    19:30 Received update from [facility] that we accepted left kidney and this patient became a primary with instructions to admit now.  Recipient OR time 11 am on 5/7/2025.  NPO starting tonight at midnight.  Patient was informed by phone to start driving now. ETA is around midnight.  All admit personnel notified. [facility] # [id number]  Patient was instructed to call me once she arrives at [facility]. She VU.    20:40 A phone call to confirm a back-up caregiver plan per committee's decision from 4/4/2025: Pt's mother remains her primary caregiver and is going to be with her during the transplant hospitalization. Patient reported her brother and uncle will be her back-up caregivers as previously decided. Edouard Blanco, 26 brother, [phone] and Beni Estevez, 44, uncle, [phone]. No change.  Patient also informed me she had additional medical insurance coverage available. The email is being sent to financial coordinators to add additional coverage. Per patient her previous insurance is still current. No change to previous coverage.    5/7/2025  01:05  A phone call from patient letting me know she made it to [facility].    08:15  Waldemar () notifying by email: "I received and verified [employer] coverage, Medicare is primary per RTE and [employer] portal.     Patient is clear to proceed as scheduled."    08:16  Report given to Ira Tellez RN    "

## 2025-05-07 NOTE — TRANSFER OF CARE
"Anesthesia Transfer of Care Note    Patient: Anna Gabriel    Procedure(s) Performed: Procedure(s) (LRB):  TRANSPLANT, KIDNEY (N/A)    Patient location: PACU    Anesthesia Type: general    Transport from OR: Transported from OR on 6-10 L/min O2 by face mask with adequate spontaneous ventilation    Post pain: adequate analgesia    Post assessment: no apparent anesthetic complications and tolerated procedure well    Post vital signs: stable    Level of consciousness: lethargic and responds to stimulation    Nausea/Vomiting: no nausea/vomiting    Complications: none    Transfer of care protocol was followed    Last vitals: Visit Vitals  /60 (Patient Position: Lying)   Pulse 63   Temp 36.5 °C (97.7 °F) (Temporal)   Resp 14   Ht 5' 2" (1.575 m)   Wt 59 kg (130 lb)   LMP  (LMP Unknown)   SpO2 98%   Breastfeeding No   BMI 23.78 kg/m²     "

## 2025-05-07 NOTE — ANESTHESIA PROCEDURE NOTES
Arterial    Diagnosis: ESRD    Patient location during procedure: done in OR  Procedure end time: 5/7/2025 12:56 PM    Staffing  Authorizing Provider: Austin Otero Jr., MD  Performing Provider: Austin Otero Jr., MD    Staffing  Performed by: Austin Otero Jr., MD  Authorized by: Austin Otero Jr., MD    Anesthesiologist was present at the time of the procedure.    Preanesthetic Checklist  Completed: patient identified, IV checked, site marked, risks and benefits discussed, surgical consent, monitors and equipment checked, pre-op evaluation, timeout performed and anesthesia consent givenArterial  Skin Prep: chlorhexidine gluconate  Local Infiltration: none  Orientation: left  Location: radial    Catheter Size: 4 Fr Cook  Catheter placement by Anatomical landmarks. Heme positive aspiration all ports. Insertion Attempts: 1  Assessment  Dressing: secured with tape and tegaderm  Patient: Tolerated well

## 2025-05-07 NOTE — PLAN OF CARE
Patient off unit for kidney transplant.  Return to TSU [id number] after PACU recovery period is complete.  Left with LLQ peritoneal dialysis catheter & LAC 22g PIV.  Monitor on return

## 2025-05-07 NOTE — CARE UPDATE
Patient taken to OR for transplant prior to rounds this AM. Spoke with family member regarding plan for BG monitoring and insulin coverage as needed, as well as possible need for scheduled insulin if consistent BG excursions secondary to steroids noted. Will see pt after tx.       HPI:   Patient is a 24 y.o. female who has ESRD 2/2 FSGS. She has had 2 previous kidney transplants (~ 2015 at [facility], 12/2020 at [facility], [place]). 1st kidney lost to hyper acute rejection and 2nd kidney due to infection. She is on PD since 2021, tolerating well, denies hx peritonitis. PMH also includes: seizure and hx PRES in setting of HTN emergency, decreased bladder capacity seen by urology. Anuric. She presents as a primary candidate for a kidney transplant. Endocrine consulted for post-operative BG management.       No hx of DM, no hx of steroid-induced hyperglycemia after previous kidney transplants.     Plan:  BG goal: 140-180    - LDC SSI PRN (150/50)   - POCT Glucose ac/hs/0200   - Hypoglycemia protocol in place      ** Please notify Endocrine for any change and/or advance in diet**  ** Please call Endocrine for any BG related issues **     Discharge Planning:   TBD. Please notify endocrinology prior to discharge.

## 2025-05-07 NOTE — ASSESSMENT & PLAN NOTE
BG goal: 140-180    - LDC SSI PRN (150/50)   - POCT Glucose every 4 hours  - Hypoglycemia protocol in place      ** Please notify Endocrine for any change and/or advance in diet**  ** Please call Endocrine for any BG related issues **     Discharge Planning:   TBD. Please notify endocrinology prior to discharge.

## 2025-05-07 NOTE — ASSESSMENT & PLAN NOTE
- w history of PRESS 2/2 hypertensive emergency   - monitor closely, add anti-hypertensive agents as needed

## 2025-05-07 NOTE — CONSULTS
[facility]  Endocrinology  Diabetes Consult Note    Consult Requested by: Jeff Stern MD   Reason for admit: FSGS (focal segmental glomerulosclerosis)    HISTORY OF PRESENT ILLNESS:  Reason for Consult: Management of Hyperglycemia     Surgical Procedure and Date: Tentative kidney tx 5/7/25    Diabetes diagnosis year: No hx of DM     HPI:   Patient is a 24 y.o. female who has ESRD 2/2 FSGS. She has had 2 previous kidney transplants (~ 2015 at [facility], 12/2020 at [facility], [place]). 1st kidney lost to hyper acute rejection and 2nd kidney due to infection. She is on PD since 2021, tolerating well, denies hx peritonitis. PMH also includes: seizure and hx PRES in setting of HTN emergency, decreased bladder capacity seen by urology. Anuric. She presents as a primary candidate for a kidney transplant. Endocrine consulted for post-operative BG management.         Interval HPI:   No acute events overnight. Patient in room 71482/47757 A. Blood glucose stable. BG at goal on current insulin regimen (SSI ). Steroid use- None.   * Day of Surgery *  Renal function- Abnormal - Cr 10.3    Vasopressors-  None     Diet NPO Except for: Sips with Medication     Eating:   NPO  Nausea: No  Hypoglycemia and intervention: No  Fever: No  TPN and/or TF: No    PMH, PSH, FH, SH updated and reviewed     ROS:  Review of Systems   Gastrointestinal:  Negative for constipation, diarrhea, nausea and vomiting.   Endocrine: Negative for polydipsia.       Current Medications and/or Treatments Impacting Glycemic Control  Immunotherapy:    Immunosuppressants           Stop Route Frequency     antithymocyte globulin (rabbit) 125 mg, hydrocortisone sodium succinate (SOLU-CORTEF) 20 mg in 0.9% NaCl 500 mL (FOR PERIPHERAL LINE ADMINISTRATION ONLY)         -- IV Once          Steroids:   Hormones (From admission, onward)      Start     Stop Route Frequency Ordered    05/07/25 0245  antithymocyte globulin (rabbit) 125 mg, hydrocortisone  "sodium succinate (SOLU-CORTEF) 20 mg in 0.9% NaCl 500 mL (FOR PERIPHERAL LINE ADMINISTRATION ONLY)  (IP TXP INTRA-OP THYMO - PERIPHERAL THYMO PRECHECKED KIDNEY)         -- IV Once 05/07/25 0131    05/07/25 0145  methylPREDNISolone sodium succinate (SOLU-MEDROL) 500 mg in 0.9% NaCl 100 mL IVPB  (IP TXP INTRA-OP THYMO - PERIPHERAL THYMO PRECHECKED KIDNEY)         -- IV Once 05/07/25 0131          Pressors:    Autonomic Drugs (From admission, onward)      None          Hyperglycemia/Diabetes Medications:   Antihyperglycemics (From admission, onward)      None             PHYSICAL EXAMINATION:  Vitals:    05/07/25 0721   BP: 103/67   Pulse: 68   Resp: 18   Temp: 97.8 °F (36.6 °C)     Body mass index is 26.61 kg/m².     Physical Exam  Constitutional:       General: She is not in acute distress.     Appearance: Normal appearance. She is not ill-appearing.   HENT:      Head: Normocephalic and atraumatic.      Right Ear: External ear normal.      Left Ear: External ear normal.      Nose: Nose normal.   Pulmonary:      Effort: No respiratory distress.   Skin:     Coloration: Skin is not jaundiced.   Neurological:      Mental Status: She is alert. Mental status is at baseline.   Psychiatric:         Mood and Affect: Mood normal.         Behavior: Behavior normal.         Thought Content: Thought content normal.         Judgment: Judgment normal.            Labs Reviewed and Include   Recent Labs   Lab 05/07/25  0430   GLU 93   CALCIUM 7.9*   ALBUMIN 2.5*   PROT 5.7*      K 3.8   CO2 26   CL 98   BUN 45*   CREATININE 10.3*   ALKPHOS 189*   ALT 16   AST 16   BILITOT 0.4     Lab Results   Component Value Date    WBC 7.71 05/07/2025    HGB 9.3 (L) 05/07/2025    HCT 30.4 (L) 05/07/2025     (H) 05/07/2025     05/07/2025     No results for input(s): "TSH", "FREET4" in the last 168 hours.  Lab Results   Component Value Date    HGBA1C 4.3 04/09/2025       Nutritional status:   Body mass index is 26.61 kg/m².  Lab " "Results   Component Value Date    ALBUMIN 2.5 (L) 05/07/2025    ALBUMIN 2.9 (L) 03/28/2024    ALBUMIN 1.9 (L) 09/09/2017     No results found for: "PREALBUMIN"    Estimated Creatinine Clearance: 7.5 mL/min (A) (based on SCr of 10.3 mg/dL (H)).    Accu-Checks  Recent Labs     05/07/25  0252   POCTGLUCOSE 90        ASSESSMENT and PLAN    Renal/  * FSGS (focal segmental glomerulosclerosis)  Managed per primary team  Tentative kidney tx       Endocrine  Hyperglycemia  BG goal: 140-180    - LDC SSI PRN (150/50)   - POCT Glucose every 4 hours  - Hypoglycemia protocol in place      ** Please notify Endocrine for any change and/or advance in diet**  ** Please call Endocrine for any BG related issues **     Discharge Planning:   TBD. Please notify endocrinology prior to discharge.        Other  Adrenal cortical steroids causing adverse effect in therapeutic use  Glucocorticoids markedly increase glucose levels. Expect the steroid taper will help glucose control.             Plan discussed with patient, family, and RN at bedside.     Cecilia Vazquez PA-C  Endocrinology  [facility]  "

## 2025-05-07 NOTE — H&P
[facility]  Kidney Transplant  H&P      Subjective:     Chief Complaint/Reason for Admission: ESRD     History of Present Illness:  Ms. Gabriel is a 24 y.o. White female who has ESRD 2/2 FSGS. She has had 2 previous kidney transplants (~ 2015 at [facility], 12/2020 at [facility], [place]). 1st kidney lost to hyper acute rejection and 2nd kidney due to infection. She is on PD since 2021, tolerating well, denies hx peritonitis. PMH also includes: seizure and hx PRES in setting of HTN emergency, decreased bladder capacity seen by urology. Anuric. She presents as a primary candidate for a kidney transplant. Reports being in her usual state of health, denies recent illnesses or hospitalizations. Per urology, needs oxybutynin POD1 and repeat urodynamics outpatient once bladder enlarges again to assess for hostile bladder at larger volumes. Surgery scheduled for 1100 with Dr. Stern. Pre op labs and imagign pending, will be reviewed prior to transplant     Dialysis History: Ms. Castillo with ESRD, requiring chronic dialysis who is on peritoneal dialysis started on 2021. Patient is dialyzing on cyclic peritoneal dialysis.  Patient reports that she is tolerating dialysis well.   Date of Last Dialysis: 5/6/25    Native urine output per day: 0 mL    Previous Transplant: yes; organ: kidney, date: 2015, 2020, complications: rejection, infection (pyelo).    No medications prior to admission.       Review of patient's allergies indicates:  No Known Allergies    Past Medical History:   Diagnosis Date    Anemia     Encounter for blood transfusion     FSGS (focal segmental glomerulosclerosis)     Hypertension     Hyperthyroidism     Kidney transplant status     Kidney Rejection transplant kidney     Past Surgical History:   Procedure Laterality Date    BONE MARROW BIOPSY      KIDNEY TRANSPLANT      NEPHRECTOMY      Transplant Kidney    PERITONEAL CATHETER REMOVAL       Family History       Problem Relation (Age of Onset)     Arrhythmia Father    Asthma Maternal Grandmother    Diabetes Father, Maternal Grandmother    Heart disease Maternal Grandmother          Tobacco Use    Smoking status: Never    Smokeless tobacco: Not on file   Substance and Sexual Activity    Alcohol use: No     Alcohol/week: 0.0 standard drinks of alcohol    Drug use: Never    Sexual activity: Never        Review of Systems   Constitutional:  Negative for activity change, appetite change and fever.   Respiratory:  Negative for cough and shortness of breath.    Cardiovascular:  Negative for chest pain and leg swelling.   Gastrointestinal:  Negative for abdominal distention, abdominal pain, diarrhea, nausea and vomiting.   Genitourinary:  Positive for decreased urine volume (anuric).   Skin:  Negative for wound.   Allergic/Immunologic: Positive for immunocompromised state.   Neurological:  Negative for dizziness and weakness.     Objective:     Vital Signs (Most Recent):           There is no height or weight on file to calculate BMI.      Physical Exam  Vitals and nursing note reviewed.   Constitutional:       General: She is not in acute distress.  HENT:      Head: Normocephalic and atraumatic.      Mouth/Throat:      Mouth: Mucous membranes are moist.   Eyes:      Extraocular Movements: Extraocular movements intact.      Conjunctiva/sclera: Conjunctivae normal.   Cardiovascular:      Rate and Rhythm: Normal rate and regular rhythm.      Pulses: Normal pulses.      Heart sounds: Normal heart sounds.   Pulmonary:      Effort: Pulmonary effort is normal. No respiratory distress.      Breath sounds: Normal breath sounds.   Abdominal:      General: Bowel sounds are normal. There is no distension.      Palpations: Abdomen is soft.      Tenderness: There is no abdominal tenderness. There is no guarding or rebound.      Comments: Multiple well healed abd scars   Musculoskeletal:         General: No swelling. Normal range of motion.      Cervical back: Normal range of  motion.   Skin:     General: Skin is warm and dry.   Neurological:      Mental Status: She is alert and oriented to person, place, and time.      Motor: No weakness.   Psychiatric:         Mood and Affect: Mood normal.         Behavior: Behavior normal.         Thought Content: Thought content normal.         Judgment: Judgment normal.          Laboratory  Pending     Diagnostic Results:  Pending       Assessment/Plan:     Cardiac/Vascular  Benign hypertension with ESRD (end-stage renal disease)  - w history of PRESS 2/2 hypertensive emergency   - monitor closely, add anti-hypertensive agents as needed      Renal/  * FSGS (focal segmental glomerulosclerosis)  - primary for KTX w Dr. Stern at 1100   - pre op labs and imaging pending, will eb reviewed prior to transplant       Failed kidney transplant x2        Oncology  Anemia in chronic kidney disease, on chronic dialysis  - h/h stable   - monitor with CBC           The patient presents for kidney transplant.  There are no apparent contraindications to proceeding with the planned transplant.  The patient understands that the transplant could potentially be cancelled pending detailed assessment of the donor organ.  She will receive Thymoglobulin induction.  A complete discussion of the transplant procedure, including risks, complications, and alternatives, as well as any donor-specific risk factors requiring specific disclosure, will be carried out by the responsible staff surgeon prior to the procedure.        Medical decision making for this encounter includes review of pertinent labs and diagnostic studies, assessment and planning, discussions with consulting providers, discussion with patient/family, and participation in multidisciplinary rounds. Time spent caring for patient: 30 minutes    Radha Loomis PA-C  Kidney Transplant  [facility]

## 2025-05-07 NOTE — ANESTHESIA PROCEDURE NOTES
Central Line    Diagnosis: ESRD  Patient location during procedure: done in OR  Procedure Urgency: Emergent  Procedure start time: 5/7/2025 1:01 PM  Timeout: 5/7/2025 1:00 PM  Procedure end time: 5/7/2025 2:00 PM      Staffing  Authorizing Provider: Austin Otero Jr., MD  Performing Provider: Austin Otero Jr., MD    Staffing  Performed by: Austin Otero Jr., MD  Authorized by: Austin Otero Jr., MD    Anesthesiologist was present at the time of the procedure.  Preanesthetic Checklist  Completed: patient identified, IV checked, site marked, risks and benefits discussed, surgical consent, monitors and equipment checked, pre-op evaluation, timeout performed and anesthesia consent given  Indication   Indication: hemodialysis, vascular access     Anesthesia   general anesthesia    Central Line   Skin Prep: skin prepped with ChloraPrep, skin prep agent completely dried prior to procedure  Sterile Barriers Followed: Yes    All five maximal barriers used- gloves, gown, cap, mask, and large sterile sheet    hand hygiene performed prior to central venous catheter insertion  Location: left internal jugular.   Catheter type: triple lumen (Trialysis)  Catheter Size: 12 Fr (19Hx82Kh)  Ultrasound: vascular probe with ultrasound   Vessel Caliber: small, patent  Vascular Doppler:  not done, compressibility normal  Needle advanced into vessel with real time Ultrasound guidance.  Guidewire confirmed in vessel.  sterile gel and probe cover used in ultrasound-guided central venous catheter insertion   Manometry: Venous cannualation confirmed by visual estimation of blood vessel pressure using manometry.  Insertion Attempts: 3   Securement:line sutured, chlorhexidine patch, sterile dressing applied and blood return through all ports    Post-Procedure   X-Ray: placement verified by x-ray (Done under Flouro)   Adverse Events:none      Guidewire Guidewire removed intact.   Additional Notes  LIJ visible with ultrasound.  Small but appeared patent. RIJ site with multiple scars over site. Multiple wires would not pass on left. Probably central stenosis? Hi torque 0.014 wire passed under flouro.

## 2025-05-07 NOTE — Clinical Note
An airway assessment has been completed by MD. Health Maintenance Due   Topic Date Due   • COVID-19 Vaccine (1) Never done   • Pneumococcal Vaccine 0-64 (1 - PCV) 09/21/2009   • Influenza Vaccine (1) 09/01/2022   • Depression Screening  11/03/2022       Patient is due for topics as listed above but is not proceeding with Immunization(s) COVID-19 and Influenza at this time. Depression screening completed. Will discuss other topics with MD. Patient does state she had influenza about a week or so ago.

## 2025-05-07 NOTE — HPI
Reason for Consult: Management of Hyperglycemia     Surgical Procedure and Date: Tentative kidney tx 5/7/25    Diabetes diagnosis year: No hx of DM     HPI:   Patient is a 24 y.o. female who has ESRD 2/2 FSGS. She has had 2 previous kidney transplants (~ 2015 at [facility], 12/2020 at [facility], [place]). 1st kidney lost to hyper acute rejection and 2nd kidney due to infection. She is on PD since 2021, tolerating well, denies hx peritonitis. PMH also includes: seizure and hx PRES in setting of HTN emergency, decreased bladder capacity seen by urology. Anuric. She presents as a primary candidate for a kidney transplant. Endocrine consulted for post-operative BG management.

## 2025-05-07 NOTE — PLAN OF CARE
Preop questions answered. Instructed pt to call with any questions. Call light within reach. Family at bedside. Projected surgery start time given to pt. Pt. Verbalized understanding.

## 2025-05-07 NOTE — NURSING
The patient arrived to the unit at approximately 0115. Aaox4, vss, on room air. Ambulated to room without assistance. Provider, lab, EKG, xray, anesthesia notified of arrival. POCT BG 90. 22 G placed in the left ac. Oriented to room and call light. Nonskid socks. Mom is at the bedside. Bed is in the lowest position with wheels locked and call light in reach.

## 2025-05-07 NOTE — HPI
Ms. Gabriel is a 24 y.o. White female who has ESRD 2/2 FSGS. She has had 2 previous kidney transplants (~ 2015 at [facility], 12/2020 at [facility], [place]). 1st kidney lost to hyper acute rejection and 2nd kidney due to infection. She is on PD since 2021, tolerating well, denies hx peritonitis. PMH also includes: seizure and hx PRES in setting of HTN emergency, decreased bladder capacity seen by urology. Anuric. She presents as a primary candidate for a kidney transplant. Reports being in her usual state of health, denies recent illnesses or hospitalizations. Per urology, needs oxybutynin POD1 and repeat urodynamics outpatient once bladder enlarges again to assess for hostile bladder at larger volumes. Surgery scheduled for 1100 with Dr. Stern. Pre op labs and imaging pending, will be reviewed prior to transplant

## 2025-05-07 NOTE — OP NOTE
Operative Report    Date of Procedure: 5/7/2025  Date of Transplant (UNOS): 5/7/25    Surgeons:  Surgeons and Role:     * Jeff Stern MD - Primary     * Xu Patel MD - Resident - Assisting     * Glory Rivera MD - Fellow    First Assistant Attestation:  The indicated resident served as first assistant for this procedure.    Pre-operative Diagnosis: ESRD, requiring chronic dialysis secondary to Other, Specify - Retransplant  Post-operative Diagnosis: Same    Procedure(s) Performed:   Back Table Preparation of Left Kidney     Donation after Brain Death Kidney transplant  Removal of peritoneal dialysis catheter    Anesthesia: General endotracheal    Preamble  Indications and Patient Counseling: The patient is a 24 y.o. year-old female with end-stage kidney disease secondary to Other, Specify - Retransplant who has been evaluated for a kidney transplant.  The procedure was thoroughly discussed with the patient, including potential risks, complications, and alternatives.  Specific complications mentioned included death, graft non-function, bleeding, infection, and rejection, as well as the possibility of other complications not specifically mentioned.    Donor Risk Factors: Prior to the operation, the patient was advised of any donor-specific risk factors requiring specific disclosure.  Factors in this case included nothing that required specific disclosure.      Specific PHS donor risk criteria for the organ donor include:  None    All questions were answered, the patient voiced appropriate understanding, and she agreed to proceed with the planned procedure.    ABO Confirmation: Immediately following arrival of the donor organ and prior to implantation, a formal ABO confirmation was done according to hospital and UNOS policies.  I confirmed the OS ID number ([id number]) of the donor organ and the donor and recipient ABO types, directly verifying these data by comparison with the UNOS Match Run report  ([id number]).  This confirmation was personally done by an attending surgeon and circulating nurse, and is officially documented elsewhere.    Time-Out: A complete time out was carried out prior to incision, with confirmation of patient identity, correct procedure, correct operative site, appropriate antibiotic prophylaxis, review of any known allergies, and presence of all needed equipment.    Procedure in Detail  Prior to starting the operation, the left kidney  was prepared on the back table. Arterial anatomy was double. Venous anatomy was single. Ureteral anatomy was single. Back table vascular reconstruction was not required  .  Unneeded fat was removed from the kidney, the vessels were cleaned of adherent tissue and tested for leaks, and the kidney was maintained at ice temperature in organ preservation solution until it was brought to the operative field.     The patient was brought into the operating room and placed in a supine position on the OR table.  After the induction of general endotracheal anesthesia, lines were placed by the anesthesiologist.  The urinary bladder was catheterized and irrigated with antibiotic solution.  There was no tension on the axillae and all pressure points were padded.  Sequential compression boots were used as were Gracie Huggers.  The abdomen was prepped and draped in the usual sterile fashion.  Skin was incised over the midline with a knife and deepened with electrocautery.  The cecum was mobilized, exposing the right common iliac artery and the IVC/right common iliac vein junction.  The right (native) ureter was observed crossing the vessels, and was ligated and divided in preparation of a planned ureteroureterostomy, as the ipsilateral native kidney is very shrunken and has been anuric for many years.The Bookwalter retractor was used to provide exposure.  Overlying lymphatics were ligated or cauterized and the vessels were dissected free for a length compatible with  anastomosis.  The kidney was brought to the OR table at 5/7/2025  3:38 PM.  Venous control was obtained with a vascular clamp.  A venotomy was made, the vein irrigated, and an end renal to right ivc vein anastomosis was created with 5-0 polypropylene.  Arterial control was obtained with a vascular clamp.  Arteriotomy was made, the artery irrigated, and an aortic patch to right common iliac artery anastomosis was created with 6-0 polypropylene.  The kidney was unclamped and reperfused at 5/7/2025  4:12 PM.  Reperfusion quality was good. A small amount of intraoperative urine production was observed, but it was not brisk..  After hemostasis was obtained, attention was directed to the construction of the uretero-ureterostomy, which was planned due to the very small size of the bladder and the need to place the kidney higher than usual due to two previous transplants.  The donor and native ureters were trimmed to a suitable length and spatulated on opposite sides (donor posterior, recipient anterior). Saline was flushed down the native ureter and confirmed to drain into the lyon as expected.  A 14 cm 6f double pigtail stent was placed, with care taken to ensure that the distal pigtail had been advanced into the bladder.   The ureters were sutured together with continuous 6-0 PDS.  The kidney was positioned laterally, behind the right colon, and all vessels appeared without kinks, albeit with modest extra length in the ureteral reconstruction.  A 15f Juan C drain was placed adjacent to the kidney to monitor for urine leak. With the kidney well perfused and sitting appropriately without tension on the anastomoses, viscera were replaced in their usual position.  The PD catheter was removed by dissecting both cuffs free with electrosurgery.  The wound was closed after a final check for hemostasis.  Overall, the graft quality was assessed to be good. At the end of the case the needle, sponge and instrument counts were all  correct.  Sterile dressings were applied and the patient was brought to the recovery room/ICU in good condition.    Estimated Blood Loss: 300 mL  Fluids Administered:   Crystalloid (mL) 1,700      Drains: 15f Juan C drains x 1  Specimens: PD catheter    Findings:    Organ Transplanted: Left Kidney    Arterial Anatomy: double  Number of Arteries: 2  Configuration of Multiple Arteries: common patch   Venous Anatomy: single  Number of Veins: 1  Ureteral Anatomy: single  Number of Ureters: 1  Reperfusion Quality: good  Overall Graft Quality: good  Intraoperative Urine Production: no  Roman: not to be removed before 5 days (very small bladder.  Ureteral Stent: Yes    Ischemic Times:   Anastomosis (warm ischemia) time: 34 minutes   Cold ischemia time: 1,511 minutes  Total ischemia time: 1545 minutes    Donor Data:  UNOS ID: [id number]    UNOS Match Run: [id number]    Donor Type: Donation after Brain Death    Donor CMV Status: Positive    Donor HBcAB: Negative    Donor HCV Status: Negative

## 2025-05-07 NOTE — Clinical Note
The catheter was inserted into the  proximal Right Atrium. Catheter was tunneled underneath the skin from the Right upper chest into the Right IJ vein then down into the Right Atrium.

## 2025-05-08 ENCOUNTER — APPOINTMENT (OUTPATIENT)
Dept: RADIOLOGY | Facility: HOSPITAL | Age: 25
End: 2025-05-08
Payer: MEDICARE

## 2025-05-08 PROBLEM — Z79.60 LONG-TERM USE OF IMMUNOSUPPRESSANT MEDICATION: Status: ACTIVE | Noted: 2025-05-08

## 2025-05-08 PROBLEM — Z29.89 PROPHYLACTIC IMMUNOTHERAPY: Status: ACTIVE | Noted: 2025-05-08

## 2025-05-08 PROBLEM — Z91.89 AT RISK FOR OPPORTUNISTIC INFECTIONS: Status: ACTIVE | Noted: 2025-05-08

## 2025-05-08 PROBLEM — Z94.0 STATUS POST DECEASED-DONOR KIDNEY TRANSPLANTATION: Status: ACTIVE | Noted: 2025-05-08

## 2025-05-08 LAB
ABSOLUTE EOSINOPHIL (OHS): 0 K/UL
ABSOLUTE EOSINOPHIL (OHS): 0.01 K/UL
ABSOLUTE MONOCYTE (OHS): 0.25 K/UL (ref 0.3–1)
ABSOLUTE MONOCYTE (OHS): 0.32 K/UL (ref 0.3–1)
ABSOLUTE NEUTROPHIL COUNT (OHS): 9.36 K/UL (ref 1.8–7.7)
ABSOLUTE NEUTROPHIL COUNT (OHS): 9.4 K/UL (ref 1.8–7.7)
ALBUMIN SERPL BCP-MCNC: 2 G/DL (ref 3.5–5.2)
ALBUMIN SERPL BCP-MCNC: 2.2 G/DL (ref 3.5–5.2)
ANION GAP (OHS): 11 MMOL/L (ref 8–16)
ANION GAP (OHS): 11 MMOL/L (ref 8–16)
ANION GAP (OHS): 9 MMOL/L (ref 8–16)
BASOPHILS # BLD AUTO: 0.02 K/UL
BASOPHILS # BLD AUTO: 0.03 K/UL
BASOPHILS NFR BLD AUTO: 0.2 %
BASOPHILS NFR BLD AUTO: 0.3 %
BUN SERPL-MCNC: 22 MG/DL (ref 6–20)
BUN SERPL-MCNC: 48 MG/DL (ref 6–20)
BUN SERPL-MCNC: 49 MG/DL (ref 6–20)
CALCIUM SERPL-MCNC: 7.5 MG/DL (ref 8.7–10.5)
CALCIUM SERPL-MCNC: 7.5 MG/DL (ref 8.7–10.5)
CALCIUM SERPL-MCNC: 8.3 MG/DL (ref 8.7–10.5)
CHLORIDE SERPL-SCNC: 103 MMOL/L (ref 95–110)
CHLORIDE SERPL-SCNC: 105 MMOL/L (ref 95–110)
CHLORIDE SERPL-SCNC: 99 MMOL/L (ref 95–110)
CO2 SERPL-SCNC: 21 MMOL/L (ref 23–29)
CO2 SERPL-SCNC: 21 MMOL/L (ref 23–29)
CO2 SERPL-SCNC: 26 MMOL/L (ref 23–29)
CREAT SERPL-MCNC: 5.2 MG/DL (ref 0.5–1.4)
CREAT SERPL-MCNC: 9.5 MG/DL (ref 0.5–1.4)
CREAT SERPL-MCNC: 9.6 MG/DL (ref 0.5–1.4)
ERYTHROCYTE [DISTWIDTH] IN BLOOD BY AUTOMATED COUNT: 16.6 % (ref 11.5–14.5)
ERYTHROCYTE [DISTWIDTH] IN BLOOD BY AUTOMATED COUNT: 16.7 % (ref 11.5–14.5)
GFR SERPLBLD CREATININE-BSD FMLA CKD-EPI: 11 ML/MIN/1.73/M2
GFR SERPLBLD CREATININE-BSD FMLA CKD-EPI: 5 ML/MIN/1.73/M2
GFR SERPLBLD CREATININE-BSD FMLA CKD-EPI: 5 ML/MIN/1.73/M2
GLUCOSE SERPL-MCNC: 109 MG/DL (ref 70–110)
GLUCOSE SERPL-MCNC: 144 MG/DL (ref 70–110)
GLUCOSE SERPL-MCNC: 257 MG/DL (ref 70–110)
HCT VFR BLD AUTO: 27.2 % (ref 37–48.5)
HCT VFR BLD AUTO: 28.5 % (ref 37–48.5)
HCV RNA SERPL NAA+PROBE-LOG IU: NOT DETECTED {LOG_IU}/ML
HGB BLD-MCNC: 8.4 GM/DL (ref 12–16)
HGB BLD-MCNC: 8.6 GM/DL (ref 12–16)
IMM GRANULOCYTES # BLD AUTO: 0.05 K/UL (ref 0–0.04)
IMM GRANULOCYTES # BLD AUTO: 0.06 K/UL (ref 0–0.04)
IMM GRANULOCYTES NFR BLD AUTO: 0.5 % (ref 0–0.5)
IMM GRANULOCYTES NFR BLD AUTO: 0.6 % (ref 0–0.5)
LYMPHOCYTES # BLD AUTO: 0.07 K/UL (ref 1–4.8)
LYMPHOCYTES # BLD AUTO: 0.15 K/UL (ref 1–4.8)
MAGNESIUM SERPL-MCNC: 2.3 MG/DL (ref 1.6–2.6)
MCH RBC QN AUTO: 30.9 PG (ref 27–31)
MCH RBC QN AUTO: 31.2 PG (ref 27–31)
MCHC RBC AUTO-ENTMCNC: 30.2 G/DL (ref 32–36)
MCHC RBC AUTO-ENTMCNC: 30.9 G/DL (ref 32–36)
MCV RBC AUTO: 101 FL (ref 82–98)
MCV RBC AUTO: 103 FL (ref 82–98)
NUCLEATED RBC (/100WBC) (OHS): 0 /100 WBC
NUCLEATED RBC (/100WBC) (OHS): 0 /100 WBC
OHS QRS DURATION: 86 MS
OHS QTC CALCULATION: 537 MS
PHOSPHATE SERPL-MCNC: 5.4 MG/DL (ref 2.7–4.5)
PHOSPHATE SERPL-MCNC: 7.6 MG/DL (ref 2.7–4.5)
PLATELET # BLD AUTO: 225 K/UL (ref 150–450)
PLATELET # BLD AUTO: 255 K/UL (ref 150–450)
PMV BLD AUTO: 8.8 FL (ref 9.2–12.9)
PMV BLD AUTO: 8.9 FL (ref 9.2–12.9)
POCT GLUCOSE: 117 MG/DL (ref 70–110)
POCT GLUCOSE: 119 MG/DL (ref 70–110)
POCT GLUCOSE: 131 MG/DL (ref 70–110)
POCT GLUCOSE: 146 MG/DL (ref 70–110)
POCT GLUCOSE: 160 MG/DL (ref 70–110)
POCT GLUCOSE: 389 MG/DL (ref 70–110)
POTASSIUM SERPL-SCNC: 4.2 MMOL/L (ref 3.5–5.1)
POTASSIUM SERPL-SCNC: 5 MMOL/L (ref 3.5–5.1)
POTASSIUM SERPL-SCNC: 5.9 MMOL/L (ref 3.5–5.1)
RBC # BLD AUTO: 2.69 M/UL (ref 4–5.4)
RBC # BLD AUTO: 2.78 M/UL (ref 4–5.4)
RELATIVE EOSINOPHIL (OHS): 0 %
RELATIVE EOSINOPHIL (OHS): 0.1 %
RELATIVE LYMPHOCYTE (OHS): 0.7 % (ref 18–48)
RELATIVE LYMPHOCYTE (OHS): 1.5 % (ref 18–48)
RELATIVE MONOCYTE (OHS): 2.6 % (ref 4–15)
RELATIVE MONOCYTE (OHS): 3.2 % (ref 4–15)
RELATIVE NEUTROPHIL (OHS): 94.4 % (ref 38–73)
RELATIVE NEUTROPHIL (OHS): 95.9 % (ref 38–73)
SODIUM SERPL-SCNC: 134 MMOL/L (ref 136–145)
SODIUM SERPL-SCNC: 135 MMOL/L (ref 136–145)
SODIUM SERPL-SCNC: 137 MMOL/L (ref 136–145)
TACROLIMUS BLD-MCNC: <2 NG/ML (ref 5–15)
WBC # BLD AUTO: 9.76 K/UL (ref 3.9–12.7)
WBC # BLD AUTO: 9.96 K/UL (ref 3.9–12.7)

## 2025-05-08 PROCEDURE — 20600001 HC STEP DOWN PRIVATE ROOM

## 2025-05-08 PROCEDURE — 85025 COMPLETE CBC W/AUTO DIFF WBC: CPT | Performed by: PHYSICIAN ASSISTANT

## 2025-05-08 PROCEDURE — 63600175 PHARM REV CODE 636 W HCPCS: Performed by: PHYSICIAN ASSISTANT

## 2025-05-08 PROCEDURE — 25000003 PHARM REV CODE 250: Mod: UD | Performed by: PHYSICIAN ASSISTANT

## 2025-05-08 PROCEDURE — 25000003 PHARM REV CODE 250: Mod: UD | Performed by: STUDENT IN AN ORGANIZED HEALTH CARE EDUCATION/TRAINING PROGRAM

## 2025-05-08 PROCEDURE — 99233 SBSQ HOSP IP/OBS HIGH 50: CPT | Mod: 24,,, | Performed by: PHYSICIAN ASSISTANT

## 2025-05-08 PROCEDURE — 80197 ASSAY OF TACROLIMUS: CPT | Performed by: STUDENT IN AN ORGANIZED HEALTH CARE EDUCATION/TRAINING PROGRAM

## 2025-05-08 PROCEDURE — 80048 BASIC METABOLIC PNL TOTAL CA: CPT | Performed by: PHYSICIAN ASSISTANT

## 2025-05-08 PROCEDURE — 27000207 HC ISOLATION

## 2025-05-08 PROCEDURE — 63600175 PHARM REV CODE 636 W HCPCS: Performed by: TRANSPLANT SURGERY

## 2025-05-08 PROCEDURE — 80100014 HC HEMODIALYSIS 1:1

## 2025-05-08 PROCEDURE — 5A1D70Z PERFORMANCE OF URINARY FILTRATION, INTERMITTENT, LESS THAN 6 HOURS PER DAY: ICD-10-PCS | Performed by: STUDENT IN AN ORGANIZED HEALTH CARE EDUCATION/TRAINING PROGRAM

## 2025-05-08 PROCEDURE — 25000003 PHARM REV CODE 250: Performed by: PHYSICIAN ASSISTANT

## 2025-05-08 PROCEDURE — 63600175 PHARM REV CODE 636 W HCPCS: Mod: JZ,TB,UD | Performed by: STUDENT IN AN ORGANIZED HEALTH CARE EDUCATION/TRAINING PROGRAM

## 2025-05-08 PROCEDURE — 76776 US EXAM K TRANSPL W/DOPPLER: CPT | Mod: TC

## 2025-05-08 PROCEDURE — 80048 BASIC METABOLIC PNL TOTAL CA: CPT | Performed by: STUDENT IN AN ORGANIZED HEALTH CARE EDUCATION/TRAINING PROGRAM

## 2025-05-08 PROCEDURE — 85025 COMPLETE CBC W/AUTO DIFF WBC: CPT | Performed by: STUDENT IN AN ORGANIZED HEALTH CARE EDUCATION/TRAINING PROGRAM

## 2025-05-08 PROCEDURE — 83735 ASSAY OF MAGNESIUM: CPT | Performed by: STUDENT IN AN ORGANIZED HEALTH CARE EDUCATION/TRAINING PROGRAM

## 2025-05-08 RX ORDER — SEVELAMER CARBONATE 800 MG/1
800 TABLET, FILM COATED ORAL
Status: DISCONTINUED | OUTPATIENT
Start: 2025-05-08 | End: 2025-05-09

## 2025-05-08 RX ORDER — OXYBUTYNIN CHLORIDE 5 MG/1
5 TABLET ORAL 3 TIMES DAILY
Status: DISCONTINUED | OUTPATIENT
Start: 2025-05-08 | End: 2025-05-13 | Stop reason: HOSPADM

## 2025-05-08 RX ORDER — FUROSEMIDE 10 MG/ML
60 INJECTION INTRAMUSCULAR; INTRAVENOUS ONCE
Status: COMPLETED | OUTPATIENT
Start: 2025-05-08 | End: 2025-05-08

## 2025-05-08 RX ORDER — SODIUM CHLORIDE 9 MG/ML
INJECTION, SOLUTION INTRAVENOUS
OUTPATIENT
Start: 2025-05-08

## 2025-05-08 RX ORDER — SULFAMETHOXAZOLE AND TRIMETHOPRIM 400; 80 MG/1; MG/1
1 TABLET ORAL DAILY
Qty: 30 TABLET | Refills: 5 | Status: SHIPPED | OUTPATIENT
Start: 2025-05-08 | End: 2025-05-11

## 2025-05-08 RX ORDER — MIDODRINE HYDROCHLORIDE 5 MG/1
5 TABLET ORAL 3 TIMES DAILY
Status: ACTIVE | OUTPATIENT
Start: 2025-05-08 | End: 2025-05-09

## 2025-05-08 RX ORDER — MIDODRINE HYDROCHLORIDE 5 MG/1
5 TABLET ORAL ONCE
Status: COMPLETED | OUTPATIENT
Start: 2025-05-08 | End: 2025-05-08

## 2025-05-08 RX ORDER — SODIUM CHLORIDE 9 MG/ML
INJECTION, SOLUTION INTRAVENOUS CONTINUOUS
Status: ACTIVE | OUTPATIENT
Start: 2025-05-08 | End: 2025-05-08

## 2025-05-08 RX ORDER — ALUMINUM HYDROXIDE, MAGNESIUM HYDROXIDE, AND SIMETHICONE 1200; 120; 1200 MG/30ML; MG/30ML; MG/30ML
30 SUSPENSION ORAL EVERY 6 HOURS PRN
Status: DISCONTINUED | OUTPATIENT
Start: 2025-05-08 | End: 2025-05-13 | Stop reason: HOSPADM

## 2025-05-08 RX ORDER — MYCOPHENOLATE MOFETIL 250 MG/1
1000 CAPSULE ORAL 2 TIMES DAILY
Qty: 240 CAPSULE | Refills: 11 | Status: SHIPPED | OUTPATIENT
Start: 2025-05-08

## 2025-05-08 RX ORDER — TACROLIMUS 1 MG/1
6 CAPSULE ORAL EVERY 12 HOURS
Qty: 360 CAPSULE | Refills: 11 | Status: SHIPPED | OUTPATIENT
Start: 2025-05-08 | End: 2025-05-12

## 2025-05-08 RX ORDER — CALCIUM CARBONATE 200(500)MG
500 TABLET,CHEWABLE ORAL 3 TIMES DAILY PRN
Status: DISCONTINUED | OUTPATIENT
Start: 2025-05-08 | End: 2025-05-13

## 2025-05-08 RX ORDER — OXYBUTYNIN CHLORIDE 5 MG/1
5 TABLET ORAL 3 TIMES DAILY
Status: CANCELLED | OUTPATIENT
Start: 2025-05-08

## 2025-05-08 RX ORDER — OXYCODONE HYDROCHLORIDE 5 MG/1
5 TABLET ORAL EVERY 4 HOURS PRN
Refills: 0 | Status: DISCONTINUED | OUTPATIENT
Start: 2025-05-08 | End: 2025-05-13 | Stop reason: HOSPADM

## 2025-05-08 RX ORDER — SODIUM CHLORIDE 9 MG/ML
INJECTION, SOLUTION INTRAVENOUS ONCE
OUTPATIENT
Start: 2025-05-08 | End: 2025-05-08

## 2025-05-08 RX ORDER — VALGANCICLOVIR 450 MG/1
900 TABLET, FILM COATED ORAL DAILY
Qty: 60 TABLET | Refills: 5 | Status: SHIPPED | OUTPATIENT
Start: 2025-05-08 | End: 2025-05-11

## 2025-05-08 RX ORDER — PREDNISONE 5 MG/1
TABLET ORAL
Qty: 70 TABLET | Refills: 11 | Status: SHIPPED | OUTPATIENT
Start: 2025-05-08 | End: 2025-05-08

## 2025-05-08 RX ORDER — MYCOPHENOLATE MOFETIL 250 MG/1
1000 CAPSULE ORAL 2 TIMES DAILY
Qty: 240 CAPSULE | Refills: 11 | Status: SHIPPED | OUTPATIENT
Start: 2025-05-08 | End: 2025-05-08

## 2025-05-08 RX ORDER — OXYBUTYNIN CHLORIDE 5 MG/1
5 TABLET ORAL 3 TIMES DAILY PRN
Status: DISCONTINUED | OUTPATIENT
Start: 2025-05-08 | End: 2025-05-08

## 2025-05-08 RX ORDER — OXYCODONE HYDROCHLORIDE 10 MG/1
10 TABLET ORAL EVERY 4 HOURS PRN
Refills: 0 | Status: DISCONTINUED | OUTPATIENT
Start: 2025-05-08 | End: 2025-05-13 | Stop reason: HOSPADM

## 2025-05-08 RX ORDER — PREDNISONE 5 MG/1
TABLET ORAL
Qty: 70 TABLET | Refills: 11 | Status: SHIPPED | OUTPATIENT
Start: 2025-05-08

## 2025-05-08 RX ORDER — TACROLIMUS 1 MG/1
6 CAPSULE ORAL EVERY 12 HOURS
Qty: 360 CAPSULE | Refills: 11 | Status: SHIPPED | OUTPATIENT
Start: 2025-05-08 | End: 2025-05-08

## 2025-05-08 RX ORDER — MIDODRINE HYDROCHLORIDE 5 MG/1
5 TABLET ORAL ONCE
Status: CANCELLED | OUTPATIENT
Start: 2025-05-08

## 2025-05-08 RX ADMIN — OXYBUTYNIN CHLORIDE 5 MG: 5 TABLET ORAL at 11:05

## 2025-05-08 RX ADMIN — CALCIUM GLUCONATE 1 G: 20 INJECTION, SOLUTION INTRAVENOUS at 12:05

## 2025-05-08 RX ADMIN — MIDODRINE HYDROCHLORIDE 5 MG: 5 TABLET ORAL at 04:05

## 2025-05-08 RX ADMIN — DOCUSATE SODIUM 100 MG: 100 CAPSULE, LIQUID FILLED ORAL at 02:05

## 2025-05-08 RX ADMIN — BISACODYL 10 MG: 5 TABLET, COATED ORAL at 08:05

## 2025-05-08 RX ADMIN — MYCOPHENOLATE MOFETIL 1000 MG: 250 CAPSULE ORAL at 09:05

## 2025-05-08 RX ADMIN — FAMOTIDINE 20 MG: 20 TABLET, FILM COATED ORAL at 08:05

## 2025-05-08 RX ADMIN — TRAMADOL HYDROCHLORIDE 50 MG: 50 TABLET, COATED ORAL at 09:05

## 2025-05-08 RX ADMIN — SODIUM CHLORIDE 125 MG: 9 INJECTION, SOLUTION INTRAVENOUS at 04:05

## 2025-05-08 RX ADMIN — DIPHENHYDRAMINE HYDROCHLORIDE 25 MG: 25 CAPSULE ORAL at 03:05

## 2025-05-08 RX ADMIN — DEXTROSE MONOHYDRATE 50 G: 25 INJECTION, SOLUTION INTRAVENOUS at 03:05

## 2025-05-08 RX ADMIN — CEFAZOLIN 2 G: 2 INJECTION, POWDER, FOR SOLUTION INTRAMUSCULAR; INTRAVENOUS at 05:05

## 2025-05-08 RX ADMIN — OXYBUTYNIN CHLORIDE 5 MG: 5 TABLET ORAL at 09:05

## 2025-05-08 RX ADMIN — OXYBUTYNIN CHLORIDE 5 MG: 5 TABLET ORAL at 01:05

## 2025-05-08 RX ADMIN — SEVELAMER CARBONATE 800 MG: 800 TABLET, FILM COATED ORAL at 06:05

## 2025-05-08 RX ADMIN — MIDODRINE HYDROCHLORIDE 5 MG: 5 TABLET ORAL at 08:05

## 2025-05-08 RX ADMIN — DOCUSATE SODIUM 100 MG: 100 CAPSULE, LIQUID FILLED ORAL at 09:05

## 2025-05-08 RX ADMIN — SODIUM CHLORIDE: 9 INJECTION, SOLUTION INTRAVENOUS at 11:05

## 2025-05-08 RX ADMIN — FUROSEMIDE 60 MG: 10 INJECTION, SOLUTION INTRAMUSCULAR; INTRAVENOUS at 03:05

## 2025-05-08 RX ADMIN — OXYCODONE 10 MG: 5 TABLET ORAL at 12:05

## 2025-05-08 RX ADMIN — FUROSEMIDE 60 MG: 10 INJECTION, SOLUTION INTRAMUSCULAR; INTRAVENOUS at 12:05

## 2025-05-08 RX ADMIN — OXYBUTYNIN CHLORIDE 5 MG: 5 TABLET ORAL at 02:05

## 2025-05-08 RX ADMIN — FUROSEMIDE 100 MG: 10 INJECTION, SOLUTION INTRAMUSCULAR; INTRAVENOUS at 12:05

## 2025-05-08 RX ADMIN — METHYLPREDNISOLONE SODIUM SUCCINATE 250 MG: 125 INJECTION, POWDER, FOR SOLUTION INTRAMUSCULAR; INTRAVENOUS at 02:05

## 2025-05-08 RX ADMIN — OXYCODONE HYDROCHLORIDE 10 MG: 10 TABLET ORAL at 04:05

## 2025-05-08 RX ADMIN — DOCUSATE SODIUM 100 MG: 100 CAPSULE, LIQUID FILLED ORAL at 08:05

## 2025-05-08 RX ADMIN — ACETAMINOPHEN 650 MG: 325 TABLET ORAL at 08:05

## 2025-05-08 RX ADMIN — HEPARIN SODIUM 5000 UNITS: 5000 INJECTION INTRAVENOUS; SUBCUTANEOUS at 05:05

## 2025-05-08 RX ADMIN — TACROLIMUS 3 MG: 1 CAPSULE ORAL at 06:05

## 2025-05-08 RX ADMIN — MIDODRINE HYDROCHLORIDE 5 MG: 5 TABLET ORAL at 06:05

## 2025-05-08 RX ADMIN — THERA TABS 1 TABLET: TAB at 09:05

## 2025-05-08 RX ADMIN — INSULIN HUMAN 5.9 UNITS: 100 INJECTION, SOLUTION PARENTERAL at 02:05

## 2025-05-08 RX ADMIN — TACROLIMUS 3 MG: 1 CAPSULE ORAL at 09:05

## 2025-05-08 RX ADMIN — HEPARIN SODIUM 5000 UNITS: 5000 INJECTION INTRAVENOUS; SUBCUTANEOUS at 03:05

## 2025-05-08 RX ADMIN — MUPIROCIN 1 G: 20 OINTMENT TOPICAL at 08:05

## 2025-05-08 RX ADMIN — ACETAMINOPHEN 650 MG: 325 TABLET ORAL at 03:05

## 2025-05-08 RX ADMIN — MUPIROCIN 1 G: 20 OINTMENT TOPICAL at 11:05

## 2025-05-08 RX ADMIN — HEPARIN SODIUM 5000 UNITS: 5000 INJECTION INTRAVENOUS; SUBCUTANEOUS at 09:05

## 2025-05-08 RX ADMIN — MYCOPHENOLATE MOFETIL 1000 MG: 250 CAPSULE ORAL at 08:05

## 2025-05-08 NOTE — NURSING TRANSFER
Nursing Transfer Note      5/7/2025   9:46 PM    Reason patient is being transferred: post procedure    Transfer To: [id number]    Transfer via bed    Transported by RN and PCT    Medicines sent: mucipron    Any special needs or follow-up needed: routine    Patient belongings transferred with patient: No    Chart send with patient: Yes    Notified: mother    Patient reassessed at: 05/07/2025 @ 1900     Upon arrival to floor: patient oriented to room, call bell in reach, and bed in lowest position

## 2025-05-08 NOTE — ANESTHESIA POSTPROCEDURE EVALUATION
Anesthesia Post Evaluation    Patient: Anan Gabriel    Procedure(s) Performed: Procedure(s) (LRB):  TRANSPLANT, KIDNEY (N/A)    Final Anesthesia Type: general      Patient location during evaluation: PACU  Patient participation: Yes- Able to Participate  Level of consciousness: awake and alert  Post-procedure vital signs: reviewed and stable  Pain management: adequate  Airway patency: patent    PONV status at discharge: No PONV  Anesthetic complications: no      Cardiovascular status: blood pressure returned to baseline  Respiratory status: spontaneous ventilation and room air  Hydration status: euvolemic  Follow-up not needed.              Vitals Value Taken Time   BP 97/54 05/07/25 19:01   Temp 36.5 °C (97.7 °F) 05/07/25 18:05   Pulse 63 05/07/25 19:05   Resp 14 05/07/25 19:05   SpO2 100 % 05/07/25 19:05   Vitals shown include unfiled device data.      Event Time   Out of Recovery 19:00:00         Pain/Nain Score: Pain Rating Prior to Med Admin: 10 (5/7/2025  6:48 PM)  Nain Score: 9 (5/7/2025  7:00 PM)

## 2025-05-08 NOTE — PLAN OF CARE
Patient AAOx4. VSS on RA. Afebrile. NSR on Tele. Pt c/o pain, controlled by PRN medications. D51/2NS infusing @ 50cc/hr to RIJ w/o issues o/n. Midline inc anushka w/ dermabond- free from signs of infection. RLQ LILLIAN drain in place- seroussang output noted. Roman in place- CAUTI precautions maintained. UOP 25-50cc/hr o/n. No BM/gas reported this shift- see I&O for details. Up w/ assist. Remained in bed this shift d/t anaesthesia. Bed locked and lowered, call bell in reach, nonskid socks on when OOB. Reviewed plan of care and fall precautions w/ pt- pt verbalized understanding. Reminded to call for assistance. Standard precautions maintained. Mother at bedside. Thymo completed this shift w/o issues o/n. Self meds ready for teaching. Plan for HD this am for elevated K @5.9, post 160mg Lasix and 1x shift.

## 2025-05-08 NOTE — PROGRESS NOTES
Admit Note     Met with patient and mother to assess needs. Patient is a 24 y.o. single female, admitted for for kidney transplant.      Patient admitted from home on 5/7/2025 .  At this time, patient presents as alert and oriented x 4, pleasant, good eye contact, calm, communicative, cooperative, and asking and answering questions appropriately.  At this time, patients caregiver presents as alert and oriented x 4, pleasant, calm, communicative, cooperative, and asking and answering questions appropriately.    Household/Family Systems     Patient resides with patient's mother, at [address] [place] [address].  Support system includes  Francesca (mother) [phone], Edouard Blanco ( brother) [phone], and Beni Estevez (uncle) [phone].  Patient does not have dependents that are need of being cared for.     Patients primary caregiver is Edouard Blanco, patients brother, phone number [phone].  Confirmed patients contact information is [phone] (home);   Telephone Information:   Mobile [phone]   .    During admission, patient's caregiver plans to stay at home.  Confirmed patient and patients caregivers do have access to reliable transportation.    Cognitive Status/Learning     Patient reports reading ability as college and states patient does not have difficulty with reading, writing, seeing, hearing, comprehension, learning, and memory.  Patient reports patient learns best by multisensory .   Needed: No.   Highest education level: Associate/Bachelor Degree    Vocation/Disability   .  Working for Income: yes  If yes, working activity level: Working Part Time Due to Disability  The patient reported that she is employed by [employer].     Adherence     Patient reports a high level of adherence to patients health care regimen.  Adherence counseling and education provided. Patient verbalizes understanding.    Substance Use    Patient reports the following substance usage.     Tobacco: none, patient denies any use.  Alcohol: none, patient denies any use.  Illicit Drugs/Non-prescribed Medications: none, patient denies any use.  Patient states clear understanding of the potential impact of substance use.  Substance abstinence/cessation counseling, education and resources provided and reviewed.     Services Utilizing/ADLS    Infusion Service: Prior to admission, patient utilizing? no  Home Health: Prior to admission, patient utilizing? no  DME: Prior to admission, yes scale, thermometer, scale, PD machine and supplies.   Pulmonary/Cardiac Rehab: Prior to admission, no  Dialysis:  Prior to admission, yes- [facility] [place]  Transplant Specialty Pharmacy:  Prior to admission, yes;  .    Prior to admission, patient reports patient was independent with ADLS and was driving.  Patient reports patient is not able to care for self at this time due to compromised medical condition (as documented in medical record) and physical weakness..  Patient indicates a willingness to care for self once medically cleared to do so.    Insurance/Medications    Insured by   Payer/Plan Subscr  Sex Relation Sub. Ins. ID Effective Group Num   1. MEDICARE - MEVIN MAR 10/15/00 Female Self [id number]                                    [address]   [facility] Self [id number]                                    [address] [address]      Primary Insurance (for UNOS reporting): Public Insurance - Medicare & Choice  Secondary Insurance (for UNOS reporting): Public Insurance - Medicaid    Patient reports patient is able to obtain and afford medications at this time and at time of discharge.    Living Will/Healthcare Power of     Patient states patient does not have a LW and/or HCPA.   provided education regarding LW and HCPA and the completion of forms.    Coping/Mental Health    Patient is coping adequately with the aid of  family members.   Patient denies mental  health difficulties.     Discharge Planning    At time of discharge, patient plans to return to brother's home at [address] [facility] [address] under the care of Edouard Blanco ( brother) [phone].   Patients mother will transport patient.  Per rounds today, expected discharge date has not been medically determined at this time. Patient and patients caregiver  verbalize understanding and are involved in treatment planning and discharge process.    Additional Concerns    Patient's caretaker denies additional needs and/or concerns at this time. Patient is being followed for needs, education, resources, information, emotional support, supportive counseling, and for supportive and skilled discharge plan of care.  providing ongoing psychosocial support, education, resources and d/c planning as needed.  SW remains available.  provided resource list, patient choice, psychosocial and supportive counseling, resources, education, assistance and discharge planning with patient and caregiver involvement, ongoing SW availability and services as appropriate.  remains available.

## 2025-05-08 NOTE — NURSING
Patient arrived to unit via stretcher w/ PACU, RN. VSS upon arrival. On RA. D51/2NS and Thymo infusing upon arrival. Midline inc anushka w/ dermabond noted. RLQ LILLIAN in place.  MAR reviewed. Oriented to room, call bell in reach, nonskid socks in place. Reviewed plan of care and fall precautions w/ pt- pt verbalized understanding. Personal belongings at bedside. Mother at bedside, attentive to pt. STAT labs pulled and sent per orders. ADORE Bradley notified of pt arrival to floor.

## 2025-05-08 NOTE — PROGRESS NOTES
[facility]  Kidney Transplant  Progress Note      Reason for Follow-up: Reassessment of Kidney Transplant - 5/7/2025  (#1) recipient and management of immunosuppression.    ORGAN: LEFT KIDNEY      Donor Type: Donation after Brain Death      Donor CMV Status: Positive  Donor HBcAB:Negative  Donor HCV Status:Negative  Donor HBV JIE:   Donor HCV JIE: Negative      Subjective:   History of Present Illness:  Ms. Gabriel is a 24 y.o. White female who has ESRD 2/2 FSGS. She has had 2 previous kidney transplants (~ 2015 at [facility], 12/2020 at [facility], [place]). 1st kidney lost to hyper acute rejection and 2nd kidney due to infection. She is on PD since 2021, tolerating well, denies hx peritonitis. PMH also includes: seizure and hx PRES in setting of HTN emergency, decreased bladder capacity seen by urology. Anuric. She presents as a primary candidate for a kidney transplant. Reports being in her usual state of health, denies recent illnesses or hospitalizations. Per urology, needs oxybutynin POD1 and repeat urodynamics outpatient once bladder enlarges again to assess for hostile bladder at larger volumes. Surgery scheduled for 1100 with Dr. Stern. Pre op labs and imaging pending, will be reviewed prior to transplant     Ms. Gabriel is a 24 y.o. year old female who is status post Kidney Transplant - 5/7/2025  (#1).  Her maintenance immunosuppression consists of:   Immunosuppressants (From admission, onward)      Start     Stop Route Frequency Ordered    05/08/25 0900  antithymocyte globulin (rabbit) 125 mg, hydrocortisone sodium succinate (SOLU-CORTEF) 20 mg in 0.9% NaCl 500 mL (FOR PERIPHERAL LINE ADMINISTRATION ONLY)  (IP TXP KIDNEY POST-OP THYMO WITH PERIPHERAL PRECHECKED IBW)         05/09/25 0859 IV Daily 05/07/25 1807 05/07/25 2200  mycophenolate capsule 1,000 mg  (IP TXP KIDNEY POST-OP THYMO WITH PERIPHERAL PRECHECKED IBW)         -- Oral 2 times daily 05/07/25 1807 05/07/25 1815  tacrolimus  capsule 3 mg  (IP TXP KIDNEY POST-OP THYMO WITH PERIPHERAL PRECHECKED IBW)         -- Oral 2 times daily 05/07/25 6477            Hospital Course:  Patient s/p DBD Ktx (#3) and PD catheter removal on 5/7/25. 5 day lyon (U-U.small bladder + 4 week stent) and LILLIAN x 1. Patient with hyperkalemia overnight requiring HD this am. Making some urine. Plan for IV Lasix today. BP low in PACU, H/H stable. No overt signs of bleeding. SBP remains 90s-100s. Will start Midodrine TID x 24 hrs to monitor BP. Pt reports generalized abd pain this am. Tolerating diet. Encouraged ambulation. Cont IVF. Monitor.       Past Medical, Surgical, Family, and Social History:   Unchanged from H&P.    Scheduled Meds:   acetaminophen  650 mg Oral Q8H    acetaminophen  650 mg Oral Q24H    antithymocyte globulin (rabbit) 125 mg, hydrocortisone sodium succinate (SOLU-CORTEF) 20 mg in 0.9% NaCl 500 mL (FOR PERIPHERAL LINE ADMINISTRATION ONLY)  2.25 mg/kg (Ideal) Intravenous Daily    bisacodyL  10 mg Oral QHS    diphenhydrAMINE  25 mg Oral Q24H    docusate sodium  100 mg Oral TID    famotidine  20 mg Oral QHS    furosemide (LASIX) injection  60 mg Intravenous Once    heparin (porcine)  5,000 Units Subcutaneous Q8H    methylPREDNISolone injection (PEDS and ADULTS)  250 mg Intravenous Once    midodrine  5 mg Oral TID    multivitamin  1 tablet Oral Daily    mupirocin  1 g Nasal BID    mycophenolate  1,000 mg Oral BID    oxybutynin  5 mg Oral TID    [START ON 5/9/2025] predniSONE  20 mg Oral Daily    sevelamer carbonate  800 mg Oral TID WM    [START ON 5/17/2025] sulfamethoxazole-trimethoprim 400-80mg  1 tablet Oral Daily AM    tacrolimus  3 mg Oral BID    [START ON 5/17/2025] valGANciclovir  450 mg Oral Daily AM     Continuous Infusions:   0.9% NaCl   Intravenous Continuous        glucagon (human recombinant)  1 mg Intramuscular Continuous PRN         PRN Meds:  Current Facility-Administered Medications:     dextrose 50%, 12.5 g, Intravenous, PRN     dextrose 50%, 12.5 g, Intravenous, PRN    dextrose 50%, 25 g, Intravenous, PRN    [COMPLETED] dextrose 50%, 50 g, Intravenous, Once **AND** dextrose 50%, 25 g, Intravenous, PRN **AND** [COMPLETED] insulin regular, 0.1 Units/kg, Intravenous, Once    diphenhydrAMINE, 50 mg, Oral, Once PRN    EPINEPHrine (PF), 1 mg, Subcutaneous, Once PRN    glucagon (human recombinant), 1 mg, Intramuscular, PRN    glucagon (human recombinant), 1 mg, Intramuscular, Continuous PRN    glucose, 16 g, Oral, PRN    glucose, 16 g, Oral, PRN    glucose, 16 g, Oral, PRN    glucose, 24 g, Oral, PRN    glucose, 24 g, Oral, PRN    hydrocortisone sodium succinate, 100 mg, Intravenous, Once PRN    insulin aspart U-100, 0-5 Units, Subcutaneous, AC + HS + 0200 PRN    insulin aspart U-100, 0-5 Units, Subcutaneous, QID (AC + HS) PRN    ondansetron, 4 mg, Intravenous, Q6H PRN    oxyCODONE, 5 mg, Oral, Q6H PRN    sodium chloride 0.9%, 10 mL, Intravenous, PRN    traMADoL, 50 mg, Oral, Q6H PRN    Intake/Output - Last 3 Shifts         05/06 0700  05/07 0659 05/07 0700  05/08 0659 05/08 0700  05/09 0659    P.O.  120     Other   300    IV Piggyback  2450.9     Total Intake(mL/kg)  2570.9 (42.6) 300 (5)    Urine (mL/kg/hr)  480 (0.3) 10 (0)    Emesis/NG output  0     Drains  30     Other   500    Stool  0     Blood  250     Total Output  760 510    Net  +1810.9 -210           Unmeasured Urine Occurrence  0 x     Unmeasured Stool Occurrence  0 x     Unmeasured Emesis Occurrence  0 x              Review of Systems   Constitutional:  Negative for activity change, appetite change and fever.   Respiratory:  Negative for cough and shortness of breath.    Cardiovascular:  Negative for chest pain and leg swelling.   Gastrointestinal:  Positive for abdominal pain. Negative for abdominal distention, diarrhea, nausea and vomiting.   Genitourinary:  Positive for decreased urine volume. Negative for difficulty urinating.   Skin:  Negative for wound.   Allergic/Immunologic:  "Positive for immunocompromised state.   Neurological:  Negative for dizziness and weakness.      Objective:     Vital Signs (Most Recent):  Temp: 98.2 °F (36.8 °C) (05/08/25 0828)  Pulse: 88 (05/08/25 0935)  Resp: 20 (05/08/25 0935)  BP: (!) 94/54 (05/08/25 0935)  SpO2: 95 % (05/08/25 0935) Vital Signs (24h Range):  Temp:  [97.3 °F (36.3 °C)-98.2 °F (36.8 °C)] 98.2 °F (36.8 °C)  Pulse:  [61-92] 88  Resp:  [13-27] 20  SpO2:  [83 %-100 %] 95 %  BP: ()/(40-71) 94/54     Weight: 60.4 kg (133 lb 2.5 oz)  Height: 5' 2" (157.5 cm)  Body mass index is 24.35 kg/m².     Physical Exam  Vitals reviewed.   Constitutional:       Appearance: She is well-developed.   Eyes:      Pupils: Pupils are equal, round, and reactive to light.   Cardiovascular:      Rate and Rhythm: Normal rate and regular rhythm.      Heart sounds: No murmur heard.  Pulmonary:      Effort: Pulmonary effort is normal. No respiratory distress.      Breath sounds: Normal breath sounds. No wheezing.   Abdominal:      General: Bowel sounds are normal. There is no distension.      Palpations: Abdomen is soft.      Tenderness: There is abdominal tenderness. There is no guarding.      Comments: Midline incision with dermabond  LILLIAN x 1- ss drainage   Musculoskeletal:         General: Normal range of motion.      Cervical back: Normal range of motion.   Skin:     General: Skin is warm and dry.   Neurological:      Mental Status: She is alert and oriented to person, place, and time.   Psychiatric:         Mood and Affect: Mood normal.         Behavior: Behavior normal.         Thought Content: Thought content normal.         Judgment: Judgment normal.          Laboratory:  CBC:   Recent Labs   Lab 05/07/25  0430 05/07/25  1227 05/07/25  1811 05/07/25  2146 05/08/25  0502   WBC 7.71  --   --  11.93 9.76   RBC 3.05*  --   --  2.96* 2.78*   HGB 9.3*  --   --  9.3* 8.6*   HCT 30.4*   < > 29.5* 30.0* 28.5*     --   --  271 225   *  --   --  101* 103* " "  MCH 30.5  --   --  31.4* 30.9   MCHC 30.6*  --   --  31.0* 30.2*    < > = values in this interval not displayed.     CMP:   Recent Labs   Lab 25  0430 25  1811 25  2146 25  0104 25  0502   GLU 93 139* 150* 144* 257*   CALCIUM 7.9* 6.7* 7.2* 7.5* 7.5*   ALBUMIN 2.5* 2.0*  --   --  2.0*   PROT 5.7*  --   --   --   --     136 136 137 135*   K 3.8 4.8 5.4* 5.9* 5.0   CO2 26 20* 21* 21* 21*   CL 98 101 102 105 103   BUN 45* 44* 45* 48* 49*   CREATININE 10.3* 9.7* 9.6* 9.5* 9.6*   ALKPHOS 189*  --   --   --   --    ALT 16  --   --   --   --    AST 16  --   --   --   --      Labs within the past 24 hours have been reviewed.    Diagnostic Results:  Intra-op US reviewed by surgeon  Assessment/Plan:     * FSGS (focal segmental glomerulosclerosis)  - daily urine Pr/Cr      Prophylactic immunotherapy  - Continue Cellcept and Prograf. Will monitor for signs of toxic side effects, check daily tacrolimus troughs, and change meds accordingly.       Long-term use of immunosuppressant medication  - see "prophylactic immuno"      At risk for opportunistic infections  - Valcyte for CMV prophylaxis  - Bactrim for PCP prophylaxis   To be started on POD 10      Status post -donor kidney transplantation  - s/p DBD Ktx (#3) and PD cath removal   - 5 day lyon (U-U/small bladder). 4 week stent. LILLIAN x 1- ss drainage  - HD POD 1 for hyperkalemia  - making ~400cc urine. Plan for IV lasix today  - cont Ivf      Hyperglycemia  - endocrine consulted for BG management       Failed kidney transplant x2        Anemia in chronic kidney disease, on chronic dialysis  - h/h stable   - monitor with CBC       Benign hypertension with ESRD (end-stage renal disease)  - w history of PRESS 2/2 hypertensive emergency   - monitor closely, add anti-hypertensive agents as needed  *had hypotension post-op requiring midodrine. Will cont midodrine for 24 hrs. Monitor closely for elevated BP            Discharge " Planning: not current candidate   Medical decision making for this encounter includes review of pertinent labs and diagnostic studies, assessment and planning, discussions with consulting providers, discussion with patient/family, and participation in multidisciplinary rounds. Time spent caring for patient: 60 minutes    Bibi Simms PA-C  Kidney Transplant  [facility] - Transplant Stepdown

## 2025-05-08 NOTE — NURSING
205 hours  dialysis treatment completed . No UF removed .Both lumens of  Left  IJ catheter were  flushed , Normal saline locked  and wrapped with tape and gauze .   Report  given to Primary Nurse .

## 2025-05-08 NOTE — PLAN OF CARE
Recommendations  1. Continue Renal- non-HD diet- recommend liberalizing as renal labs improve   2. RD following     Goals: Meet % of EEN/EPN by RD f/u date  Nutrition Goal Status: progressing towards goal  Communication of RD Recs: POC

## 2025-05-08 NOTE — ASSESSMENT & PLAN NOTE
- Continue Cellcept and Prograf. Will monitor for signs of toxic side effects, check daily tacrolimus troughs, and change meds accordingly.      5 (moderate pain)

## 2025-05-08 NOTE — PROGRESS NOTES
Patient off unit via stretcher to US. NS infusing at 75cc/hr. Oxycodone 10mg PO given prior to patient leaving floor - okay per GARCIA. ADORE Simms to administer now; instructed to give 10mg . Orders to be adjusted. SBP >100. Mask on patient.     1315 Patient returned to unit from US. Transferred from stretcher to chair with 1 person assistance.

## 2025-05-08 NOTE — HOSPITAL COURSE
Patient s/p DBD Ktx (#3) and PD catheter removal on 5/7/25. 5 day lyon (U-U.small bladder + 4 week stent) and LILLIAN x 1. Post-op course notable for:  DGF- expected 2/2 CIT. Required HD POD 1 for hyperkalemia. Kidney function not yet clearing. Making minimal urine, not responsive to IV Lasix.Interventional Nephrology consulted for perm cath placement 5/12. Outpatient HD schedule to be set up. Last HD 5/10.  Hypotension in PACU, H/H stable. Midodrine TID for 24 hours with some improvement.   SVT- improved with restarting home Metoprolol (originally held for hypotension post op).     Interval history : NAEON. Lyon removed this am without issue. Perm cath placement for outpt HD needs. SW to set up outpt HD chair. Tentative plan for HD tomorrow. Lyon removed this am without issue. VSS. Cont to monitor.    Drain removed:  Site cleaned with alcohol. Drain intact at time of removal. Steri-strip in place. Patient tolerated procedure well.  Will continue to monitor for any complications.

## 2025-05-08 NOTE — ASSESSMENT & PLAN NOTE
- s/p DBD Ktx (#3) and PD cath removal 5/7  - 5 day lyon (U-U/small bladder). 4 week stent. LILLIAN x 1- ss drainage  - HD POD 1 for hyperkalemia  - making ~400cc urine. Plan for IV lasix today  - cont Ivf

## 2025-05-08 NOTE — PROGRESS NOTES
1:1 bedside HD tx started    Labs and orders verified    Access: LIJ trialysis; arterial lumen with no blood return and slight resistance to flushing; lines reversed; BFR of only 250

## 2025-05-08 NOTE — PROGRESS NOTES
Patient admitted for Kidney transplant.  Transplant coordinator met with the patient & mother on rounds to introduce self and explain the coordinator role.     The post-transplant teaching book was given.   Transplant Coordinator explained that she will follow the patient while in the hospital and assist with discharge.     Transplant Specifics  ESRD 2/2 re-transplant/FSGS  SCD, KDPI 49%, cPRA 100%  Thymo induction, CIT>24hrs  CMV D+,R- (MISMATCH)

## 2025-05-08 NOTE — PROGRESS NOTES
Patient received 3x 250 cc boluses for systolics in the 70s-80s. Anesthesia resident and attending at bedside to assess. Amador P.A. aware and order for midodrine placed. Patient okay to go to the floor with systolics in the 90s per P.A. Amador.

## 2025-05-08 NOTE — ASSESSMENT & PLAN NOTE
- w history of PRESS 2/2 hypertensive emergency   - monitor closely, add anti-hypertensive agents as needed  *had hypotension post-op requiring midodrine. Will cont midodrine for 24 hrs. Monitor closely for elevated BP

## 2025-05-08 NOTE — PROGRESS NOTES
TRANSPLANT NOTE:      ORGAN: LEFT KIDNEY      Disease Etiology: Other, Specify - Retransplant  Donor Type: Donation after Brain Death      CDC High Risk: No      Donor CMV Status:     Donor HBcAB: Negative      Donor HCV Status: Negative      Peak cPRA % (within 1 year of transplant): 100      Anna Gabriel is a 24 y.o. female s/p  Donation after Brain Death     kidney transplant on 5/7/2025 (Kidney) for Other, Specify - Retransplant.   This patient will receive anti-thymocyte globulin 4.5 mg/kg for induction.  This patients maintenance immunosuppression will include a steroid taper per protocol to 5mg daily, tacrolimus, and mycophenolate maintenance.  Opportunistic infection prophylaxis will include valganciclovir for 6 months (CMV D + , R - ) and sulfamethoxazole-trimethoprim for 6 months.  Patient is to begin self medications upon transfer to the TSU, and I plan to meet with this patient and his/her support person prior to discharge to review the medication section of the Kidney Transplant Education Manual.  I have reviewed the pre-op medications and have restarted those, as appropriate.

## 2025-05-08 NOTE — SUBJECTIVE & OBJECTIVE
Subjective:   History of Present Illness:  Ms. Gabriel is a 24 y.o. White female who has ESRD 2/2 FSGS. She has had 2 previous kidney transplants (~ 2015 at [facility], 12/2020 at [facility], [place]). 1st kidney lost to hyper acute rejection and 2nd kidney due to infection. She is on PD since 2021, tolerating well, denies hx peritonitis. PMH also includes: seizure and hx PRES in setting of HTN emergency, decreased bladder capacity seen by urology. Anuric. She presents as a primary candidate for a kidney transplant. Reports being in her usual state of health, denies recent illnesses or hospitalizations. Per urology, needs oxybutynin POD1 and repeat urodynamics outpatient once bladder enlarges again to assess for hostile bladder at larger volumes. Surgery scheduled for 1100 with Dr. Stern. Pre op labs and imaging pending, will be reviewed prior to transplant     Ms. Gabriel is a 24 y.o. year old female who is status post Kidney Transplant - 5/7/2025  (#1).  Her maintenance immunosuppression consists of:   Immunosuppressants (From admission, onward)      Start     Stop Route Frequency Ordered    05/08/25 0900  antithymocyte globulin (rabbit) 125 mg, hydrocortisone sodium succinate (SOLU-CORTEF) 20 mg in 0.9% NaCl 500 mL (FOR PERIPHERAL LINE ADMINISTRATION ONLY)  (IP TXP KIDNEY POST-OP THYMO WITH PERIPHERAL PRECHECKED IBW)         05/09/25 0859 IV Daily 05/07/25 1807 05/07/25 2200  mycophenolate capsule 1,000 mg  (IP TXP KIDNEY POST-OP THYMO WITH PERIPHERAL PRECHECKED IBW)         -- Oral 2 times daily 05/07/25 1807 05/07/25 1815  tacrolimus capsule 3 mg  (IP TXP KIDNEY POST-OP THYMO WITH PERIPHERAL PRECHECKED IBW)         -- Oral 2 times daily 05/07/25 1807            Hospital Course:  Patient s/p DBD Ktx (#3) and PD catheter removal on 5/7/25. 5 day lyon (U-U.small bladder + 4 week stent) and LILLIAN x 1. Patient with hyperkalemia overnight requiring HD this am. Making some urine. Plan for IV Lasix today. BP low in PACU, H/H  stable. No overt signs of bleeding. SBP remains 90s-100s. Will start Midodrine TID x 24 hrs to monitor BP. Pt reports generalized abd pain this am. Tolerating diet. Encouraged ambulation. Cont IVF. Monitor.       Past Medical, Surgical, Family, and Social History:   Unchanged from H&P.    Scheduled Meds:   acetaminophen  650 mg Oral Q8H    acetaminophen  650 mg Oral Q24H    antithymocyte globulin (rabbit) 125 mg, hydrocortisone sodium succinate (SOLU-CORTEF) 20 mg in 0.9% NaCl 500 mL (FOR PERIPHERAL LINE ADMINISTRATION ONLY)  2.25 mg/kg (Ideal) Intravenous Daily    bisacodyL  10 mg Oral QHS    diphenhydrAMINE  25 mg Oral Q24H    docusate sodium  100 mg Oral TID    famotidine  20 mg Oral QHS    furosemide (LASIX) injection  60 mg Intravenous Once    heparin (porcine)  5,000 Units Subcutaneous Q8H    methylPREDNISolone injection (PEDS and ADULTS)  250 mg Intravenous Once    midodrine  5 mg Oral TID    multivitamin  1 tablet Oral Daily    mupirocin  1 g Nasal BID    mycophenolate  1,000 mg Oral BID    oxybutynin  5 mg Oral TID    [START ON 5/9/2025] predniSONE  20 mg Oral Daily    sevelamer carbonate  800 mg Oral TID WM    [START ON 5/17/2025] sulfamethoxazole-trimethoprim 400-80mg  1 tablet Oral Daily AM    tacrolimus  3 mg Oral BID    [START ON 5/17/2025] valGANciclovir  450 mg Oral Daily AM     Continuous Infusions:   0.9% NaCl   Intravenous Continuous        glucagon (human recombinant)  1 mg Intramuscular Continuous PRN         PRN Meds:  Current Facility-Administered Medications:     dextrose 50%, 12.5 g, Intravenous, PRN    dextrose 50%, 12.5 g, Intravenous, PRN    dextrose 50%, 25 g, Intravenous, PRN    [COMPLETED] dextrose 50%, 50 g, Intravenous, Once **AND** dextrose 50%, 25 g, Intravenous, PRN **AND** [COMPLETED] insulin regular, 0.1 Units/kg, Intravenous, Once    diphenhydrAMINE, 50 mg, Oral, Once PRN    EPINEPHrine (PF), 1 mg, Subcutaneous, Once PRN    glucagon (human recombinant), 1 mg, Intramuscular,  PRN    glucagon (human recombinant), 1 mg, Intramuscular, Continuous PRN    glucose, 16 g, Oral, PRN    glucose, 16 g, Oral, PRN    glucose, 16 g, Oral, PRN    glucose, 24 g, Oral, PRN    glucose, 24 g, Oral, PRN    hydrocortisone sodium succinate, 100 mg, Intravenous, Once PRN    insulin aspart U-100, 0-5 Units, Subcutaneous, AC + HS + 0200 PRN    insulin aspart U-100, 0-5 Units, Subcutaneous, QID (AC + HS) PRN    ondansetron, 4 mg, Intravenous, Q6H PRN    oxyCODONE, 5 mg, Oral, Q6H PRN    sodium chloride 0.9%, 10 mL, Intravenous, PRN    traMADoL, 50 mg, Oral, Q6H PRN    Intake/Output - Last 3 Shifts         05/06 0700  05/07 0659 05/07 0700 05/08 0659 05/08 0700  05/09 0659    P.O.  120     Other   300    IV Piggyback  2450.9     Total Intake(mL/kg)  2570.9 (42.6) 300 (5)    Urine (mL/kg/hr)  480 (0.3) 10 (0)    Emesis/NG output  0     Drains  30     Other   500    Stool  0     Blood  250     Total Output  760 510    Net  +1810.9 -210           Unmeasured Urine Occurrence  0 x     Unmeasured Stool Occurrence  0 x     Unmeasured Emesis Occurrence  0 x              Review of Systems   Constitutional:  Negative for activity change, appetite change and fever.   Respiratory:  Negative for cough and shortness of breath.    Cardiovascular:  Negative for chest pain and leg swelling.   Gastrointestinal:  Positive for abdominal pain. Negative for abdominal distention, diarrhea, nausea and vomiting.   Genitourinary:  Positive for decreased urine volume. Negative for difficulty urinating.   Skin:  Negative for wound.   Allergic/Immunologic: Positive for immunocompromised state.   Neurological:  Negative for dizziness and weakness.      Objective:     Vital Signs (Most Recent):  Temp: 98.2 °F (36.8 °C) (05/08/25 0828)  Pulse: 88 (05/08/25 0935)  Resp: 20 (05/08/25 0935)  BP: (!) 94/54 (05/08/25 0935)  SpO2: 95 % (05/08/25 0935) Vital Signs (24h Range):  Temp:  [97.3 °F (36.3 °C)-98.2 °F (36.8 °C)] 98.2 °F (36.8 °C)  Pulse:   "[61-92] 88  Resp:  [13-27] 20  SpO2:  [83 %-100 %] 95 %  BP: ()/(40-71) 94/54     Weight: 60.4 kg (133 lb 2.5 oz)  Height: 5' 2" (157.5 cm)  Body mass index is 24.35 kg/m².     Physical Exam  Vitals reviewed.   Constitutional:       Appearance: She is well-developed.   Eyes:      Pupils: Pupils are equal, round, and reactive to light.   Cardiovascular:      Rate and Rhythm: Normal rate and regular rhythm.      Heart sounds: No murmur heard.  Pulmonary:      Effort: Pulmonary effort is normal. No respiratory distress.      Breath sounds: Normal breath sounds. No wheezing.   Abdominal:      General: Bowel sounds are normal. There is no distension.      Palpations: Abdomen is soft.      Tenderness: There is abdominal tenderness. There is no guarding.      Comments: Midline incision with dermabond  LILLIAN x 1- ss drainage   Musculoskeletal:         General: Normal range of motion.      Cervical back: Normal range of motion.   Skin:     General: Skin is warm and dry.   Neurological:      Mental Status: She is alert and oriented to person, place, and time.   Psychiatric:         Mood and Affect: Mood normal.         Behavior: Behavior normal.         Thought Content: Thought content normal.         Judgment: Judgment normal.          Laboratory:  CBC:   Recent Labs   Lab 05/07/25  0430 05/07/25  1227 05/07/25 1811 05/07/25 2146 05/08/25  0502   WBC 7.71  --   --  11.93 9.76   RBC 3.05*  --   --  2.96* 2.78*   HGB 9.3*  --   --  9.3* 8.6*   HCT 30.4*   < > 29.5* 30.0* 28.5*     --   --  271 225   *  --   --  101* 103*   MCH 30.5  --   --  31.4* 30.9   MCHC 30.6*  --   --  31.0* 30.2*    < > = values in this interval not displayed.     CMP:   Recent Labs   Lab 05/07/25  0430 05/07/25  1811 05/07/25  2146 05/08/25  0104 05/08/25  0502   GLU 93 139* 150* 144* 257*   CALCIUM 7.9* 6.7* 7.2* 7.5* 7.5*   ALBUMIN 2.5* 2.0*  --   --  2.0*   PROT 5.7*  --   --   --   --     136 136 137 135*   K 3.8 4.8 5.4* " 5.9* 5.0   CO2 26 20* 21* 21* 21*   CL 98 101 102 105 103   BUN 45* 44* 45* 48* 49*   CREATININE 10.3* 9.7* 9.6* 9.5* 9.6*   ALKPHOS 189*  --   --   --   --    ALT 16  --   --   --   --    AST 16  --   --   --   --      Labs within the past 24 hours have been reviewed.    Diagnostic Results:  Intra-op US reviewed by surgeon

## 2025-05-08 NOTE — CONSULTS
[facility] - Transplant Stepdown  Adult Nutrition  Consult Note    SUMMARY     Recommendations  1. Continue Renal- non-HD diet- recommend liberalizing as renal labs improve   2. RD following    Goals: Meet % of EEN/EPN by RD f/u date  Nutrition Goal Status: progressing towards goal  Communication of RD Recs: POC    Nutrition Discharge Planning     Nutrition Discharge Planning: Post transplant nutrition education provided. Food safety/drug interactions emphasized. General healthy diet recommended. RD name/contact information, education material left.  No other needs identified. Caregiver present.    Assessment and Plan    Nutrition Problem  Increased nutrient needs (protein, calorie)     Related to (etiology):  Increased metabolic stress associated with transplant     Signs and Symptoms (as evidenced by):  S/p kidney transplantation and presence of surgical wound     Interventions(treatment strategy):  Collaboration of nutrition care w/ other providers     Nutrition Diagnosis Status:  New    Reason for Assessment    Reason For Assessment: consult  Diagnosis: other (see comments) (FSGS)  General Information Comments: RD consulted for assessment of nutritional needs post kidney transplant. Ms. Gabriel is a 24 y.o. White female who has ESRD 2/2 FSGS. She has had 2 previous kidney transplants (~ 2015 at [facility], 12/2020 at [facility], [place]). 1st kidney lost to hyper acute rejection and 2nd kidney due to infection. She is on PD since 2021, tolerating well, denies hx peritonitis. Spoke w/ pt at bedside, reports a good appetite currently and PTA. No n/v. UBW of 130 lbs. Per chart review 7.36% x 3 months. (Reports fluid shifts). Pt w/ no indicators for malnutrition at this time. RD following. LBM noted-5/6  Nutrition Related Social Determinants of Health: SDOH: None Identified      Nutrition/Diet History    Spiritual, Cultural Beliefs, Catholic Practices, Values that Affect Care: other (see comments)  Food Allergies:  "[facility]    Anthropometrics    Height: 5' 2" (157.5 cm)  Height (inches): 62 in  Height Method: Stated  Weight: 60.4 kg (133 lb 2.5 oz)  Weight (lb): 133.16 lb  Weight Method: Bed Scale  Ideal Body Weight (IBW), Female: 110 lb  % Ideal Body Weight, Female (lb): 118.18 %  BMI (Calculated): 24.3  BMI Grade: 18.5-24.9 - normal       Lab/Procedures/Meds    Pertinent Labs Reviewed: reviewed  Pertinent Labs Comments: BUN 49, Glucose 257  Pertinent Medications Reviewed: reviewed  Pertinent Medications Comments: tacrolimus    Estimated/Assessed Needs    Weight Used For Calorie Calculations: 60.4 kg (133 lb 2.5 oz)  Energy Calorie Requirements (kcal): 5735-9318  Energy Need Method: Kcal/kg (25-35 kcal/kg)  Protein Requirements: 72 g (1.2 g/kg)  Weight Used For Protein Calculations: 60.4 kg (133 lb 2.5 oz)   RDA Method (mL): 1510     Nutrition Prescription Ordered    Current Diet Order: Renal, non-dialysis diet    Evaluation of Received Nutrient/Fluid Intake    I/O: -  Energy Calories Required: not meeting needs  Protein Required: not meeting needs  Fluid Required: not meeting needs  Total Fluid Intake (mL/kg): 1 ml or fluid per MD  Tolerance: tolerating  % Intake of Estimated Energy Needs: 0-50%  % Meal Intake: 0-50%    Nutrition Risk    Level of Risk/Frequency of Follow-up: low ((2x/week))     Monitor and Evaluation    Monitor and Evaluation: Food and beverage intake, Energy intake, Carbohydrate intake, Protein intake, Diet order       Nutrition Follow-Up    RD Follow-up?: Yes    "

## 2025-05-08 NOTE — PLAN OF CARE
Patient POD#1 - HD done at bedside this AM for hyperkalemia - no UF removed. Repeat CBC/renal panel done - K 4.2. Kidney US done - mildly elevated RIs noted; results otherwise satisfactory. Roman with scant clear yellow UOP. Lasix 60mg IV given x1 with no response. IVF infusing at 75cc/hr - d/c'd at 1800. Pain regimen adjusted - Tramadol d/c'd; oxycodone dose increased to 5mg/10mg Q4H. OOB to chair with minimal assistance. PO intake encouraged; minimal appetite today. Thymo #2 infusing - tolerating without difficulty.     1800 Self-meds introduced to patient and mother. Patient sleepy after Benadryl (Thymo pre-med) and oxycodone - will reinforce teaching with patient as needed. Patient/mother to start pulling self-medications tonight.     IS provided. Patient correctly able to demonstrate how to use.

## 2025-05-09 ENCOUNTER — TELEPHONE (OUTPATIENT)
Dept: TRANSPLANT | Facility: CLINIC | Age: 25
End: 2025-05-09
Payer: MEDICARE

## 2025-05-09 ENCOUNTER — APPOINTMENT (OUTPATIENT)
Dept: CARDIOLOGY | Facility: HOSPITAL | Age: 25
End: 2025-05-09
Payer: MEDICARE

## 2025-05-09 LAB
ABSOLUTE EOSINOPHIL (OHS): 0 K/UL
ABSOLUTE MONOCYTE (OHS): 0.5 K/UL (ref 0.3–1)
ABSOLUTE NEUTROPHIL COUNT (OHS): 6.08 K/UL (ref 1.8–7.7)
ALBUMIN SERPL BCP-MCNC: 2.1 G/DL (ref 3.5–5.2)
ANION GAP (OHS): 11 MMOL/L (ref 8–16)
AORTIC SIZE INDEX: 2.1 CM/M2
ASCENDING AORTA: 3.4 CM
AV AREA BY CONTINUOUS VTI: 3.1 CM2
AV INDEX (PROSTH): 0.78
AV LVOT MEAN GRADIENT: 3 MMHG
AV LVOT PEAK GRADIENT: 5 MMHG
AV MEAN GRADIENT: 4 MMHG
AV PEAK GRADIENT: 7 MMHG
AV VALVE AREA BY VELOCITY RATIO: 2.9 CM²
AV VALVE AREA: 3 CM²
AV VELOCITY RATIO: 0.77
BASOPHILS # BLD AUTO: 0.01 K/UL
BASOPHILS NFR BLD AUTO: 0.1 %
BSA FOR ECHO PROCEDURE: 1.61 M2
BUN SERPL-MCNC: 32 MG/DL (ref 6–20)
CALCIUM SERPL-MCNC: 8.4 MG/DL (ref 8.7–10.5)
CHLORIDE SERPL-SCNC: 102 MMOL/L (ref 95–110)
CO2 SERPL-SCNC: 24 MMOL/L (ref 23–29)
CREAT SERPL-MCNC: 6.5 MG/DL (ref 0.5–1.4)
CREAT UR-MCNC: 86 MG/DL (ref 15–325)
CV ECHO LV RWT: 0.36 CM
DOP CALC AO PEAK VEL: 1.3 M/S
DOP CALC AO VTI: 29.7 CM
DOP CALC LVOT AREA: 3.8 CM2
DOP CALC LVOT DIAMETER: 2.2 CM
DOP CALC LVOT PEAK VEL: 1 M/S
DOP CALC LVOT STROKE VOLUME: 88.5 CM3
DOP CALCLVOT PEAK VEL VTI: 23.3 CM
E WAVE DECELERATION TIME: 141 MS
E/A RATIO: 1.05
E/E' RATIO: 10 M/S
ECHO EF ESTIMATED: 63 %
ECHO LV POSTERIOR WALL: 0.9 CM (ref 0.6–1.1)
EJECTION FRACTION: 65 %
ERYTHROCYTE [DISTWIDTH] IN BLOOD BY AUTOMATED COUNT: 16.3 % (ref 11.5–14.5)
FRACTIONAL SHORTENING: 34 % (ref 28–44)
GFR SERPLBLD CREATININE-BSD FMLA CKD-EPI: 9 ML/MIN/1.73/M2
GLUCOSE SERPL-MCNC: 109 MG/DL (ref 70–110)
HCT VFR BLD AUTO: 26.3 % (ref 37–48.5)
HGB BLD-MCNC: 8 GM/DL (ref 12–16)
IMM GRANULOCYTES # BLD AUTO: 0.02 K/UL (ref 0–0.04)
IMM GRANULOCYTES NFR BLD AUTO: 0.3 % (ref 0–0.5)
INTERVENTRICULAR SEPTUM: 0.8 CM (ref 0.6–1.1)
IVRT: 76 MS
LA MAJOR: 7 CM
LA MINOR: 5.9 CM
LA WIDTH: 3.5 CM
LEFT ATRIUM SIZE: 3.8 CM
LEFT ATRIUM VOLUME INDEX MOD: 42 ML/M2
LEFT ATRIUM VOLUME INDEX: 45 ML/M2
LEFT ATRIUM VOLUME MOD: 68 ML
LEFT ATRIUM VOLUME: 72 CM3
LEFT INTERNAL DIMENSION IN SYSTOLE: 3.3 CM (ref 2.1–4)
LEFT VENTRICLE DIASTOLIC VOLUME INDEX: 72.67 ML/M2
LEFT VENTRICLE DIASTOLIC VOLUME: 117 ML
LEFT VENTRICLE MASS INDEX: 91.2 G/M2
LEFT VENTRICLE SYSTOLIC VOLUME INDEX: 26.7 ML/M2
LEFT VENTRICLE SYSTOLIC VOLUME: 43 ML
LEFT VENTRICULAR INTERNAL DIMENSION IN DIASTOLE: 5 CM (ref 3.5–6)
LEFT VENTRICULAR MASS: 146.8 G
LV LATERAL E/E' RATIO: 8.3
LV SEPTAL E/E' RATIO: 14.1
LYMPHOCYTES # BLD AUTO: 0.15 K/UL (ref 1–4.8)
MAGNESIUM SERPL-MCNC: 2.2 MG/DL (ref 1.6–2.6)
MCH RBC QN AUTO: 31 PG (ref 27–31)
MCHC RBC AUTO-ENTMCNC: 30.4 G/DL (ref 32–36)
MCV RBC AUTO: 102 FL (ref 82–98)
MV PEAK A VEL: 0.94 M/S
MV PEAK E VEL: 0.99 M/S
NUCLEATED RBC (/100WBC) (OHS): 0 /100 WBC
OHS CV RV/LV RATIO: 0.72 CM
PHOSPHATE SERPL-MCNC: 9 MG/DL (ref 2.7–4.5)
PISA TR MAX VEL: 2.3 M/S
PLATELET # BLD AUTO: 237 K/UL (ref 150–450)
PMV BLD AUTO: 8.7 FL (ref 9.2–12.9)
POCT GLUCOSE: 114 MG/DL (ref 70–110)
POCT GLUCOSE: 122 MG/DL (ref 70–110)
POCT GLUCOSE: 132 MG/DL (ref 70–110)
POTASSIUM SERPL-SCNC: 5 MMOL/L (ref 3.5–5.1)
PROT UR-MCNC: 266 MG/DL
PROT/CREAT UR: 3.09 MG/G{CREAT}
RA MAJOR: 5.79 CM
RA PRESSURE ESTIMATED: 3 MMHG
RA WIDTH: 4.41 CM
RBC # BLD AUTO: 2.58 M/UL (ref 4–5.4)
RELATIVE EOSINOPHIL (OHS): 0 %
RELATIVE LYMPHOCYTE (OHS): 2.2 % (ref 18–48)
RELATIVE MONOCYTE (OHS): 7.4 % (ref 4–15)
RELATIVE NEUTROPHIL (OHS): 90 % (ref 38–73)
RIGHT VENTRICLE DIASTOLIC BASEL DIMENSION: 3.6 CM
RV TB RVSP: 5 MMHG
RV TISSUE DOPPLER FREE WALL SYSTOLIC VELOCITY 1 (APICAL 4 CHAMBER VIEW): 12.02 CM/S
SINUS: 3.32 CM
SODIUM SERPL-SCNC: 137 MMOL/L (ref 136–145)
STJ: 2.7 CM
TACROLIMUS BLD-MCNC: 4.2 NG/ML (ref 5–15)
TDI LATERAL: 0.12 M/S
TDI SEPTAL: 0.07 M/S
TDI: 0.1 M/S
TRICUSPID ANNULAR PLANE SYSTOLIC EXCURSION: 2.1 CM
TV PEAK GRADIENT: 21 MMHG
TV REST PULMONARY ARTERY PRESSURE: 24 MMHG
W HEPATITIS B SURFACE ANTIBODY, QUALITATIVE: POSITIVE
W HEPATITIS B SURFACE ANTIBODY, QUANTITATIVE: 471 MIU/ML
WBC # BLD AUTO: 6.76 K/UL (ref 3.9–12.7)
Z-SCORE OF LEFT VENTRICULAR DIMENSION IN END DIASTOLE: 0.9
Z-SCORE OF LEFT VENTRICULAR DIMENSION IN END SYSTOLE: 1.21

## 2025-05-09 PROCEDURE — 82570 ASSAY OF URINE CREATININE: CPT | Performed by: PHYSICIAN ASSISTANT

## 2025-05-09 PROCEDURE — 80069 RENAL FUNCTION PANEL: CPT | Performed by: STUDENT IN AN ORGANIZED HEALTH CARE EDUCATION/TRAINING PROGRAM

## 2025-05-09 PROCEDURE — 25000003 PHARM REV CODE 250: Performed by: STUDENT IN AN ORGANIZED HEALTH CARE EDUCATION/TRAINING PROGRAM

## 2025-05-09 PROCEDURE — 99233 SBSQ HOSP IP/OBS HIGH 50: CPT | Mod: 24,,, | Performed by: PHYSICIAN ASSISTANT

## 2025-05-09 PROCEDURE — 85025 COMPLETE CBC W/AUTO DIFF WBC: CPT | Performed by: STUDENT IN AN ORGANIZED HEALTH CARE EDUCATION/TRAINING PROGRAM

## 2025-05-09 PROCEDURE — 93306 TTE W/DOPPLER COMPLETE: CPT

## 2025-05-09 PROCEDURE — 25000003 PHARM REV CODE 250: Performed by: TRANSPLANT SURGERY

## 2025-05-09 PROCEDURE — 80197 ASSAY OF TACROLIMUS: CPT | Performed by: STUDENT IN AN ORGANIZED HEALTH CARE EDUCATION/TRAINING PROGRAM

## 2025-05-09 PROCEDURE — 27000207 HC ISOLATION

## 2025-05-09 PROCEDURE — 63600175 PHARM REV CODE 636 W HCPCS: Performed by: STUDENT IN AN ORGANIZED HEALTH CARE EDUCATION/TRAINING PROGRAM

## 2025-05-09 PROCEDURE — 83735 ASSAY OF MAGNESIUM: CPT | Performed by: STUDENT IN AN ORGANIZED HEALTH CARE EDUCATION/TRAINING PROGRAM

## 2025-05-09 PROCEDURE — 25000003 PHARM REV CODE 250: Performed by: PHYSICIAN ASSISTANT

## 2025-05-09 PROCEDURE — 20600001 HC STEP DOWN PRIVATE ROOM

## 2025-05-09 RX ORDER — SIMETHICONE 80 MG
1 TABLET,CHEWABLE ORAL 3 TIMES DAILY PRN
Status: DISCONTINUED | OUTPATIENT
Start: 2025-05-09 | End: 2025-05-13 | Stop reason: HOSPADM

## 2025-05-09 RX ORDER — SEVELAMER CARBONATE 800 MG/1
2400 TABLET, FILM COATED ORAL
Status: DISCONTINUED | OUTPATIENT
Start: 2025-05-09 | End: 2025-05-13

## 2025-05-09 RX ORDER — SODIUM CHLORIDE 9 MG/ML
INJECTION, SOLUTION INTRAVENOUS CONTINUOUS
OUTPATIENT
Start: 2025-05-09 | End: 2025-05-09

## 2025-05-09 RX ADMIN — OXYCODONE HYDROCHLORIDE 10 MG: 10 TABLET ORAL at 06:05

## 2025-05-09 RX ADMIN — DOCUSATE SODIUM 100 MG: 100 CAPSULE, LIQUID FILLED ORAL at 08:05

## 2025-05-09 RX ADMIN — MYCOPHENOLATE MOFETIL 1000 MG: 250 CAPSULE ORAL at 08:05

## 2025-05-09 RX ADMIN — OXYBUTYNIN CHLORIDE 5 MG: 5 TABLET ORAL at 02:05

## 2025-05-09 RX ADMIN — OXYBUTYNIN CHLORIDE 5 MG: 5 TABLET ORAL at 08:05

## 2025-05-09 RX ADMIN — PREDNISONE 20 MG: 20 TABLET ORAL at 08:05

## 2025-05-09 RX ADMIN — SEVELAMER CARBONATE 2400 MG: 800 TABLET, FILM COATED ORAL at 05:05

## 2025-05-09 RX ADMIN — MUPIROCIN 1 G: 20 OINTMENT TOPICAL at 08:05

## 2025-05-09 RX ADMIN — ACETAMINOPHEN 650 MG: 325 TABLET ORAL at 05:05

## 2025-05-09 RX ADMIN — TACROLIMUS 3 MG: 1 CAPSULE ORAL at 08:05

## 2025-05-09 RX ADMIN — HEPARIN SODIUM 5000 UNITS: 5000 INJECTION INTRAVENOUS; SUBCUTANEOUS at 08:05

## 2025-05-09 RX ADMIN — THERA TABS 1 TABLET: TAB at 08:05

## 2025-05-09 RX ADMIN — TACROLIMUS 3 MG: 1 CAPSULE ORAL at 05:05

## 2025-05-09 RX ADMIN — SIMETHICONE 80 MG: 80 TABLET, CHEWABLE ORAL at 04:05

## 2025-05-09 RX ADMIN — FAMOTIDINE 20 MG: 20 TABLET, FILM COATED ORAL at 08:05

## 2025-05-09 RX ADMIN — ACETAMINOPHEN 650 MG: 325 TABLET ORAL at 02:05

## 2025-05-09 RX ADMIN — HEPARIN SODIUM 5000 UNITS: 5000 INJECTION INTRAVENOUS; SUBCUTANEOUS at 05:05

## 2025-05-09 RX ADMIN — HEPARIN SODIUM 5000 UNITS: 5000 INJECTION INTRAVENOUS; SUBCUTANEOUS at 02:05

## 2025-05-09 RX ADMIN — DOCUSATE SODIUM 100 MG: 100 CAPSULE, LIQUID FILLED ORAL at 02:05

## 2025-05-09 RX ADMIN — SEVELAMER CARBONATE 800 MG: 800 TABLET, FILM COATED ORAL at 08:05

## 2025-05-09 RX ADMIN — SEVELAMER CARBONATE 2400 MG: 800 TABLET, FILM COATED ORAL at 12:05

## 2025-05-09 RX ADMIN — BISACODYL 10 MG: 5 TABLET, COATED ORAL at 08:05

## 2025-05-09 NOTE — PLAN OF CARE
Patient AAOx4. VSS on RA. Afebrile. NSR on Tele. Pt denied need for pain med this shift. Thymo completed this shift w/o issues o/n. Midline inc anushka w/ dermabond- free from signs of infection. RLQ LILLIAN drain in place- minimal seroussang output noted. Roman in place- CAUTI precautions maintained. UOP scant- team aware. No BM/gas reported this shift- see I&O for details. Up w/ assist. Remained in bed this shift d/t anaesthesia. Bed locked and lowered, call bell in reach, nonskid socks on when OOB. Reviewed plan of care and fall precautions w/ pt- pt verbalized understanding. Reminded to call for assistance. Standard precautions maintained. Mother at bedside. Self meds not pulled for this shift. RN reminded pt and family to attempt to pull meds for oncoming shift.

## 2025-05-09 NOTE — CONSULTS
IR consult received for TDC placement. Interventional nephrology is on service for TDC placement today, not IR. Informed primary provider via secure chat.    Yasmin Hatfield PA-C  Interventional Radiology   Spectra: [phone]

## 2025-05-09 NOTE — SUBJECTIVE & OBJECTIVE
Subjective:   History of Present Illness:  Ms. Gabriel is a 24 y.o. White female who has ESRD 2/2 FSGS. She has had 2 previous kidney transplants (~ 2015 at [facility], 12/2020 at [facility], [place]). 1st kidney lost to hyper acute rejection and 2nd kidney due to infection. She is on PD since 2021, tolerating well, denies hx peritonitis. PMH also includes: seizure and hx PRES in setting of HTN emergency, decreased bladder capacity seen by urology. Anuric. She presents as a primary candidate for a kidney transplant. Reports being in her usual state of health, denies recent illnesses or hospitalizations. Per urology, needs oxybutynin POD1 and repeat urodynamics outpatient once bladder enlarges again to assess for hostile bladder at larger volumes. Surgery scheduled for 1100 with Dr. Stern. Pre op labs and imaging pending, will be reviewed prior to transplant     Ms. Gabriel is a 24 y.o. year old female who is status post Kidney Transplant - 5/7/2025  (#1).  Her maintenance immunosuppression consists of:   Immunosuppressants (From admission, onward)      Start     Stop Route Frequency Ordered    05/07/25 2200  mycophenolate capsule 1,000 mg  (IP TXP KIDNEY POST-OP THYMO WITH PERIPHERAL PRECHECKED IBW)         -- Oral 2 times daily 05/07/25 1807 05/07/25 1815  tacrolimus capsule 3 mg  (IP TXP KIDNEY POST-OP THYMO WITH PERIPHERAL PRECHECKED IBW)         -- Oral 2 times daily 05/07/25 1807          Hospital Course:  Patient s/p DBD Ktx (#3) and PD catheter removal on 5/7/25. 5 day lyon (U-U.small bladder + 4 week stent) and LILLIAN x 1. Patient with hyperkalemia POD 1 requiring HD. UOP dropping off, non responsive to IV lasix. Hold off on HD today. Will reassess tomorrow. Interventional nephrology consulted for perm cath placement for Monday 5/12. Hypotension in PACU, H/H stable. Midodrine TID for 24 hours with some improvement. D/c midodrine today and will monitor BP. TTE ordered to assess. Tolerating diet. Encouraged ambulation.  Cont IVF. Monitor.     Past Medical, Surgical, Family, and Social History:   Unchanged from H&P.    Scheduled Meds:   acetaminophen  650 mg Oral Q8H    bisacodyL  10 mg Oral QHS    docusate sodium  100 mg Oral TID    famotidine  20 mg Oral QHS    heparin (porcine)  5,000 Units Subcutaneous Q8H    midodrine  5 mg Oral TID    multivitamin  1 tablet Oral Daily    mupirocin  1 g Nasal BID    mycophenolate  1,000 mg Oral BID    oxybutynin  5 mg Oral TID    predniSONE  20 mg Oral Daily    sevelamer carbonate  2,400 mg Oral TID WM    [START ON 5/17/2025] sulfamethoxazole-trimethoprim 400-80mg  1 tablet Oral Daily AM    tacrolimus  3 mg Oral BID    [START ON 5/17/2025] valGANciclovir  450 mg Oral Daily AM     Continuous Infusions:   glucagon (human recombinant)  1 mg Intramuscular Continuous PRN         PRN Meds:  Current Facility-Administered Medications:     aluminum-magnesium hydroxide-simethicone, 30 mL, Oral, Q6H PRN    calcium carbonate, 500 mg, Oral, TID PRN    dextrose 50%, 12.5 g, Intravenous, PRN    dextrose 50%, 12.5 g, Intravenous, PRN    dextrose 50%, 25 g, Intravenous, PRN    diphenhydrAMINE, 50 mg, Oral, Once PRN    EPINEPHrine (PF), 1 mg, Subcutaneous, Once PRN    glucagon (human recombinant), 1 mg, Intramuscular, PRN    glucagon (human recombinant), 1 mg, Intramuscular, Continuous PRN    glucose, 16 g, Oral, PRN    glucose, 16 g, Oral, PRN    glucose, 16 g, Oral, PRN    glucose, 24 g, Oral, PRN    glucose, 24 g, Oral, PRN    hydrocortisone sodium succinate, 100 mg, Intravenous, Once PRN    insulin aspart U-100, 0-5 Units, Subcutaneous, AC + HS + 0200 PRN    insulin aspart U-100, 0-5 Units, Subcutaneous, QID (AC + HS) PRN    ondansetron, 4 mg, Intravenous, Q6H PRN    oxyCODONE, 5 mg, Oral, Q4H PRN    oxyCODONE, 10 mg, Oral, Q4H PRN    sodium chloride 0.9%, 10 mL, Intravenous, PRN    Intake/Output - Last 3 Shifts         05/07 0700  05/08 0659 05/08 0700  05/09 0659 05/09 0700  05/10 0659    P.O. 120 660      "Other  300     IV Piggyback 2450.9 471.7     Total Intake(mL/kg) 2570.9 (42.6) 1431.7 (23.6)     Urine (mL/kg/hr) 480 (0.3) 45 (0)     Emesis/NG output 0      Drains 30 180 40    Other  500     Stool 0 0     Blood 250      Total Output 760 725 40    Net +1810.9 +706.7 -40           Unmeasured Urine Occurrence 0 x 0 x     Unmeasured Stool Occurrence 0 x 0 x     Unmeasured Emesis Occurrence 0 x               Review of Systems   Constitutional:  Negative for activity change, appetite change and fever.   Respiratory:  Negative for cough and shortness of breath.    Cardiovascular:  Negative for chest pain and leg swelling.   Gastrointestinal:  Positive for abdominal pain. Negative for abdominal distention, diarrhea, nausea and vomiting.   Genitourinary:  Positive for decreased urine volume. Negative for difficulty urinating.   Skin:  Negative for wound.   Allergic/Immunologic: Positive for immunocompromised state.   Neurological:  Negative for dizziness and weakness.      Objective:     Vital Signs (Most Recent):  Temp: 97.7 °F (36.5 °C) (05/09/25 1133)  Pulse: 86 (05/09/25 1133)  Resp: 18 (05/09/25 0735)  BP: 114/78 (05/09/25 1133)  SpO2: (!) 90 % (05/09/25 1133) Vital Signs (24h Range):  Temp:  [97.5 °F (36.4 °C)-98.9 °F (37.2 °C)] 97.7 °F (36.5 °C)  Pulse:  [61-99] 86  Resp:  [18] 18  SpO2:  [88 %-98 %] 90 %  BP: ()/(52-87) 114/78     Weight: 60.7 kg (133 lb 13.1 oz)  Height: 5' 2" (157.5 cm)  Body mass index is 24.48 kg/m².     Physical Exam  Vitals reviewed.   Constitutional:       Appearance: She is well-developed.   Eyes:      Pupils: Pupils are equal, round, and reactive to light.   Cardiovascular:      Rate and Rhythm: Normal rate and regular rhythm.      Heart sounds: No murmur heard.  Pulmonary:      Effort: Pulmonary effort is normal. No respiratory distress.      Breath sounds: Normal breath sounds. No wheezing.   Abdominal:      General: Bowel sounds are normal. There is no distension.      " Palpations: Abdomen is soft.      Tenderness: There is abdominal tenderness. There is no guarding.      Comments: Midline incision with dermabond  LILLIAN x 1- ss drainage   Musculoskeletal:         General: Normal range of motion.      Cervical back: Normal range of motion.   Skin:     General: Skin is warm and dry.   Neurological:      Mental Status: She is alert and oriented to person, place, and time.   Psychiatric:         Mood and Affect: Mood normal.         Behavior: Behavior normal.         Thought Content: Thought content normal.         Judgment: Judgment normal.          Laboratory:  CBC:   Recent Labs   Lab 05/08/25  0502 05/08/25  1447 05/09/25  0550   WBC 9.76 9.96 6.76   RBC 2.78* 2.69* 2.58*   HGB 8.6* 8.4* 8.0*   HCT 28.5* 27.2* 26.3*    255 237   * 101* 102*   MCH 30.9 31.2* 31.0   MCHC 30.2* 30.9* 30.4*     CMP:   Recent Labs   Lab 05/07/25  0430 05/07/25  1811 05/08/25  0502 05/08/25  1447 05/09/25  0550   GLU 93   < > 257* 109 109   CALCIUM 7.9*   < > 7.5* 8.3* 8.4*   ALBUMIN 2.5*   < > 2.0* 2.2* 2.1*   PROT 5.7*  --   --   --   --       < > 135* 134* 137   K 3.8   < > 5.0 4.2 5.0   CO2 26   < > 21* 26 24   CL 98   < > 103 99 102   BUN 45*   < > 49* 22* 32*   CREATININE 10.3*   < > 9.6* 5.2* 6.5*   ALKPHOS 189*  --   --   --   --    ALT 16  --   --   --   --    AST 16  --   --   --   --     < > = values in this interval not displayed.     Labs within the past 24 hours have been reviewed.    Diagnostic Results:  None

## 2025-05-09 NOTE — CARE UPDATE
-Glucose Goal 140-180    -A1C:   Hemoglobin A1C   Date Value Ref Range Status   04/09/2025 4.3 4.2 - 6.0 % Final     Comment:     For evaluation of glycemic control in known diabetic non-pregnant adults:      A1C <7% lower or higher target A1C values maybe appropriate for individual patients.  For the diagnosis of diabetes mellitus: A1C >/=6.5%  Increased risk for diabetes mellitus:   A1C 5.7-6.4%         -HOME REGIMEN: No hx of DM     -GLUCOSE TREND FOR THE PAST 24HRS:   Recent Labs   Lab 05/08/25  0406 05/08/25  0857 05/08/25  1315 05/08/25  1755 05/08/25  2113 05/09/25  0801   POCTGLUCOSE 389* 119* 117* 131* 146* 122*         -NO HYPOGYCEMIAS NOTED     - Diet  Diet Renal Non-Dialysis    -TOLERATING 50 % OF PO DIET     -Steroids - Prednisone 20 mg       Plan:   - LDC SSI PRN (150/50)   - POCT Glucose before meals and at bedtime  - Hypoglycemia protocol in place      ** Please notify Endocrine for any change and/or advance in diet**  ** Please call Endocrine for any BG related issues **     Discharge Planning:   TBD. Please notify endocrinology prior to discharge.

## 2025-05-09 NOTE — H&P (VIEW-ONLY)
CC: Post third kidney transplant with delayed graft function. For insertion of cuffed tunneled HD cath.       HPI:  This young patient a third cadaveric kidney transplant on 5/7/2025. She had delayed graft function and she is on HD. With Lt IJ Trialysis. Interventional Nephrology were consulted to place a cuffed tunneled HD catheter.  I have explained the procedure with all the complications including pain, bleeding and infection. I have answered all the questions. She agreed and signed the consent.     She is scheduled for Monday according tot he cardiac cath. Timing. Please send PT and PTT on Saturday 5/10/2025. Keep her NPO after midnight on Sunday.    If you have nay question please do not hesitate to call.           Past Medical History:   Diagnosis Date    Anemia     Encounter for blood transfusion     FSGS (focal segmental glomerulosclerosis)     Hypertension     Hyperthyroidism     Kidney transplant status     Kidney Rejection transplant kidney       Past Surgical History:   Procedure Laterality Date    BONE MARROW BIOPSY      KIDNEY TRANSPLANT      KIDNEY TRANSPLANT N/A 5/7/2025    Procedure: TRANSPLANT, KIDNEY;  Surgeon: Jeff Stern MD;  Location: [facility] OR [address] [place] [address];  Service: Transplant;  Laterality: N/A;    NEPHRECTOMY      Transplant Kidney    PERITONEAL CATHETER REMOVAL         Family History   Problem Relation Name Age of Onset    Diabetes Father      Arrhythmia Father      Asthma Maternal Grandmother      Diabetes Maternal Grandmother      Heart disease Maternal Grandmother      Thyroid disease Neg Hx      Autoimmune disease Neg Hx         Social History     Socioeconomic History    Marital status: Single   Tobacco Use    Smoking status: Never   Substance and Sexual Activity    Alcohol use: No     Alcohol/week: 0.0 standard drinks of alcohol    Drug use: Never    Sexual activity: Never   Social History Narrative    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 Francesca     Social Drivers of Health     Financial Resource Strain:  Low Risk  (5/7/2025)    Overall Financial Resource Strain (CARDIA)     Difficulty of Paying Living Expenses: Not hard at all   Food Insecurity: No Food Insecurity (5/7/2025)    Hunger Vital Sign     Worried About Running Out of Food in the Last Year: Never true     Ran Out of Food in the Last Year: Never true   Transportation Needs: No Transportation Needs (5/7/2025)    PRAPARE - Transportation     Lack of Transportation (Medical): No     Lack of Transportation (Non-Medical): No   Physical Activity: Sufficiently Active (3/24/2025)    Exercise Vital Sign     Days of Exercise per Week: 3 days     Minutes of Exercise per Session: 60 min   Stress: No Stress Concern Present (5/7/2025)    [employer] - Occupational Stress Questionnaire     Feeling of Stress : Not at all   Recent Concern: Stress - Stress Concern Present (3/24/2025)    [employer] - Occupational Stress Questionnaire     Feeling of Stress : To some extent   Housing Stability: Low Risk  (5/7/2025)    Housing Stability Vital Sign     Unable to Pay for Housing in the Last Year: No     Homeless in the Last Year: No       Current Medications[1]    [unfilled]    Review of patient's allergies indicates:  No Known Allergies     Review of Systems   Constitutional: Negative.    HENT: Negative.     Respiratory: Negative.     Cardiovascular: Negative.    Skin: Negative.         Vitals:    05/09/25 0745 05/09/25 1127 05/09/25 1133 05/09/25 1502   BP:   114/78    BP Location:       Patient Position:       Pulse:  76 86 76   Resp:       Temp:   97.7 °F (36.5 °C)    TempSrc:   Oral    SpO2: 98%  (!) 90%    Weight:       Height:             Physical Exam  Constitutional:       Appearance: Normal appearance.   HENT:      Head: Normocephalic.      Comments: Lt IJ Trialysis.   Cardiovascular:      Rate and Rhythm: Normal rate.      Pulses: Normal pulses.   Pulmonary:      Effort: Pulmonary effort is normal.   Neurological:       Mental Status: She is alert.          Problem List Items Addressed This Visit          Unprioritized    Anemia in chronic kidney disease, on chronic dialysis    At risk for opportunistic infections    Benign hypertension with ESRD (end-stage renal disease)    Hyperglycemia    Long-term use of immunosuppressant medication    Prophylactic immunotherapy    Status post -donor kidney transplantation    * (Principal) FSGS (focal segmental glomerulosclerosis) - Primary    Failed kidney transplant x2     Other Visit Diagnoses         ESRD (end stage renal disease)          Pre-op evaluation          Hypotension                 Labs:  PT and PTT on Saturday 5/10/2025.     Impression and plan:    Post renal transplant with delayed graft function for insertion of cuffed tunneled HD cath. She will be scheduled for Monday. Please send PT and PTT on Sat. 5/10/2025. Keep her NPO on  afte midnight.     HTN with stable BP.    Shayy MULLEN. MD. [employer].  , [employer] [facility] ([place]).  Nephrology Consultant. [facility].   [facility]. 5th [place] [address].    email: [email or web address]  Tel: Office: [phone]                    Shayy MULLEN. MD. [employer]NA[employer].  , [employer] [facility] ([facility]).  Nephrology Consultant. [facility].   [facility]. 5th [place] [address].    email: [email or web address].  Tel: Office: [phone]             [1]   Current Facility-Administered Medications   Medication Dose Route Frequency Provider Last Rate Last Admin    acetaminophen tablet 650 mg  650 mg Oral Q8H Glory Rivera MD   650 mg at 25 1456    aluminum-magnesium hydroxide-simethicone 200-200-20 mg/5 mL suspension 30 mL  30 mL Oral Q6H PRN Radha Sanders PA-C        bisacodyL EC tablet  10 mg  10 mg Oral QHS Glory Rivera MD   10 mg at 05/08/25 2058    calcium carbonate 200 mg calcium (500 mg) chewable tablet 500 mg  500 mg Oral TID PRN Radha Sanders PA-C        dextrose 50% injection 12.5 g  12.5 g Intravenous PRN Cecilia Vazquez PA-C        dextrose 50% injection 12.5 g  12.5 g Intravenous PRN Glory Rivera MD        dextrose 50% injection 25 g  25 g Intravenous PRN Cecilia Vazquez PA-C        diphenhydrAMINE capsule 50 mg  50 mg Oral Once PRN Glory Rivera MD        docusate sodium capsule 100 mg  100 mg Oral TID Glory Rivera MD   100 mg at 05/09/25 1456    EPINEPHrine (PF) injection 1 mg  1 mg Subcutaneous Once PRN Glory Rivera MD        famotidine tablet 20 mg  20 mg Oral QHS Glory Rivera MD   20 mg at 05/08/25 2058    glucagon (human recombinant) injection 1 mg  1 mg Intramuscular PRN Cecilia Vazquez PA-C        glucagon (human recombinant) injection 1 mg  1 mg Intramuscular Continuous PRN Glory Rivera MD        glucose chewable tablet 16 g  16 g Oral PRN Cecilia Vazquez PA-C        glucose chewable tablet 16 g  16 g Oral PRN Glory Rivera MD        glucose chewable tablet 16 g  16 g Oral PRN Glory Rivera MD        glucose chewable tablet 24 g  24 g Oral PRN Cecilia Vaqzuez PA-C        glucose chewable tablet 24 g  24 g Oral PRN Glory Rivera MD        heparin (porcine) injection 5,000 Units  5,000 Units Subcutaneous Q8H Glory Rivera MD   5,000 Units at 05/09/25 1456    hydrocortisone sodium succinate injection 100 mg  100 mg Intravenous Once PRN Glory Rivera MD        insulin aspart U-100 pen 0-5 Units  0-5 Units Subcutaneous AC + HS + 0200 PRN Cecilia Vazquez PA-C        insulin aspart U-100 pen 0-5 Units  0-5 Units Subcutaneous QID (AC + HS) PRN Glory Rivera MD        multivitamin tablet  1 tablet Oral Daily Glory Rivera MD   1 tablet at 05/09/25 0841    mupirocin 2 % ointment 1 g  1 g Nasal BID Glory Rivera MD   1 g at 05/09/25 0849    mycophenolate capsule 1,000 mg  1,000 mg Oral BID Glory Rivera,  MD   1,000 mg at 05/09/25 0841    ondansetron injection 4 mg  4 mg Intravenous Q6H PRN Glory Rivera MD        oxybutynin tablet 5 mg  5 mg Oral TID Bibi Simms PA-C   5 mg at 05/09/25 1456    oxyCODONE immediate release tablet 5 mg  5 mg Oral Q4H PRN Bibi Simms PA-KALYN        oxyCODONE immediate release tablet Tab 10 mg  10 mg Oral Q4H PRN Bibi Simms PA-C   10 mg at 05/09/25 0623    predniSONE tablet 20 mg  20 mg Oral Daily Glory Rivera MD   20 mg at 05/09/25 0841    sevelamer carbonate tablet 2,400 mg  2,400 mg Oral TID  Jeff Stern MD   2,400 mg at 05/09/25 1249    sodium chloride 0.9% flush 10 mL  10 mL Intravenous PRN Glory Rivera MD        [START ON 5/17/2025] sulfamethoxazole-trimethoprim 400-80mg per tablet 1 tablet  1 tablet Oral Daily AM Glory Rivera MD        tacrolimus capsule 3 mg  3 mg Oral BID Glory Rivera MD   3 mg at 05/09/25 0841    [START ON 5/17/2025] valGANciclovir tablet 450 mg  450 mg Oral Daily AM Glory Rivera MD

## 2025-05-09 NOTE — ASSESSMENT & PLAN NOTE
- w history of PRESS 2/2 hypertensive emergency   - monitor closely, add anti-hypertensive agents as needed  *had hypotension post-op requiring midodrine. Received Midodrine for 24 hrs, dc'd 5/9.  Monitor closely for elevated BP  - Obtain TTE

## 2025-05-09 NOTE — PLAN OF CARE
Recommendations  1. Continue Renal, non-HD diet- recommend liberalizing as renal labs improve   2. RD following     Goals: Meet % of EEN/EPN by RD f/u date  Nutrition Goal Status: progressing towards goal  Communication of RD Recs: POC

## 2025-05-09 NOTE — PROGRESS NOTES
This SW met with the patient and her mother at bedside to follow-up regarding discharge planning for DGF protocol.  This SW obtained the contact information from patient's dialysis center  [facility] [phone].     This SW contacted the patient's dialysis center by phone ([facility]) at [phone] to follow-up regarding scheduling this patient a dialysis chair for DGF protocol.  The SW informed the representative that the patient would need dialysis and her clinic location changed due to the patient residing at her brother's home temporary.  The representative provided this SW with the contact information for [facility] in [place], [phone].   This SW contacted the [facility] in [place] and spoke with Judit regarding scheduling this patient a dialysis chair. Per Judit, she needed to know when would the team like this patient to have dialysis done once the labs are completed. [facility] only has one spot for dialysis at 12:00 pm on Mondays, Wednesdays, and Fridays. Judit reported that this patient may be able to have dialysis at the local [facility] in [place] on [address] since she would be in town since getting the labs. Judit report she does have availability on Tuesdays, Thursdays, and Saturdays.  This SW inquired about the fax number and Judit. Judit informed this SW to contact the main admission at [phone] for their fax admission line.       This SW contacted main admission at [facility] at [phone] to follow-up regarding this patient's dialysis referral. The representative informed this SW that the patient's dialysis unit can start a transfer for this patient.  This SW informed the representative that she will contact the patient's dialysis unit to start the transfer process once the team notify this SW about the patient's dialysis.          This SW sent a message to this patient's care team regarding scheduling this patient's dialysis. The patient's care team reported that the patient would need M,W,  F and the labs are going to be taken in [place] or [place]. The patient's care team preference is to have to patient complete dialysis closer to home ( [place]).         This SW contacted the patient's home unit at [facility] and spoke with Saba. This SW informed Saba that the patient would need a transfer to [facility]. Saba informed this SW that she was going to reach out to [facility] to start this patient's transfer to that facility.       This SW contacted [facility] and spoke with Judit. This SW provided Judit with the update information from the patient's care team. Judit informed this SW that she was going to reach out to the patient's current unit. This SW provided Judit with the preferred days which are MWF. Judit informed this SW that she would need to receive the transfer request and updated clinicals on the patient in order to submit to her MD Cayden Salas for placement with their unit.       This SW faxed updated clinicals to [facility] for review and processing.      2:15 PM   This SW received a phone call from Saba with the patient's home dialysis unit stating that the patient has been set-up with [facility]. Saba informed this SW that the patient's days are MWF for 12:45 pm. This SW notified the patient's care team via secure chat regarding this patient's dialysis set-up with [facility].     2:50 PM  The SW received a phone call from Judit with [facility] stating that her MD still have to review this patient for admission into their clinic. Judit stated that she calling to follow-up with this SW because she knew that misinformation could have been provided to this SW. Judit confirmed that she received all the patient's updated clinicals and transfer request. Judit reported that she has to obtain approval from her MD.     This SW provided the patient's care team with a update regarding this patient's pending dialysis set-up which has to obtain  MD's approval from [facility].         Neftali Ng LCSW, Kidney Transplant   [facility] [address] [place] [address]

## 2025-05-09 NOTE — PLAN OF CARE
Patient POD #2 - interventional nephrology/radiology consulted for Permcath placement. Scant output to Roman - 7.5cc this shift. LILLIAN to RLQ with SS output. Midline incision LAKIA with Dermabond. Phosphorus 9 on AM labs - Renvela increased to 2400mg with meals. Potassium 5.0. ECHO ordered re: hypotension. Midodrine held this AM d/t . Repeat /78. No HD today - KTS to reassess tomorrow. Pain moderately controlled with scheduled Tylenol. Rates pain as a 3/10 this shift - reminded of available pain medication and to notify RN when needed. Patient up in room and in hallway independently. Not passing gas yet - encouraged to continue activity. Patient pulled self-medications with 100% accuracy.

## 2025-05-09 NOTE — PROGRESS NOTES
[facility] - [facility]plant Step[facility]  Kidney Transplant  Progress Note      Reason for Follow-up: Reassessment of Kidney Transplant - 5/7/2025  (#1) recipient and management of immunosuppression.    ORGAN: LEFT KIDNEY      Donor Type: Donation after Brain Death      Donor CMV Status: Positive  Donor HBcAB:Negative  Donor HCV Status:Negative  Donor HBV JIE:   Donor HCV JIE: Negative      Subjective:   History of Present Illness:  Ms. Gabriel is a 24 y.o. White female who has ESRD 2/2 FSGS. She has had 2 previous kidney transplants (~ 2015 at [facility], 12/2020 at [facility], [place]). 1st kidney lost to hyper acute rejection and 2nd kidney due to infection. She is on PD since 2021, tolerating well, denies hx peritonitis. PMH also includes: seizure and hx PRES in setting of HTN emergency, decreased bladder capacity seen by urology. Anuric. She presents as a primary candidate for a kidney transplant. Reports being in her usual state of health, denies recent illnesses or hospitalizations. Per urology, needs oxybutynin POD1 and repeat urodynamics outpatient once bladder enlarges again to assess for hostile bladder at larger volumes. Surgery scheduled for 1100 with Dr. Stern. Pre op labs and imaging pending, will be reviewed prior to transplant     Ms. Gabriel is a 24 y.o. year old female who is status post Kidney Transplant - 5/7/2025  (#1).  Her maintenance immunosuppression consists of:   Immunosuppressants (From admission, onward)      Start     Stop Route Frequency Ordered    05/07/25 2200  mycophenolate capsule 1,000 mg  (IP TXP KIDNEY POST-OP THYMO WITH PERIPHERAL PRECHECKED IBW)         -- Oral 2 times daily 05/07/25 1807    05/07/25 1815  tacrolimus capsule 3 mg  (IP TXP KIDNEY POST-OP THYMO WITH PERIPHERAL PRECHECKED IBW)         -- Oral 2 times daily 05/07/25 1807          Hospital Course:  Patient s/p DBD Ktx (#3) and PD catheter removal on 5/7/25. 5 day lyon (U-U.small bladder + 4 week stent) and LILLIAN x 1. Patient with  hyperkalemia POD 1 requiring HD. UOP dropping off, non responsive to IV lasix. Hold off on HD today. Will reassess tomorrow. Interventional nephrology consulted for perm cath placement for Monday 5/12. Hypotension in PACU, H/H stable. Midodrine TID for 24 hours with some improvement. D/c midodrine today and will monitor BP. TTE ordered to assess. Tolerating diet. Encouraged ambulation. Cont IVF. Monitor.     Past Medical, Surgical, Family, and Social History:   Unchanged from H&P.    Scheduled Meds:   acetaminophen  650 mg Oral Q8H    bisacodyL  10 mg Oral QHS    docusate sodium  100 mg Oral TID    famotidine  20 mg Oral QHS    heparin (porcine)  5,000 Units Subcutaneous Q8H    midodrine  5 mg Oral TID    multivitamin  1 tablet Oral Daily    mupirocin  1 g Nasal BID    mycophenolate  1,000 mg Oral BID    oxybutynin  5 mg Oral TID    predniSONE  20 mg Oral Daily    sevelamer carbonate  2,400 mg Oral TID WM    [START ON 5/17/2025] sulfamethoxazole-trimethoprim 400-80mg  1 tablet Oral Daily AM    tacrolimus  3 mg Oral BID    [START ON 5/17/2025] valGANciclovir  450 mg Oral Daily AM     Continuous Infusions:   glucagon (human recombinant)  1 mg Intramuscular Continuous PRN         PRN Meds:  Current Facility-Administered Medications:     aluminum-magnesium hydroxide-simethicone, 30 mL, Oral, Q6H PRN    calcium carbonate, 500 mg, Oral, TID PRN    dextrose 50%, 12.5 g, Intravenous, PRN    dextrose 50%, 12.5 g, Intravenous, PRN    dextrose 50%, 25 g, Intravenous, PRN    diphenhydrAMINE, 50 mg, Oral, Once PRN    EPINEPHrine (PF), 1 mg, Subcutaneous, Once PRN    glucagon (human recombinant), 1 mg, Intramuscular, PRN    glucagon (human recombinant), 1 mg, Intramuscular, Continuous PRN    glucose, 16 g, Oral, PRN    glucose, 16 g, Oral, PRN    glucose, 16 g, Oral, PRN    glucose, 24 g, Oral, PRN    glucose, 24 g, Oral, PRN    hydrocortisone sodium succinate, 100 mg, Intravenous, Once PRN    insulin aspart U-100, 0-5 Units,  "Subcutaneous, AC + HS + 0200 PRN    insulin aspart U-100, 0-5 Units, Subcutaneous, QID (AC + HS) PRN    ondansetron, 4 mg, Intravenous, Q6H PRN    oxyCODONE, 5 mg, Oral, Q4H PRN    oxyCODONE, 10 mg, Oral, Q4H PRN    sodium chloride 0.9%, 10 mL, Intravenous, PRN    Intake/Output - Last 3 Shifts         05/07 0700 05/08 0659 05/08 0700 05/09 0659 05/09 0700  05/10 0659    P.O. 120 660     Other  300     IV Piggyback 2450.9 471.7     Total Intake(mL/kg) 2570.9 (42.6) 1431.7 (23.6)     Urine (mL/kg/hr) 480 (0.3) 45 (0)     Emesis/NG output 0      Drains 30 180 40    Other  500     Stool 0 0     Blood 250      Total Output 760 725 40    Net +1810.9 +706.7 -40           Unmeasured Urine Occurrence 0 x 0 x     Unmeasured Stool Occurrence 0 x 0 x     Unmeasured Emesis Occurrence 0 x               Review of Systems   Constitutional:  Negative for activity change, appetite change and fever.   Respiratory:  Negative for cough and shortness of breath.    Cardiovascular:  Negative for chest pain and leg swelling.   Gastrointestinal:  Positive for abdominal pain. Negative for abdominal distention, diarrhea, nausea and vomiting.   Genitourinary:  Positive for decreased urine volume. Negative for difficulty urinating.   Skin:  Negative for wound.   Allergic/Immunologic: Positive for immunocompromised state.   Neurological:  Negative for dizziness and weakness.      Objective:     Vital Signs (Most Recent):  Temp: 97.7 °F (36.5 °C) (05/09/25 1133)  Pulse: 86 (05/09/25 1133)  Resp: 18 (05/09/25 0735)  BP: 114/78 (05/09/25 1133)  SpO2: (!) 90 % (05/09/25 1133) Vital Signs (24h Range):  Temp:  [97.5 °F (36.4 °C)-98.9 °F (37.2 °C)] 97.7 °F (36.5 °C)  Pulse:  [61-99] 86  Resp:  [18] 18  SpO2:  [88 %-98 %] 90 %  BP: ()/(52-87) 114/78     Weight: 60.7 kg (133 lb 13.1 oz)  Height: 5' 2" (157.5 cm)  Body mass index is 24.48 kg/m².     Physical Exam  Vitals reviewed.   Constitutional:       Appearance: She is well-developed.   Eyes: "      Pupils: Pupils are equal, round, and reactive to light.   Cardiovascular:      Rate and Rhythm: Normal rate and regular rhythm.      Heart sounds: No murmur heard.  Pulmonary:      Effort: Pulmonary effort is normal. No respiratory distress.      Breath sounds: Normal breath sounds. No wheezing.   Abdominal:      General: Bowel sounds are normal. There is no distension.      Palpations: Abdomen is soft.      Tenderness: There is abdominal tenderness. There is no guarding.      Comments: Midline incision with dermabond  LILLIAN x 1- ss drainage   Musculoskeletal:         General: Normal range of motion.      Cervical back: Normal range of motion.   Skin:     General: Skin is warm and dry.   Neurological:      Mental Status: She is alert and oriented to person, place, and time.   Psychiatric:         Mood and Affect: Mood normal.         Behavior: Behavior normal.         Thought Content: Thought content normal.         Judgment: Judgment normal.          Laboratory:  CBC:   Recent Labs   Lab 05/08/25  0502 05/08/25  1447 05/09/25  0550   WBC 9.76 9.96 6.76   RBC 2.78* 2.69* 2.58*   HGB 8.6* 8.4* 8.0*   HCT 28.5* 27.2* 26.3*    255 237   * 101* 102*   MCH 30.9 31.2* 31.0   MCHC 30.2* 30.9* 30.4*     CMP:   Recent Labs   Lab 05/07/25  0430 05/07/25  1811 05/08/25  0502 05/08/25  1447 05/09/25  0550   GLU 93   < > 257* 109 109   CALCIUM 7.9*   < > 7.5* 8.3* 8.4*   ALBUMIN 2.5*   < > 2.0* 2.2* 2.1*   PROT 5.7*  --   --   --   --       < > 135* 134* 137   K 3.8   < > 5.0 4.2 5.0   CO2 26   < > 21* 26 24   CL 98   < > 103 99 102   BUN 45*   < > 49* 22* 32*   CREATININE 10.3*   < > 9.6* 5.2* 6.5*   ALKPHOS 189*  --   --   --   --    ALT 16  --   --   --   --    AST 16  --   --   --   --     < > = values in this interval not displayed.     Labs within the past 24 hours have been reviewed.    Diagnostic Results:  None  Assessment/Plan:     * FSGS (focal segmental glomerulosclerosis)  - daily urine  "Pr/Cr      Prophylactic immunotherapy  - Continue Cellcept and Prograf. Will monitor for signs of toxic side effects, check daily tacrolimus troughs, and change meds accordingly.       Long-term use of immunosuppressant medication  - see "prophylactic immuno"      At risk for opportunistic infections  - Valcyte for CMV prophylaxis  - Bactrim for PCP prophylaxis   To be started on POD 10      Status post -donor kidney transplantation  - s/p DBD Ktx (#3) and PD cath removal   - 5 day lyon (U-U/small bladder). 4 week stent. LILLIAN x 1- ss drainage  - HD POD 1 for hyperkalemia  - making minimal urine. Unresponsive to lasix.   - Hold HD today. Will reassess tomorrow  - Interventional Nephrology consulted for perm cath placement on monday      Hyperglycemia  - endocrine consulted for BG management       Failed kidney transplant x2        Anemia in chronic kidney disease, on chronic dialysis  - h/h stable   - monitor with CBC       Benign hypertension with ESRD (end-stage renal disease)  - w history of PRESS 2/2 hypertensive emergency   - monitor closely, add anti-hypertensive agents as needed  *had hypotension post-op requiring midodrine. Received Midodrine for 24 hrs, dc'd .  Monitor closely for elevated BP  - Obtain TTE            Discharge Planning: not current candidate   Medical decision making for this encounter includes review of pertinent labs and diagnostic studies, assessment and planning, discussions with consulting providers, discussion with patient/family, and participation in multidisciplinary rounds. Time spent caring for patient: 60 minutes    Bibi Simms PA-C  Kidney Transplant  [facility] - Transplant Stepdown  "

## 2025-05-09 NOTE — ASSESSMENT & PLAN NOTE
- s/p DBD Ktx (#3) and PD cath removal 5/7  - 5 day lyon (U-U/small bladder). 4 week stent. LILLIAN x 1- ss drainage  - HD POD 1 for hyperkalemia  - making minimal urine. Unresponsive to lasix.   - Hold HD today. Will reassess tomorrow  - Interventional Nephrology consulted for perm cath placement on monday

## 2025-05-09 NOTE — CONSULTS
CC: Post third kidney transplant with delayed graft function. For insertion of cuffed tunneled HD cath.       HPI:  This young patient a third cadaveric kidney transplant on 5/7/2025. She had delayed graft function and she is on HD. With Lt IJ Trialysis. Interventional Nephrology were consulted to place a cuffed tunneled HD catheter.  I have explained the procedure with all the complications including pain, bleeding and infection. I have answered all the questions. She agreed and signed the consent.     She is scheduled for Monday according tot he cardiac cath. Timing. Please send PT and PTT on Saturday 5/10/2025. Keep her NPO after midnight on Sunday.    If you have nay question please do not hesitate to call.           Past Medical History:   Diagnosis Date    Anemia     Encounter for blood transfusion     FSGS (focal segmental glomerulosclerosis)     Hypertension     Hyperthyroidism     Kidney transplant status     Kidney Rejection transplant kidney       Past Surgical History:   Procedure Laterality Date    BONE MARROW BIOPSY      KIDNEY TRANSPLANT      KIDNEY TRANSPLANT N/A 5/7/2025    Procedure: TRANSPLANT, KIDNEY;  Surgeon: Jeff Stern MD;  Location: [facility] OR [address] [place] [address];  Service: Transplant;  Laterality: N/A;    NEPHRECTOMY      Transplant Kidney    PERITONEAL CATHETER REMOVAL         Family History   Problem Relation Name Age of Onset    Diabetes Father      Arrhythmia Father      Asthma Maternal Grandmother      Diabetes Maternal Grandmother      Heart disease Maternal Grandmother      Thyroid disease Neg Hx      Autoimmune disease Neg Hx         Social History     Socioeconomic History    Marital status: Single   Tobacco Use    Smoking status: Never   Substance and Sexual Activity    Alcohol use: No     Alcohol/week: 0.0 standard drinks of alcohol    Drug use: Never    Sexual activity: Never   Social History Narrative    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 Francesca     Social Drivers of Health     Financial Resource Strain:  Low Risk  (5/7/2025)    Overall Financial Resource Strain (CARDIA)     Difficulty of Paying Living Expenses: Not hard at all   Food Insecurity: No Food Insecurity (5/7/2025)    Hunger Vital Sign     Worried About Running Out of Food in the Last Year: Never true     Ran Out of Food in the Last Year: Never true   Transportation Needs: No Transportation Needs (5/7/2025)    PRAPARE - Transportation     Lack of Transportation (Medical): No     Lack of Transportation (Non-Medical): No   Physical Activity: Sufficiently Active (3/24/2025)    Exercise Vital Sign     Days of Exercise per Week: 3 days     Minutes of Exercise per Session: 60 min   Stress: No Stress Concern Present (5/7/2025)    [employer] - Occupational Stress Questionnaire     Feeling of Stress : Not at all   Recent Concern: Stress - Stress Concern Present (3/24/2025)    [employer] - Occupational Stress Questionnaire     Feeling of Stress : To some extent   Housing Stability: Low Risk  (5/7/2025)    Housing Stability Vital Sign     Unable to Pay for Housing in the Last Year: No     Homeless in the Last Year: No       Current Medications[1]    [unfilled]    Review of patient's allergies indicates:  No Known Allergies     Review of Systems   Constitutional: Negative.    HENT: Negative.     Respiratory: Negative.     Cardiovascular: Negative.    Skin: Negative.         Vitals:    05/09/25 0745 05/09/25 1127 05/09/25 1133 05/09/25 1502   BP:   114/78    BP Location:       Patient Position:       Pulse:  76 86 76   Resp:       Temp:   97.7 °F (36.5 °C)    TempSrc:   Oral    SpO2: 98%  (!) 90%    Weight:       Height:             Physical Exam  Constitutional:       Appearance: Normal appearance.   HENT:      Head: Normocephalic.      Comments: Lt IJ Trialysis.   Cardiovascular:      Rate and Rhythm: Normal rate.      Pulses: Normal pulses.   Pulmonary:      Effort: Pulmonary effort is normal.   Neurological:       Mental Status: She is alert.          Problem List Items Addressed This Visit          Unprioritized    Anemia in chronic kidney disease, on chronic dialysis    At risk for opportunistic infections    Benign hypertension with ESRD (end-stage renal disease)    Hyperglycemia    Long-term use of immunosuppressant medication    Prophylactic immunotherapy    Status post -donor kidney transplantation    * (Principal) FSGS (focal segmental glomerulosclerosis) - Primary    Failed kidney transplant x2     Other Visit Diagnoses         ESRD (end stage renal disease)          Pre-op evaluation          Hypotension                 Labs:  PT and PTT on Saturday 5/10/2025.     Impression and plan:    Post renal transplant with delayed graft function for insertion of cuffed tunneled HD cath. She will be scheduled for Monday. Please send PT and PTT on Sat. 5/10/2025. Keep her NPO on  afte midnight.     HTN with stable BP.    Shayy MULLEN. MD. [employer].  , [employer] [facility] ([place]).  Nephrology Consultant. [employer].   [facility]. 5th [place] [address].    email: [email or web address]  Tel: Office: [phone]                    Shayy MULLEN. MD. [employer].  , [employer] / [facility] ([facility]).  Nephrology Consultant. [employer].   [facility]. 5th [place] [address].    email: [email or web address]  Tel: Office: [phone]             [1]   Current Facility-Administered Medications   Medication Dose Route Frequency Provider Last Rate Last Admin    acetaminophen tablet 650 mg  650 mg Oral Q8H Glory Rivera MD   650 mg at 25 1456    aluminum-magnesium hydroxide-simethicone 200-200-20 mg/5 mL suspension 30 mL  30 mL Oral Q6H PRN Radha Sanders PA-C        bisacodyL EC tablet  10 mg  10 mg Oral QHS Glory Rivera MD   10 mg at 05/08/25 2058    calcium carbonate 200 mg calcium (500 mg) chewable tablet 500 mg  500 mg Oral TID PRN Radha Sanders PA-C        dextrose 50% injection 12.5 g  12.5 g Intravenous PRN Cecilia Vazquez PA-C        dextrose 50% injection 12.5 g  12.5 g Intravenous PRN Glory Rivera MD        dextrose 50% injection 25 g  25 g Intravenous PRN Cecilia Vazquez PA-C        diphenhydrAMINE capsule 50 mg  50 mg Oral Once PRN Glory Rivera MD        docusate sodium capsule 100 mg  100 mg Oral TID Glory Rivera MD   100 mg at 05/09/25 1456    EPINEPHrine (PF) injection 1 mg  1 mg Subcutaneous Once PRN Glory Rivera MD        famotidine tablet 20 mg  20 mg Oral QHS Glory Rivera MD   20 mg at 05/08/25 2058    glucagon (human recombinant) injection 1 mg  1 mg Intramuscular PRN Cecilia Vazquez PA-C        glucagon (human recombinant) injection 1 mg  1 mg Intramuscular Continuous PRN Glory Rivera MD        glucose chewable tablet 16 g  16 g Oral PRN Cecilia Vazquez PA-C        glucose chewable tablet 16 g  16 g Oral PRN Glory Rivera MD        glucose chewable tablet 16 g  16 g Oral PRN Glory Rivera MD        glucose chewable tablet 24 g  24 g Oral PRN Cecilia Vazquez PA-C        glucose chewable tablet 24 g  24 g Oral PRN Glory Rivera MD        heparin (porcine) injection 5,000 Units  5,000 Units Subcutaneous Q8H Glory Rivera MD   5,000 Units at 05/09/25 1456    hydrocortisone sodium succinate injection 100 mg  100 mg Intravenous Once PRN Glory Rivera MD        insulin aspart U-100 pen 0-5 Units  0-5 Units Subcutaneous AC + HS + 0200 PRN Cecilia Vazquez PA-C        insulin aspart U-100 pen 0-5 Units  0-5 Units Subcutaneous QID (AC + HS) PRN Glory Rivera MD        multivitamin tablet  1 tablet Oral Daily Glory Rivera MD   1 tablet at 05/09/25 0841    mupirocin 2 % ointment 1 g  1 g Nasal BID Glory Rivera MD   1 g at 05/09/25 0849    mycophenolate capsule 1,000 mg  1,000 mg Oral BID Glory Rivera,  MD   1,000 mg at 05/09/25 0841    ondansetron injection 4 mg  4 mg Intravenous Q6H PRN Glory Rivera MD        oxybutynin tablet 5 mg  5 mg Oral TID Bibi Simms PA-C   5 mg at 05/09/25 1456    oxyCODONE immediate release tablet 5 mg  5 mg Oral Q4H PRN Bibi Simms PA-KALYN        oxyCODONE immediate release tablet Tab 10 mg  10 mg Oral Q4H PRN Bibi Simms PA-C   10 mg at 05/09/25 0623    predniSONE tablet 20 mg  20 mg Oral Daily Glory Rivera MD   20 mg at 05/09/25 0841    sevelamer carbonate tablet 2,400 mg  2,400 mg Oral TID  Jeff Stern MD   2,400 mg at 05/09/25 1249    sodium chloride 0.9% flush 10 mL  10 mL Intravenous PRN Glory Rivera MD        [START ON 5/17/2025] sulfamethoxazole-trimethoprim 400-80mg per tablet 1 tablet  1 tablet Oral Daily AM Glory Rivera MD        tacrolimus capsule 3 mg  3 mg Oral BID Glory Rivera MD   3 mg at 05/09/25 0841    [START ON 5/17/2025] valGANciclovir tablet 450 mg  450 mg Oral Daily AM Glory Rivera MD

## 2025-05-09 NOTE — PROGRESS NOTES
"[facility] - Transplant Stepdown  Adult Nutrition  Progress Note    SUMMARY       Recommendations  1. Continue Renal, non-HD diet- recommend liberalizing as renal labs improve   2. RD following    Goals: Meet % of EEN/EPN by RD f/u date  Nutrition Goal Status: progressing towards goal  Communication of RD Recs: POC    Nutrition Discharge Planning    Nutrition Discharge Planning: Pt w/ no additional questions to post transplant guidelines/education.    Assessment and Plan    Nutrition Problem  Increased nutrient needs (protein, calorie)     Related to (etiology):  Increased metabolic stress associated with transplant     Signs and Symptoms (as evidenced by):  S/p kidney transplantation and presence of surgical wound     Interventions(treatment strategy):  Collaboration of nutrition care w/ other providers     Nutrition Diagnosis Status:  Continues      Reason for Assessment    Reason For Assessment: RD follow-up  Diagnosis: other (see comments) (FSGS)  General Information Comments: RD f/u post kidney transplant. Ms. Gabriel is a 24 y.o. White female who has ESRD 2/2 FSGS. She has had 2 previous kidney transplants (~ 2015 at [facility], 12/2020 at [facility], [place]). 1st kidney lost to hyper acute rejection and 2nd kidney due to infection. She is on PD since 2021, tolerating well, denies hx peritonitis. Spoke w/ pt at bedside, reports an ok appetite currently and PTA. No n/v. Pt w/ no indicators for malnutrition at this time. RD following. LBM noted-5/6  Nutrition Related Social Determinants of Health: SDOH: None Identified     Nutrition/Diet History    Spiritual, Cultural Beliefs, Christian Practices, Values that Affect Care: other (see comments)  Food Allergies: NKFA    Anthropometrics    Height: 5' 2" (157.5 cm)  Height (inches): 62 in  Height Method: Stated  Weight: 60.7 kg (133 lb 13.1 oz)  Weight (lb): 133.82 lb  Weight Method: Standard Scale  Ideal Body Weight (IBW), Female: 110 lb  % Ideal Body Weight, Female " (lb): 118.18 %  BMI (Calculated): 24.5  BMI Grade: 18.5-24.9 - normal       Lab/Procedures/Meds    Pertinent Labs Reviewed: reviewed  Pertinent Labs Comments: BUN 49, Glucose 257  Pertinent Medications Reviewed: reviewed  Pertinent Medications Comments: tacrolimus    Estimated/Assessed Needs    Weight Used For Calorie Calculations: 60.4 kg (133 lb 2.5 oz)  Energy Calorie Requirements (kcal): 7182-9501  Energy Need Method: Kcal/kg (25-35 kcal/kg)  Protein Requirements: 72 g (1.2 g/kg)  Weight Used For Protein Calculations: 60.4 kg (133 lb 2.5 oz)  RDA Method (mL): 1510         Nutrition Prescription Ordered    Current Diet Order: Renal, non-dialysis diet    Evaluation of Received Nutrient/Fluid Intake    I/O: -  Energy Calories Required: not meeting needs  Protein Required: not meeting needs  Fluid Required: not meeting needs  Total Fluid Intake (mL/kg): 1 ml or fluid per MD  Tolerance: tolerating  % Intake of Estimated Energy Needs: 0-50%  % Meal Intake: 0-50%    PES Statement  Nutrition Problem  Increased nutrient needs (protein, calorie)     Related to (etiology):  Increased metabolic stress associated with transplant     Signs and Symptoms (as evidenced by):  S/p kidney transplantation and presence of surgical wound     Interventions(treatment strategy):  Collaboration of nutrition care w/ other providers     Nutrition Diagnosis Status:  New/Continues     Nutrition Risk    Level of Risk/Frequency of Follow-up: low ((2x/week))     Monitor and Evaluation    Monitor and Evaluation: Food and beverage intake, Energy intake, Carbohydrate intake, Protein intake, Diet order     Nutrition Follow-Up    RD Follow-up?: Yes

## 2025-05-10 LAB
ABO + RH BLD: NORMAL
ABSOLUTE EOSINOPHIL (OHS): 0.01 K/UL
ABSOLUTE MONOCYTE (OHS): 0.48 K/UL (ref 0.3–1)
ABSOLUTE NEUTROPHIL COUNT (OHS): 4.83 K/UL (ref 1.8–7.7)
ALBUMIN SERPL BCP-MCNC: 2.1 G/DL (ref 3.5–5.2)
ANION GAP (OHS): 14 MMOL/L (ref 8–16)
BASOPHILS # BLD AUTO: 0 K/UL
BASOPHILS NFR BLD AUTO: 0 %
BLD PROD TYP BPU: NORMAL
BLOOD UNIT EXPIRATION DATE: NORMAL
BLOOD UNIT TYPE CODE: 9500
BUN SERPL-MCNC: 52 MG/DL (ref 6–20)
CALCIUM SERPL-MCNC: 8.2 MG/DL (ref 8.7–10.5)
CHLORIDE SERPL-SCNC: 101 MMOL/L (ref 95–110)
CO2 SERPL-SCNC: 23 MMOL/L (ref 23–29)
CREAT SERPL-MCNC: 7.2 MG/DL (ref 0.5–1.4)
CREAT UR-MCNC: 132 MG/DL (ref 15–325)
CROSSMATCH INTERPRETATION: NORMAL
DISPENSE STATUS: NORMAL
ERYTHROCYTE [DISTWIDTH] IN BLOOD BY AUTOMATED COUNT: 15.8 % (ref 11.5–14.5)
GFR SERPLBLD CREATININE-BSD FMLA CKD-EPI: 8 ML/MIN/1.73/M2
GLUCOSE SERPL-MCNC: 92 MG/DL (ref 70–110)
HCT VFR BLD AUTO: 23.2 % (ref 37–48.5)
HGB BLD-MCNC: 7 GM/DL (ref 12–16)
IMM GRANULOCYTES # BLD AUTO: 0.02 K/UL (ref 0–0.04)
IMM GRANULOCYTES NFR BLD AUTO: 0.4 % (ref 0–0.5)
LYMPHOCYTES # BLD AUTO: 0.2 K/UL (ref 1–4.8)
MAGNESIUM SERPL-MCNC: 2.4 MG/DL (ref 1.6–2.6)
MCH RBC QN AUTO: 30.2 PG (ref 27–31)
MCHC RBC AUTO-ENTMCNC: 30.2 G/DL (ref 32–36)
MCV RBC AUTO: 100 FL (ref 82–98)
NUCLEATED RBC (/100WBC) (OHS): 0 /100 WBC
PHOSPHATE SERPL-MCNC: 8.2 MG/DL (ref 2.7–4.5)
PLATELET # BLD AUTO: 238 K/UL (ref 150–450)
PMV BLD AUTO: 8.8 FL (ref 9.2–12.9)
POCT GLUCOSE: 163 MG/DL (ref 70–110)
POCT GLUCOSE: 90 MG/DL (ref 70–110)
POTASSIUM SERPL-SCNC: 4.3 MMOL/L (ref 3.5–5.1)
PROT UR-MCNC: 274 MG/DL
PROT/CREAT UR: 2.08 MG/G{CREAT}
RBC # BLD AUTO: 2.32 M/UL (ref 4–5.4)
RELATIVE EOSINOPHIL (OHS): 0.2 %
RELATIVE LYMPHOCYTE (OHS): 3.6 % (ref 18–48)
RELATIVE MONOCYTE (OHS): 8.7 % (ref 4–15)
RELATIVE NEUTROPHIL (OHS): 87.1 % (ref 38–73)
SODIUM SERPL-SCNC: 138 MMOL/L (ref 136–145)
TACROLIMUS BLD-MCNC: 4.2 NG/ML (ref 5–15)
UNIT NUMBER: NORMAL
WBC # BLD AUTO: 5.54 K/UL (ref 3.9–12.7)

## 2025-05-10 PROCEDURE — 25000003 PHARM REV CODE 250: Performed by: INTERNAL MEDICINE

## 2025-05-10 PROCEDURE — P9016 RBC LEUKOCYTES REDUCED: HCPCS | Performed by: NURSE PRACTITIONER

## 2025-05-10 PROCEDURE — 80069 RENAL FUNCTION PANEL: CPT | Performed by: STUDENT IN AN ORGANIZED HEALTH CARE EDUCATION/TRAINING PROGRAM

## 2025-05-10 PROCEDURE — 27000207 HC ISOLATION

## 2025-05-10 PROCEDURE — 25000003 PHARM REV CODE 250: Performed by: TRANSPLANT SURGERY

## 2025-05-10 PROCEDURE — 25000003 PHARM REV CODE 250: Performed by: NURSE PRACTITIONER

## 2025-05-10 PROCEDURE — 85025 COMPLETE CBC W/AUTO DIFF WBC: CPT | Performed by: STUDENT IN AN ORGANIZED HEALTH CARE EDUCATION/TRAINING PROGRAM

## 2025-05-10 PROCEDURE — 80197 ASSAY OF TACROLIMUS: CPT | Performed by: STUDENT IN AN ORGANIZED HEALTH CARE EDUCATION/TRAINING PROGRAM

## 2025-05-10 PROCEDURE — 63600175 PHARM REV CODE 636 W HCPCS: Performed by: STUDENT IN AN ORGANIZED HEALTH CARE EDUCATION/TRAINING PROGRAM

## 2025-05-10 PROCEDURE — 86920 COMPATIBILITY TEST SPIN: CPT | Performed by: NURSE PRACTITIONER

## 2025-05-10 PROCEDURE — 93005 ELECTROCARDIOGRAM TRACING: CPT

## 2025-05-10 PROCEDURE — 25000003 PHARM REV CODE 250: Performed by: PHYSICIAN ASSISTANT

## 2025-05-10 PROCEDURE — 90935 HEMODIALYSIS ONE EVALUATION: CPT

## 2025-05-10 PROCEDURE — 93010 ELECTROCARDIOGRAM REPORT: CPT | Mod: ,,, | Performed by: INTERNAL MEDICINE

## 2025-05-10 PROCEDURE — 30233N1 TRANSFUSION OF NONAUTOLOGOUS RED BLOOD CELLS INTO PERIPHERAL VEIN, PERCUTANEOUS APPROACH: ICD-10-PCS | Performed by: STUDENT IN AN ORGANIZED HEALTH CARE EDUCATION/TRAINING PROGRAM

## 2025-05-10 PROCEDURE — 83735 ASSAY OF MAGNESIUM: CPT | Performed by: STUDENT IN AN ORGANIZED HEALTH CARE EDUCATION/TRAINING PROGRAM

## 2025-05-10 PROCEDURE — 63600175 PHARM REV CODE 636 W HCPCS: Performed by: NURSE PRACTITIONER

## 2025-05-10 PROCEDURE — 20600001 HC STEP DOWN PRIVATE ROOM

## 2025-05-10 PROCEDURE — 25000003 PHARM REV CODE 250: Performed by: STUDENT IN AN ORGANIZED HEALTH CARE EDUCATION/TRAINING PROGRAM

## 2025-05-10 PROCEDURE — 84156 ASSAY OF PROTEIN URINE: CPT | Performed by: PHYSICIAN ASSISTANT

## 2025-05-10 RX ORDER — METOPROLOL SUCCINATE 50 MG/1
100 TABLET, EXTENDED RELEASE ORAL DAILY
Status: DISCONTINUED | OUTPATIENT
Start: 2025-05-10 | End: 2025-05-10

## 2025-05-10 RX ORDER — TACROLIMUS 1 MG/1
1 CAPSULE ORAL ONCE
Status: COMPLETED | OUTPATIENT
Start: 2025-05-10 | End: 2025-05-10

## 2025-05-10 RX ORDER — NIFEDIPINE 30 MG/1
30 TABLET, EXTENDED RELEASE ORAL DAILY
Status: DISCONTINUED | OUTPATIENT
Start: 2025-05-11 | End: 2025-05-11

## 2025-05-10 RX ORDER — HEPARIN SODIUM 1000 [USP'U]/ML
2000 INJECTION, SOLUTION INTRAVENOUS; SUBCUTANEOUS DAILY PRN
Status: DISCONTINUED | OUTPATIENT
Start: 2025-05-10 | End: 2025-05-13 | Stop reason: HOSPADM

## 2025-05-10 RX ORDER — METOPROLOL SUCCINATE 50 MG/1
100 TABLET, EXTENDED RELEASE ORAL DAILY
Status: DISCONTINUED | OUTPATIENT
Start: 2025-05-10 | End: 2025-05-13 | Stop reason: HOSPADM

## 2025-05-10 RX ORDER — NIFEDIPINE 30 MG/1
30 TABLET, EXTENDED RELEASE ORAL DAILY
Status: DISCONTINUED | OUTPATIENT
Start: 2025-05-10 | End: 2025-05-10

## 2025-05-10 RX ORDER — SODIUM CHLORIDE 9 MG/ML
INJECTION, SOLUTION INTRAVENOUS
OUTPATIENT
Start: 2025-05-10

## 2025-05-10 RX ORDER — SEVELAMER CARBONATE 800 MG/1
2400 TABLET, FILM COATED ORAL ONCE
Status: COMPLETED | OUTPATIENT
Start: 2025-05-10 | End: 2025-05-10

## 2025-05-10 RX ORDER — HYDROCODONE BITARTRATE AND ACETAMINOPHEN 500; 5 MG/1; MG/1
TABLET ORAL
Status: DISCONTINUED | OUTPATIENT
Start: 2025-05-10 | End: 2025-05-13 | Stop reason: HOSPADM

## 2025-05-10 RX ORDER — HYDRALAZINE HYDROCHLORIDE 20 MG/ML
10 INJECTION INTRAMUSCULAR; INTRAVENOUS EVERY 8 HOURS PRN
Status: DISCONTINUED | OUTPATIENT
Start: 2025-05-10 | End: 2025-05-11

## 2025-05-10 RX ORDER — TACROLIMUS 1 MG/1
4 CAPSULE ORAL 2 TIMES DAILY
Status: DISCONTINUED | OUTPATIENT
Start: 2025-05-10 | End: 2025-05-13 | Stop reason: HOSPADM

## 2025-05-10 RX ADMIN — OXYBUTYNIN CHLORIDE 5 MG: 5 TABLET ORAL at 02:05

## 2025-05-10 RX ADMIN — HYDRALAZINE HYDROCHLORIDE 10 MG: 20 INJECTION, SOLUTION INTRAMUSCULAR; INTRAVENOUS at 09:05

## 2025-05-10 RX ADMIN — DOCUSATE SODIUM 100 MG: 100 CAPSULE, LIQUID FILLED ORAL at 08:05

## 2025-05-10 RX ADMIN — DOCUSATE SODIUM 100 MG: 100 CAPSULE, LIQUID FILLED ORAL at 02:05

## 2025-05-10 RX ADMIN — METOPROLOL SUCCINATE 100 MG: 50 TABLET, EXTENDED RELEASE ORAL at 06:05

## 2025-05-10 RX ADMIN — OXYBUTYNIN CHLORIDE 5 MG: 5 TABLET ORAL at 08:05

## 2025-05-10 RX ADMIN — PREDNISONE 20 MG: 20 TABLET ORAL at 08:05

## 2025-05-10 RX ADMIN — BISACODYL 10 MG: 5 TABLET, COATED ORAL at 08:05

## 2025-05-10 RX ADMIN — MUPIROCIN 1 G: 20 OINTMENT TOPICAL at 08:05

## 2025-05-10 RX ADMIN — MYCOPHENOLATE MOFETIL 1000 MG: 250 CAPSULE ORAL at 08:05

## 2025-05-10 RX ADMIN — SEVELAMER CARBONATE 2400 MG: 800 TABLET, FILM COATED ORAL at 08:05

## 2025-05-10 RX ADMIN — TACROLIMUS 4 MG: 1 CAPSULE ORAL at 06:05

## 2025-05-10 RX ADMIN — TACROLIMUS 3 MG: 1 CAPSULE ORAL at 08:05

## 2025-05-10 RX ADMIN — OXYCODONE 5 MG: 5 TABLET ORAL at 08:05

## 2025-05-10 RX ADMIN — SEVELAMER CARBONATE 2400 MG: 800 TABLET, FILM COATED ORAL at 06:05

## 2025-05-10 RX ADMIN — THERA TABS 1 TABLET: TAB at 08:05

## 2025-05-10 RX ADMIN — NIFEDIPINE 30 MG: 30 TABLET, FILM COATED, EXTENDED RELEASE ORAL at 10:05

## 2025-05-10 RX ADMIN — OXYCODONE 5 MG: 5 TABLET ORAL at 05:05

## 2025-05-10 RX ADMIN — HEPARIN SODIUM 5000 UNITS: 5000 INJECTION INTRAVENOUS; SUBCUTANEOUS at 08:05

## 2025-05-10 RX ADMIN — FAMOTIDINE 20 MG: 20 TABLET, FILM COATED ORAL at 08:05

## 2025-05-10 RX ADMIN — HEPARIN SODIUM 5000 UNITS: 5000 INJECTION INTRAVENOUS; SUBCUTANEOUS at 01:05

## 2025-05-10 RX ADMIN — TACROLIMUS 1 MG: 1 CAPSULE ORAL at 10:05

## 2025-05-10 RX ADMIN — SEVELAMER CARBONATE 2400 MG: 800 TABLET, FILM COATED ORAL at 11:05

## 2025-05-10 NOTE — PLAN OF CARE
Pt aaox4 vswnl and Midodrine d/c on dayshift. Pt with no c/o pain. Pt ambulates with standby assist and family member in room who also pulled self meds with one mistake. Reviewed the blue card and meds. Accu ck at [phone] and pt refused 0200 accu ck stating she didn't want to be woke up. Rt Juwan charged/patent/intact. Midline incision anushka with Dermabond intact. Telemetry maintained NSR. Plan for permacath placement on MOnday. Lyon patent/ intact with scant uop. Plan on d/c lyon on day 5 which is 5/12. Pt has been anuric. No HD on dayshift. HD  prn labs. Pt c/o bladder spasms and few drops urine down her leg around the catheter while standing up. Oxybutynin given with relief.

## 2025-05-10 NOTE — PROGRESS NOTES
HR sustaining 120s-130s on telemetry monitor since returning from HD - tele leads changed. MONO Felton NP notified and STAT EKG-12 lead ordered.

## 2025-05-10 NOTE — SUBJECTIVE & OBJECTIVE
Subjective:   History of Present Illness:  Ms. Gabriel is a 24 y.o. White female who has ESRD 2/2 FSGS. She has had 2 previous kidney transplants (~ 2015 at [facility], 12/2020 at [facility], [place]). 1st kidney lost to hyper acute rejection and 2nd kidney due to infection. She is on PD since 2021, tolerating well, denies hx peritonitis. PMH also includes: seizure and hx PRES in setting of HTN emergency, decreased bladder capacity seen by urology. Anuric. She presents as a primary candidate for a kidney transplant. Reports being in her usual state of health, denies recent illnesses or hospitalizations. Per urology, needs oxybutynin POD1 and repeat urodynamics outpatient once bladder enlarges again to assess for hostile bladder at larger volumes. Surgery scheduled for 1100 with Dr. Stern. Pre op labs and imaging pending, will be reviewed prior to transplant     Ms. Gabriel is a 24 y.o. year old female who is status post Kidney Transplant - 5/7/2025  (#1).  Her maintenance immunosuppression consists of:   Immunosuppressants (From admission, onward)      Start     Stop Route Frequency Ordered    05/10/25 1800  tacrolimus capsule 4 mg  (IP TXP KIDNEY POST-OP THYMO WITH PERIPHERAL PRECHECKED IBW)         -- Oral 2 times daily 05/10/25 1022    05/07/25 2200  mycophenolate capsule 1,000 mg  (IP TXP KIDNEY POST-OP THYMO WITH PERIPHERAL PRECHECKED IBW)         -- Oral 2 times daily 05/07/25 1807                Hospital Course:  Patient s/p DBD Ktx (#3) and PD catheter removal on 5/7/25. 5 day lyon (U-U.small bladder + 4 week stent) and LILLIAN x 1. Patient with hyperkalemia POD 1 requiring HD. UOP dropping off, non responsive to IV lasix. Hold off on HD today. Will reassess tomorrow. Interventional nephrology consulted for perm cath placement for Monday 5/12. Hypotension in PACU, H/H stable. Midodrine TID for 24 hours with some improvement. D/c midodrine today and will monitor BP. TTE ordered to assess.     Interval history : POD 3 -  NAEON, AAO x 4 , VS, TTE reviewed and is satisfactory. Wound CDI, drain 175 serous, UO : 95cc, Creatinine uptrending, slight drop in HB to 7 from 8. Plan for HD and 1U PRBC transfusion with HD. Stop IV fluids.  Tolerating diet. Encouraged ambulation.   Also to add nifedipine for HTN        Past Medical, Surgical, Family, and Social History:   Unchanged from H&P.    Scheduled Meds:   bisacodyL  10 mg Oral QHS    docusate sodium  100 mg Oral TID    famotidine  20 mg Oral QHS    heparin (porcine)  5,000 Units Subcutaneous Q8H    multivitamin  1 tablet Oral Daily    mupirocin  1 g Nasal BID    mycophenolate  1,000 mg Oral BID    NIFEdipine  30 mg Oral Daily    oxybutynin  5 mg Oral TID    predniSONE  20 mg Oral Daily    sevelamer carbonate  2,400 mg Oral TID WM    [START ON 5/17/2025] sulfamethoxazole-trimethoprim 400-80mg  1 tablet Oral Daily AM    tacrolimus  4 mg Oral BID    [START ON 5/17/2025] valGANciclovir  450 mg Oral Daily AM     Continuous Infusions:   glucagon (human recombinant)  1 mg Intramuscular Continuous PRN         PRN Meds:  Current Facility-Administered Medications:     0.9%  NaCl infusion (for blood administration), , Intravenous, Q24H PRN    aluminum-magnesium hydroxide-simethicone, 30 mL, Oral, Q6H PRN    calcium carbonate, 500 mg, Oral, TID PRN    dextrose 50%, 12.5 g, Intravenous, PRN    diphenhydrAMINE, 50 mg, Oral, Once PRN    EPINEPHrine (PF), 1 mg, Subcutaneous, Once PRN    glucagon (human recombinant), 1 mg, Intramuscular, Continuous PRN    glucose, 16 g, Oral, PRN    glucose, 16 g, Oral, PRN    glucose, 24 g, Oral, PRN    heparin (porcine), 2,000 Units, Intra-Catheter, Daily PRN    hydrALAZINE, 10 mg, Intravenous, Q8H PRN    hydrocortisone sodium succinate, 100 mg, Intravenous, Once PRN    insulin aspart U-100, 0-5 Units, Subcutaneous, QID (AC + HS) PRN    ondansetron, 4 mg, Intravenous, Q6H PRN    oxyCODONE, 5 mg, Oral, Q4H PRN    oxyCODONE, 10 mg, Oral, Q4H PRN    simethicone, 1 tablet,  "Oral, TID PRN    sodium chloride 0.9%, 10 mL, Intravenous, PRN    Intake/Output - Last 3 Shifts         05/08 0700 05/09 0659 05/09 0700  05/10 0659 05/10 0700 05/11 0659    P.O. 660 660 480    Other 300      IV Piggyback 471.7      Total Intake(mL/kg) 1431.7 (23.6) 660 (9.8) 480 (7.1)    Urine (mL/kg/hr) 45 (0) 95 (0.1) 50 (0.1)    Emesis/NG output       Drains 180 175 90    Other 500      Stool 0      Blood       Total Output 725 270 140    Net +706.7 +390 +340           Unmeasured Urine Occurrence 0 x      Unmeasured Stool Occurrence 0 x               Review of Systems   Constitutional:  Negative for activity change, appetite change and fever.   Respiratory:  Negative for cough and shortness of breath.    Cardiovascular:  Negative for chest pain and leg swelling.   Gastrointestinal:  Positive for abdominal pain. Negative for abdominal distention, diarrhea, nausea and vomiting.   Genitourinary:  Positive for decreased urine volume. Negative for difficulty urinating.   Skin:  Negative for wound.   Allergic/Immunologic: Positive for immunocompromised state.   Neurological:  Negative for dizziness and weakness.      Objective:     Vital Signs (Most Recent):  Temp: 98.4 °F (36.9 °C) (05/10/25 1112)  Pulse: 75 (05/10/25 1114)  Resp: 18 (05/10/25 1112)  BP: (!) 135/95 (05/10/25 1112)  SpO2: 96 % (05/10/25 1112) Vital Signs (24h Range):  Temp:  [97.7 °F (36.5 °C)-98.7 °F (37.1 °C)] 98.4 °F (36.9 °C)  Pulse:  [64-98] 75  Resp:  [18] 18  SpO2:  [93 %-96 %] 96 %  BP: (121-154)/() 135/95     Weight: (P) 67.2 kg (148 lb 2.4 oz)  Height: 5' 2" (157.5 cm)  Body mass index is 27.1 kg/m² (pended).     Physical Exam  Vitals reviewed.   Constitutional:       Appearance: She is well-developed.   Eyes:      Pupils: Pupils are equal, round, and reactive to light.   Cardiovascular:      Rate and Rhythm: Normal rate and regular rhythm.      Heart sounds: No murmur heard.  Pulmonary:      Effort: Pulmonary effort is normal. No " respiratory distress.      Breath sounds: Normal breath sounds. No wheezing.   Abdominal:      General: Bowel sounds are normal. There is no distension.      Palpations: Abdomen is soft.      Tenderness: There is abdominal tenderness. There is no guarding.      Comments: Midline incision with dermabond  LILLIAN x 1- ss drainage   Musculoskeletal:         General: Normal range of motion.      Cervical back: Normal range of motion.   Skin:     General: Skin is warm and dry.   Neurological:      Mental Status: She is alert and oriented to person, place, and time.   Psychiatric:         Mood and Affect: Mood normal.         Behavior: Behavior normal.         Thought Content: Thought content normal.         Judgment: Judgment normal.          Laboratory:  CBC:   Recent Labs   Lab 05/08/25  1447 05/09/25  0550 05/10/25  0523   WBC 9.96 6.76 5.54   RBC 2.69* 2.58* 2.32*   HGB 8.4* 8.0* 7.0*   HCT 27.2* 26.3* 23.2*    237 238   * 102* 100*   MCH 31.2* 31.0 30.2   MCHC 30.9* 30.4* 30.2*     BMP:   Recent Labs   Lab 05/08/25  1447 05/09/25  0550 05/10/25  0523    109 92   * 137 138   K 4.2 5.0 4.3   CL 99 102 101   CO2 26 24 23   BUN 22* 32* 52*   CREATININE 5.2* 6.5* 7.2*   CALCIUM 8.3* 8.4* 8.2*       Diagnostic Results:  US - Kidney: Results for orders placed during the hospital encounter of 05/07/25    US Transplant Kidney With Doppler    Narrative  EXAMINATION:  US TRANSPLANT KIDNEY WITH DOPPLER    CLINICAL HISTORY:  Hyperkalemia and oliguria following kidney transplant;    TECHNIQUE:  Real time gray scale and doppler ultrasound was performed over the patient's renal allograft.    COMPARISON:  Ultrasound from 05/07/2025.    FINDINGS:  Renal allograft in the right lower quadrant.  The allograft measures 11.3 cm. Normal perfusion. No hydronephrosis.    Fluid collection adjacent to the superior pole measuring 5.7 x 3.2 x 1.7 cm.    Vasculature:    Resistive indices ranged from 0.75 to 0.81.    Main  renal artery peak systolic velocity: 136cm/sec with normal waveform.    Renal artery/iliac ratio: 1.0.    The main renal vein is patent.  Peak velocity of the main renal vein measures 191 cm/sec.    Impression  Mildly elevated intraparenchymal resistive indices which may be seen with edema ATN recent postoperative setting.  Otherwise satisfactory Doppler evaluation.    Fluid collection adjacent to the superior pole.  No significant mass effect.      Electronically signed by: Danny Pedroza MD  Date:    05/08/2025  Time:    13:51

## 2025-05-10 NOTE — PROGRESS NOTES
05/10/25 1442   During Hemodialysis Assessment   Blood Flow Rate (mL/min) 400 mL/min   Dialysate Flow Rate (mL/min) 700 ml/min   Ultrafiltration Rate (mL/Hr) 1220 mL/Hr   Arteriovenous Lines Secure Yes   Arterial Pressure (mmHg) -130 mmHg   Venous Pressure (mmHg) 180   Blood Volume Processed (Liters) 0 L   UF Removed (mL) 0 mL   TMP 10   Venous Line in Air Detector Yes   Intake (mL) 250 mL   Intra-Hemodialysis Comments HD started     Patient arrived SHAWANDA by wheelchair. HD started.

## 2025-05-10 NOTE — CARE UPDATE
-Glucose Goal 140-180    -A1C:   Hemoglobin A1C   Date Value Ref Range Status   04/09/2025 4.3 4.2 - 6.0 % Final     Comment:     For evaluation of glycemic control in known diabetic non-pregnant adults:      A1C <7% lower or higher target A1C values maybe appropriate for individual patients.  For the diagnosis of diabetes mellitus: A1C >/=6.5%  Increased risk for diabetes mellitus:   A1C 5.7-6.4%         -HOME REGIMEN:     -GLUCOSE TREND FOR THE PAST 24HRS:   Recent Labs   Lab 05/08/25  2113 05/09/25  0801 05/09/25  1248 05/09/25  1723 05/09/25  2046 05/10/25  0731   POCTGLUCOSE 146* 122* 114* 132* 163* 90         -NO HYPOGYCEMIAS NOTED     - Diet  Diet Renal Non-Dialysis    -TOLERATING 50 % OF PO DIET     -Steroids - Prednisone 20 mg         Plan:   Patient denies a history of DM. BG has been within goal without insulin requirements with diet advanced. Given this, endocrine will sign off at this time. Please do not hesitate to reach out for any BG related questions.

## 2025-05-10 NOTE — ASSESSMENT & PLAN NOTE
- s/p DBD Ktx (#3) and PD cath removal 5/7  - 5 day lyon (U-U/small bladder). 4 week stent. LILLIAN x 1- ss drainage  - HD POD 1 for hyperkalemia  - making minimal urine. Unresponsive to lasix.   - HD today. Also 1U PRBC  - also to start nifedipine  - Interventional Nephrology consulted for perm cath placement on Monday

## 2025-05-10 NOTE — PROGRESS NOTES
05/10/25 1733   During Hemodialysis Assessment   Blood Flow Rate (mL/min) 400 mL/min   Dialysate Flow Rate (mL/min) 700 ml/min   Ultrafiltration Rate (mL/Hr) 700 mL/Hr   Arteriovenous Lines Secure Yes   Arterial Pressure (mmHg) -130 mmHg   Venous Pressure (mmHg) 180   Blood Volume Processed (Liters) 58.3 L   UF Removed (mL) 3383 mL   TMP 10   Venous Line in Air Detector Yes   Intake (mL) 250 mL   Intra-Hemodialysis Comments HR high, patient denies any discomfort, HD ended 15 min early, MD notified.   Post-Hemodialysis Assessment   Rinseback Volume (mL) 250 mL   Blood Volume Processed (Liters) 58.3 L   Dialyzer Clearance Heavily streaked   Duration of Treatment 165 minutes   Total UF (mL) 3383 mL   Net Fluid Removal 2733   Patient Response to Treatment [place]. well   Post-Treatment Weight 64.5 kg (142 lb 3.2 oz)   Treatment Weight Change -2.8     HD completed. Patient left SHAWANDA by wheelchair in NAD/VSS.

## 2025-05-10 NOTE — PROGRESS NOTES
[facility]plant [facility]  Kidney Transplant  Progress Note      Reason for Follow-up: Reassessment of Kidney Transplant - 5/7/2025  (#1) recipient and management of immunosuppression.    ORGAN: LEFT KIDNEY      Donor Type: Donation after Brain Death      Donor CMV Status: Positive  Donor HBcAB:Negative  Donor HCV Status:Negative  Donor HBV JIE:   Donor HCV JIE: Negative      Subjective:   History of Present Illness:  Ms. Gabriel is a 24 y.o. White female who has ESRD 2/2 FSGS. She has had 2 previous kidney transplants (~ 2015 at [facility], 12/2020 at [facility], [place]). 1st kidney lost to hyper acute rejection and 2nd kidney due to infection. She is on PD since 2021, tolerating well, denies hx peritonitis. PMH also includes: seizure and hx PRES in setting of HTN emergency, decreased bladder capacity seen by urology. Anuric. She presents as a primary candidate for a kidney transplant. Reports being in her usual state of health, denies recent illnesses or hospitalizations. Per urology, needs oxybutynin POD1 and repeat urodynamics outpatient once bladder enlarges again to assess for hostile bladder at larger volumes. Surgery scheduled for 1100 with Dr. Stern. Pre op labs and imaging pending, will be reviewed prior to transplant     Ms. Gabriel is a 24 y.o. year old female who is status post Kidney Transplant - 5/7/2025  (#1).  Her maintenance immunosuppression consists of:   Immunosuppressants (From admission, onward)      Start     Stop Route Frequency Ordered    05/10/25 1800  tacrolimus capsule 4 mg  (IP TXP KIDNEY POST-OP THYMO WITH PERIPHERAL PRECHECKED IBW)         -- Oral 2 times daily 05/10/25 1022    05/07/25 2200  mycophenolate capsule 1,000 mg  (IP TXP KIDNEY POST-OP THYMO WITH PERIPHERAL PRECHECKED IBW)         -- Oral 2 times daily 05/07/25 1807                Hospital Course:  Patient s/p DBD Ktx (#3) and PD catheter removal on 5/7/25. 5 day lyon (U-U.small bladder + 4 week stent) and LILLIAN x 1. Patient with  hyperkalemia POD 1 requiring HD. UOP dropping off, non responsive to IV lasix. Hold off on HD today. Will reassess tomorrow. Interventional nephrology consulted for perm cath placement for Monday 5/12. Hypotension in PACU, H/H stable. Midodrine TID for 24 hours with some improvement. D/c midodrine today and will monitor BP. TTE ordered to assess.     Interval history : POD 3 - NAEON, AAO x 4 , VS, TTE reviewed and is satisfactory. Wound CDI, drain 175 serous, UO : 95cc, Creatinine uptrending, slight drop in HB to 7 from 8. Plan for HD and 1U PRBC transfusion with HD. Stop IV fluids.  Tolerating diet. Encouraged ambulation.   Also to add nifedipine for HTN        Past Medical, Surgical, Family, and Social History:   Unchanged from H&P.    Scheduled Meds:   bisacodyL  10 mg Oral QHS    docusate sodium  100 mg Oral TID    famotidine  20 mg Oral QHS    heparin (porcine)  5,000 Units Subcutaneous Q8H    multivitamin  1 tablet Oral Daily    mupirocin  1 g Nasal BID    mycophenolate  1,000 mg Oral BID    NIFEdipine  30 mg Oral Daily    oxybutynin  5 mg Oral TID    predniSONE  20 mg Oral Daily    sevelamer carbonate  2,400 mg Oral TID WM    [START ON 5/17/2025] sulfamethoxazole-trimethoprim 400-80mg  1 tablet Oral Daily AM    tacrolimus  4 mg Oral BID    [START ON 5/17/2025] valGANciclovir  450 mg Oral Daily AM     Continuous Infusions:   glucagon (human recombinant)  1 mg Intramuscular Continuous PRN         PRN Meds:  Current Facility-Administered Medications:     0.9%  NaCl infusion (for blood administration), , Intravenous, Q24H PRN    aluminum-magnesium hydroxide-simethicone, 30 mL, Oral, Q6H PRN    calcium carbonate, 500 mg, Oral, TID PRN    dextrose 50%, 12.5 g, Intravenous, PRN    diphenhydrAMINE, 50 mg, Oral, Once PRN    EPINEPHrine (PF), 1 mg, Subcutaneous, Once PRN    glucagon (human recombinant), 1 mg, Intramuscular, Continuous PRN    glucose, 16 g, Oral, PRN    glucose, 16 g, Oral, PRN    glucose, 24 g, Oral,  "PRN    heparin (porcine), 2,000 Units, Intra-Catheter, Daily PRN    hydrALAZINE, 10 mg, Intravenous, Q8H PRN    hydrocortisone sodium succinate, 100 mg, Intravenous, Once PRN    insulin aspart U-100, 0-5 Units, Subcutaneous, QID (AC + HS) PRN    ondansetron, 4 mg, Intravenous, Q6H PRN    oxyCODONE, 5 mg, Oral, Q4H PRN    oxyCODONE, 10 mg, Oral, Q4H PRN    simethicone, 1 tablet, Oral, TID PRN    sodium chloride 0.9%, 10 mL, Intravenous, PRN    Intake/Output - Last 3 Shifts         05/08 0700  05/09 0659 05/09 0700  05/10 0659 05/10 0700 05/11 0659    P.O. 660 660 480    Other 300      IV Piggyback 471.7      Total Intake(mL/kg) 1431.7 (23.6) 660 (9.8) 480 (7.1)    Urine (mL/kg/hr) 45 (0) 95 (0.1) 50 (0.1)    Emesis/NG output       Drains 180 175 90    Other 500      Stool 0      Blood       Total Output 725 270 140    Net +706.7 +390 +340           Unmeasured Urine Occurrence 0 x      Unmeasured Stool Occurrence 0 x               Review of Systems   Constitutional:  Negative for activity change, appetite change and fever.   Respiratory:  Negative for cough and shortness of breath.    Cardiovascular:  Negative for chest pain and leg swelling.   Gastrointestinal:  Positive for abdominal pain. Negative for abdominal distention, diarrhea, nausea and vomiting.   Genitourinary:  Positive for decreased urine volume. Negative for difficulty urinating.   Skin:  Negative for wound.   Allergic/Immunologic: Positive for immunocompromised state.   Neurological:  Negative for dizziness and weakness.      Objective:     Vital Signs (Most Recent):  Temp: 98.4 °F (36.9 °C) (05/10/25 1112)  Pulse: 75 (05/10/25 1114)  Resp: 18 (05/10/25 1112)  BP: (!) 135/95 (05/10/25 1112)  SpO2: 96 % (05/10/25 1112) Vital Signs (24h Range):  Temp:  [97.7 °F (36.5 °C)-98.7 °F (37.1 °C)] 98.4 °F (36.9 °C)  Pulse:  [64-98] 75  Resp:  [18] 18  SpO2:  [93 %-96 %] 96 %  BP: (121-154)/() 135/95     Weight: (P) 67.2 kg (148 lb 2.4 oz)  Height: 5' 2" " (157.5 cm)  Body mass index is 27.1 kg/m² (pended).     Physical Exam  Vitals reviewed.   Constitutional:       Appearance: She is well-developed.   Eyes:      Pupils: Pupils are equal, round, and reactive to light.   Cardiovascular:      Rate and Rhythm: Normal rate and regular rhythm.      Heart sounds: No murmur heard.  Pulmonary:      Effort: Pulmonary effort is normal. No respiratory distress.      Breath sounds: Normal breath sounds. No wheezing.   Abdominal:      General: Bowel sounds are normal. There is no distension.      Palpations: Abdomen is soft.      Tenderness: There is abdominal tenderness. There is no guarding.      Comments: Midline incision with dermabond  LILLIAN x 1- ss drainage   Musculoskeletal:         General: Normal range of motion.      Cervical back: Normal range of motion.   Skin:     General: Skin is warm and dry.   Neurological:      Mental Status: She is alert and oriented to person, place, and time.   Psychiatric:         Mood and Affect: Mood normal.         Behavior: Behavior normal.         Thought Content: Thought content normal.         Judgment: Judgment normal.          Laboratory:  CBC:   Recent Labs   Lab 05/08/25  1447 05/09/25  0550 05/10/25  0523   WBC 9.96 6.76 5.54   RBC 2.69* 2.58* 2.32*   HGB 8.4* 8.0* 7.0*   HCT 27.2* 26.3* 23.2*    237 238   * 102* 100*   MCH 31.2* 31.0 30.2   MCHC 30.9* 30.4* 30.2*     BMP:   Recent Labs   Lab 05/08/25  1447 05/09/25  0550 05/10/25  0523    109 92   * 137 138   K 4.2 5.0 4.3   CL 99 102 101   CO2 26 24 23   BUN 22* 32* 52*   CREATININE 5.2* 6.5* 7.2*   CALCIUM 8.3* 8.4* 8.2*       Diagnostic Results:  US - Kidney: Results for orders placed during the hospital encounter of 05/07/25    US Transplant Kidney With Doppler    Narrative  EXAMINATION:  US TRANSPLANT KIDNEY WITH DOPPLER    CLINICAL HISTORY:  Hyperkalemia and oliguria following kidney transplant;    TECHNIQUE:  Real time gray scale and doppler  "ultrasound was performed over the patient's renal allograft.    COMPARISON:  Ultrasound from 2025.    FINDINGS:  Renal allograft in the right lower quadrant.  The allograft measures 11.3 cm. Normal perfusion. No hydronephrosis.    Fluid collection adjacent to the superior pole measuring 5.7 x 3.2 x 1.7 cm.    Vasculature:    Resistive indices ranged from 0.75 to 0.81.    Main renal artery peak systolic velocity: 136cm/sec with normal waveform.    Renal artery/iliac ratio: 1.0.    The main renal vein is patent.  Peak velocity of the main renal vein measures 191 cm/sec.    Impression  Mildly elevated intraparenchymal resistive indices which may be seen with edema ATN recent postoperative setting.  Otherwise satisfactory Doppler evaluation.    Fluid collection adjacent to the superior pole.  No significant mass effect.      Electronically signed by: Danny Pedroza MD  Date:    2025  Time:    13:51  Assessment/Plan:     * FSGS (focal segmental glomerulosclerosis)  - daily urine Pr/Cr      Prophylactic immunotherapy  - Continue Cellcept and Prograf. Will monitor for signs of toxic side effects, check daily tacrolimus troughs, and change meds accordingly.       Long-term use of immunosuppressant medication  - see "prophylactic immuno"      At risk for opportunistic infections  - Valcyte for CMV prophylaxis  - Bactrim for PCP prophylaxis   To be started on POD 10      Status post -donor kidney transplantation  - s/p DBD Ktx (#3) and PD cath removal   - 5 day lyon (U-U/small bladder). 4 week stent. LILLIAN x 1- ss drainage  - HD POD 1 for hyperkalemia  - making minimal urine. Unresponsive to lasix.   - HD today. Also 1U PRBC  - also to start nifedipine  - Interventional Nephrology consulted for perm cath placement on Monday        Hyperglycemia  - endocrine consulted for BG management       Failed kidney transplant x2        Anemia in chronic kidney disease, on chronic dialysis  - h/h stable   - monitor " with CBC       Benign hypertension with ESRD (end-stage renal disease)  - w history of PRESS 2/2 hypertensive emergency   - monitor closely, add anti-hypertensive agents as needed  *had hypotension post-op requiring midodrine. Received Midodrine for 24 hrs, dc'd 5/9.  Monitor closely for elevated BP  - Obtain TTE            Discharge Planning:  Not ready for discharge today  Medical decision making for this encounter includes review of pertinent labs and diagnostic studies, assessment and planning, discussions with consulting providers, discussion with patient/family, and participation in multidisciplinary rounds. Time spent caring for patient: 30 minutes    Mamie Ibarra MD  Kidney Transplant  [facility] - Transplant Stepdown

## 2025-05-10 NOTE — PLAN OF CARE
AAOx4. VSS. Patient up OOB in room, chair, and bathroom w/ standby assist. Orthostatics checked - patient is not orthostatic (see flowsheet). PO nifedipine 30mg and PO lopressor 100mg started daily. Cr 7.2 (increased from 6.5 prior) on AM labs. Roman to gravity in place w/ minimal dark yellow urine noted - see flowsheet for exact UOP amount. HD done today 5/10 - 2.8L removed. H/H 7.0/23.2 (decreased from 8.0/26.3 prior) - 1 unit PRBCs transfused this shift w/ HD. IR consulted for permcath placement on Monday 5/12. Midline incision LAKIA w/ dermabond intact. RLQ LILLIAN drain w/ SS output noted - see flowsheet for exact output amount. Patient is passing gas, but no BM yet. Ambulation encouraged throughout shift. Blue-card updated and self-meds stocked. Non-skid socks on. Bed in low position. Call light within reach.

## 2025-05-11 LAB
ABSOLUTE EOSINOPHIL (OHS): 0.06 K/UL
ABSOLUTE MONOCYTE (OHS): 0.67 K/UL (ref 0.3–1)
ABSOLUTE NEUTROPHIL COUNT (OHS): 4.8 K/UL (ref 1.8–7.7)
ALBUMIN SERPL BCP-MCNC: 2.3 G/DL (ref 3.5–5.2)
ANION GAP (OHS): 12 MMOL/L (ref 8–16)
APTT PPP: 26.6 SECONDS (ref 21–32)
BASOPHILS # BLD AUTO: 0.01 K/UL
BASOPHILS NFR BLD AUTO: 0.2 %
BUN SERPL-MCNC: 27 MG/DL (ref 6–20)
CALCIUM SERPL-MCNC: 8.5 MG/DL (ref 8.7–10.5)
CHLORIDE SERPL-SCNC: 103 MMOL/L (ref 95–110)
CO2 SERPL-SCNC: 23 MMOL/L (ref 23–29)
CREAT SERPL-MCNC: 4.3 MG/DL (ref 0.5–1.4)
ERYTHROCYTE [DISTWIDTH] IN BLOOD BY AUTOMATED COUNT: 17.7 % (ref 11.5–14.5)
GFR SERPLBLD CREATININE-BSD FMLA CKD-EPI: 14 ML/MIN/1.73/M2
GLUCOSE SERPL-MCNC: 83 MG/DL (ref 70–110)
HCT VFR BLD AUTO: 28.6 % (ref 37–48.5)
HGB BLD-MCNC: 8.9 GM/DL (ref 12–16)
IMM GRANULOCYTES # BLD AUTO: 0.02 K/UL (ref 0–0.04)
IMM GRANULOCYTES NFR BLD AUTO: 0.3 % (ref 0–0.5)
INR PPP: 1 (ref 0.8–1.2)
LYMPHOCYTES # BLD AUTO: 0.43 K/UL (ref 1–4.8)
MAGNESIUM SERPL-MCNC: 2.4 MG/DL (ref 1.6–2.6)
MCH RBC QN AUTO: 29.5 PG (ref 27–31)
MCHC RBC AUTO-ENTMCNC: 31.1 G/DL (ref 32–36)
MCV RBC AUTO: 95 FL (ref 82–98)
NUCLEATED RBC (/100WBC) (OHS): 0 /100 WBC
OHS QRS DURATION: 82 MS
OHS QTC CALCULATION: 468 MS
PHOSPHATE SERPL-MCNC: 5.2 MG/DL (ref 2.7–4.5)
PLATELET # BLD AUTO: 235 K/UL (ref 150–450)
PMV BLD AUTO: 8.8 FL (ref 9.2–12.9)
POTASSIUM SERPL-SCNC: 4.2 MMOL/L (ref 3.5–5.1)
PROTHROMBIN TIME: 11.4 SECONDS (ref 9–12.5)
RBC # BLD AUTO: 3.02 M/UL (ref 4–5.4)
RELATIVE EOSINOPHIL (OHS): 1 %
RELATIVE LYMPHOCYTE (OHS): 7.2 % (ref 18–48)
RELATIVE MONOCYTE (OHS): 11.2 % (ref 4–15)
RELATIVE NEUTROPHIL (OHS): 80.1 % (ref 38–73)
SODIUM SERPL-SCNC: 138 MMOL/L (ref 136–145)
TACROLIMUS BLD-MCNC: 9.5 NG/ML (ref 5–15)
WBC # BLD AUTO: 5.99 K/UL (ref 3.9–12.7)

## 2025-05-11 PROCEDURE — 80069 RENAL FUNCTION PANEL: CPT | Performed by: STUDENT IN AN ORGANIZED HEALTH CARE EDUCATION/TRAINING PROGRAM

## 2025-05-11 PROCEDURE — 85025 COMPLETE CBC W/AUTO DIFF WBC: CPT | Performed by: STUDENT IN AN ORGANIZED HEALTH CARE EDUCATION/TRAINING PROGRAM

## 2025-05-11 PROCEDURE — 25000003 PHARM REV CODE 250: Performed by: TRANSPLANT SURGERY

## 2025-05-11 PROCEDURE — 36415 COLL VENOUS BLD VENIPUNCTURE: CPT | Performed by: TRANSPLANT SURGERY

## 2025-05-11 PROCEDURE — 25000003 PHARM REV CODE 250: Performed by: STUDENT IN AN ORGANIZED HEALTH CARE EDUCATION/TRAINING PROGRAM

## 2025-05-11 PROCEDURE — 20600001 HC STEP DOWN PRIVATE ROOM

## 2025-05-11 PROCEDURE — 85730 THROMBOPLASTIN TIME PARTIAL: CPT | Performed by: TRANSPLANT SURGERY

## 2025-05-11 PROCEDURE — 80197 ASSAY OF TACROLIMUS: CPT | Performed by: STUDENT IN AN ORGANIZED HEALTH CARE EDUCATION/TRAINING PROGRAM

## 2025-05-11 PROCEDURE — 99233 SBSQ HOSP IP/OBS HIGH 50: CPT | Mod: ,,, | Performed by: NURSE PRACTITIONER

## 2025-05-11 PROCEDURE — 63600175 PHARM REV CODE 636 W HCPCS: Performed by: STUDENT IN AN ORGANIZED HEALTH CARE EDUCATION/TRAINING PROGRAM

## 2025-05-11 PROCEDURE — 25000003 PHARM REV CODE 250: Performed by: PHYSICIAN ASSISTANT

## 2025-05-11 PROCEDURE — 83735 ASSAY OF MAGNESIUM: CPT | Performed by: STUDENT IN AN ORGANIZED HEALTH CARE EDUCATION/TRAINING PROGRAM

## 2025-05-11 PROCEDURE — 25000003 PHARM REV CODE 250: Performed by: NURSE PRACTITIONER

## 2025-05-11 PROCEDURE — 85610 PROTHROMBIN TIME: CPT | Performed by: TRANSPLANT SURGERY

## 2025-05-11 PROCEDURE — 27000207 HC ISOLATION

## 2025-05-11 RX ORDER — NIFEDIPINE 30 MG/1
30 TABLET, EXTENDED RELEASE ORAL DAILY
Status: DISCONTINUED | OUTPATIENT
Start: 2025-05-12 | End: 2025-05-13 | Stop reason: HOSPADM

## 2025-05-11 RX ORDER — VALGANCICLOVIR 450 MG/1
450 TABLET, FILM COATED ORAL
Qty: 60 TABLET | Refills: 5 | Status: SHIPPED | OUTPATIENT
Start: 2025-05-12 | End: 2025-11-08

## 2025-05-11 RX ORDER — NIFEDIPINE 30 MG/1
30 TABLET, EXTENDED RELEASE ORAL DAILY
Qty: 30 TABLET | Refills: 11 | Status: SHIPPED | OUTPATIENT
Start: 2025-05-12 | End: 2026-05-12

## 2025-05-11 RX ORDER — SODIUM CHLORIDE 9 MG/ML
INJECTION, SOLUTION INTRAVENOUS
OUTPATIENT
Start: 2025-05-12

## 2025-05-11 RX ORDER — FAMOTIDINE 20 MG/1
20 TABLET, FILM COATED ORAL NIGHTLY
Qty: 30 TABLET | Refills: 0 | Status: SHIPPED | OUTPATIENT
Start: 2025-05-08 | End: 2025-06-12

## 2025-05-11 RX ORDER — SULFAMETHOXAZOLE AND TRIMETHOPRIM 400; 80 MG/1; MG/1
1 TABLET ORAL
Qty: 12 TABLET | Refills: 5 | Status: SHIPPED | OUTPATIENT
Start: 2025-05-12 | End: 2025-11-08

## 2025-05-11 RX ORDER — SEVELAMER CARBONATE 800 MG/1
2400 TABLET, FILM COATED ORAL
Qty: 270 TABLET | Refills: 11 | Status: SHIPPED | OUTPATIENT
Start: 2025-05-11 | End: 2025-05-12 | Stop reason: HOSPADM

## 2025-05-11 RX ORDER — HYDRALAZINE HYDROCHLORIDE 20 MG/ML
10 INJECTION INTRAMUSCULAR; INTRAVENOUS EVERY 8 HOURS PRN
Status: DISCONTINUED | OUTPATIENT
Start: 2025-05-11 | End: 2025-05-13 | Stop reason: HOSPADM

## 2025-05-11 RX ADMIN — MYCOPHENOLATE MOFETIL 1000 MG: 250 CAPSULE ORAL at 08:05

## 2025-05-11 RX ADMIN — METOPROLOL SUCCINATE 100 MG: 50 TABLET, EXTENDED RELEASE ORAL at 08:05

## 2025-05-11 RX ADMIN — DOCUSATE SODIUM 100 MG: 100 CAPSULE, LIQUID FILLED ORAL at 08:05

## 2025-05-11 RX ADMIN — PREDNISONE 20 MG: 20 TABLET ORAL at 08:05

## 2025-05-11 RX ADMIN — TACROLIMUS 4 MG: 1 CAPSULE ORAL at 05:05

## 2025-05-11 RX ADMIN — TACROLIMUS 4 MG: 1 CAPSULE ORAL at 08:05

## 2025-05-11 RX ADMIN — MUPIROCIN 1 G: 20 OINTMENT TOPICAL at 08:05

## 2025-05-11 RX ADMIN — OXYCODONE 5 MG: 5 TABLET ORAL at 08:05

## 2025-05-11 RX ADMIN — HEPARIN SODIUM 5000 UNITS: 5000 INJECTION INTRAVENOUS; SUBCUTANEOUS at 05:05

## 2025-05-11 RX ADMIN — SEVELAMER CARBONATE 2400 MG: 800 TABLET, FILM COATED ORAL at 05:05

## 2025-05-11 RX ADMIN — BISACODYL 10 MG: 5 TABLET, COATED ORAL at 08:05

## 2025-05-11 RX ADMIN — HEPARIN SODIUM 5000 UNITS: 5000 INJECTION INTRAVENOUS; SUBCUTANEOUS at 01:05

## 2025-05-11 RX ADMIN — OXYBUTYNIN CHLORIDE 5 MG: 5 TABLET ORAL at 08:05

## 2025-05-11 RX ADMIN — SEVELAMER CARBONATE 2400 MG: 800 TABLET, FILM COATED ORAL at 08:05

## 2025-05-11 RX ADMIN — OXYBUTYNIN CHLORIDE 5 MG: 5 TABLET ORAL at 02:05

## 2025-05-11 RX ADMIN — FAMOTIDINE 20 MG: 20 TABLET, FILM COATED ORAL at 08:05

## 2025-05-11 RX ADMIN — NIFEDIPINE 30 MG: 30 TABLET, FILM COATED, EXTENDED RELEASE ORAL at 08:05

## 2025-05-11 RX ADMIN — THERA TABS 1 TABLET: TAB at 08:05

## 2025-05-11 RX ADMIN — DOCUSATE SODIUM 100 MG: 100 CAPSULE, LIQUID FILLED ORAL at 02:05

## 2025-05-11 NOTE — PLAN OF CARE
AAOx4. VSS. Patient up OOB independently in room and bathroom. Patient ambulated in hallways this shift. Cr 4.3 (decreased from 7.2 prior) s/p HD done yesterday 5/11 w/ 2.8L removed. H/H stable (8.9/28.6) s/p 1 unit PRBCs transfused yesterday. Midline incision LAKIA w/ dermabond intact. RLQ LILLIAN drain in place w/ SS output noted - see flowsheet for exact output amount. [place] to gravity in place w/ minimal brown urine noted - see flowsheet for exact UOP amount. Orders placed to remove lyon tomorrow 5/12 at 0600. BM x1 this shift. IR consulted for permcath placement tomorrow 5/12 - patient to be NPO at midnight tonight. HD ordered for tomorrow 5/12. Non-skid socks on. Bed in low position. Call light within reach. Mother at bedside.

## 2025-05-11 NOTE — SUBJECTIVE & OBJECTIVE
Subjective:   History of Present Illness:  Ms. Gabriel is a 24 y.o. White female who has ESRD 2/2 FSGS. She has had 2 previous kidney transplants (~ 2015 at [facility], 12/2020 at [facility], [place]). 1st kidney lost to hyper acute rejection and 2nd kidney due to infection. She is on PD since 2021, tolerating well, denies hx peritonitis. PMH also includes: seizure and hx PRES in setting of HTN emergency, decreased bladder capacity seen by urology. Anuric. She presents as a primary candidate for a kidney transplant. Reports being in her usual state of health, denies recent illnesses or hospitalizations. Per urology, needs oxybutynin POD1 and repeat urodynamics outpatient once bladder enlarges again to assess for hostile bladder at larger volumes. Surgery scheduled for 1100 with Dr. Stern. Pre op labs and imaging pending, will be reviewed prior to transplant     Ms. Gabriel is a 24 y.o. year old female who is status post Kidney Transplant - 5/7/2025  (#1).  Her maintenance immunosuppression consists of:   Immunosuppressants (From admission, onward)      Start     Stop Route Frequency Ordered    05/10/25 1800  tacrolimus capsule 4 mg  (IP TXP KIDNEY POST-OP THYMO WITH PERIPHERAL PRECHECKED IBW)         -- Oral 2 times daily 05/10/25 1022    05/07/25 2200  mycophenolate capsule 1,000 mg  (IP TXP KIDNEY POST-OP THYMO WITH PERIPHERAL PRECHECKED IBW)         -- Oral 2 times daily 05/07/25 1807          Hospital Course:  Patient s/p DBD Ktx (#3) and PD catheter removal on 5/7/25. 5 day lyon (U-U.small bladder + 4 week stent) and LILLIAN x 1. Patient with hyperkalemia POD 1 requiring HD. UOP dropping off, non responsive to IV lasix. Hold off on HD today. Will reassess tomorrow. Interventional nephrology consulted for perm cath placement for Monday 5/12. Hypotension in PACU, H/H stable. Midodrine TID for 24 hours with some improvement. D/c midodrine today and will monitor BP. TTE ordered to assess.     Interval history : POD 4 - patient  had HD on 5/10 with 1 unit prbc  now Hgb 8.9 from from 7.3, post HD  had approx 2 hours of SVT - added back Metoprolol 100mg and now NSR 70's (of note patient was taking 150mg BID Met XL at home), Wound CDI, drain 180 serous, UO : 140cc,   Tolerating diet. Encouraged ambulation, lyon to be removed on Monday.       Past Medical, Surgical, Family, and Social History:   Unchanged from H&P.    Scheduled Meds:   bisacodyL  10 mg Oral QHS    docusate sodium  100 mg Oral TID    famotidine  20 mg Oral QHS    heparin (porcine)  5,000 Units Subcutaneous Q8H    metoprolol succinate  100 mg Oral Daily    multivitamin  1 tablet Oral Daily    mupirocin  1 g Nasal BID    mycophenolate  1,000 mg Oral BID    NIFEdipine  30 mg Oral Daily    oxybutynin  5 mg Oral TID    predniSONE  20 mg Oral Daily    sevelamer carbonate  2,400 mg Oral TID WM    [START ON 5/17/2025] sulfamethoxazole-trimethoprim 400-80mg  1 tablet Oral Daily AM    tacrolimus  4 mg Oral BID    [START ON 5/17/2025] valGANciclovir  450 mg Oral Daily AM     Continuous Infusions:   glucagon (human recombinant)  1 mg Intramuscular Continuous PRN         PRN Meds:  Current Facility-Administered Medications:     0.9%  NaCl infusion (for blood administration), , Intravenous, Q24H PRN    aluminum-magnesium hydroxide-simethicone, 30 mL, Oral, Q6H PRN    calcium carbonate, 500 mg, Oral, TID PRN    dextrose 50%, 12.5 g, Intravenous, PRN    diphenhydrAMINE, 50 mg, Oral, Once PRN    EPINEPHrine (PF), 1 mg, Subcutaneous, Once PRN    glucagon (human recombinant), 1 mg, Intramuscular, Continuous PRN    glucose, 16 g, Oral, PRN    glucose, 16 g, Oral, PRN    glucose, 24 g, Oral, PRN    heparin (porcine), 2,000 Units, Intra-Catheter, Daily PRN    hydrALAZINE, 10 mg, Intravenous, Q8H PRN    hydrocortisone sodium succinate, 100 mg, Intravenous, Once PRN    insulin aspart U-100, 0-5 Units, Subcutaneous, QID (AC + HS) PRN    ondansetron, 4 mg, Intravenous, Q6H PRN    oxyCODONE, 5 mg, Oral,  "Q4H PRN    oxyCODONE, 10 mg, Oral, Q4H PRN    simethicone, 1 tablet, Oral, TID PRN    sodium chloride 0.9%, 10 mL, Intravenous, PRN    Intake/Output - Last 3 Shifts         05/09 0700  05/10 0659 05/10 0700  05/11 0659 05/11 0700  05/12 0659    P.O. 660 720     Blood  350     Other       IV Piggyback       Total Intake(mL/kg) 660 (9.8) 1070 (15.8)     Urine (mL/kg/hr) 95 (0.1) 140 (0.1)     Drains 175 180     Other  3383     Stool       Total Output 270 3703     Net +390 -[phone]                     Review of Systems   Constitutional:  Negative for activity change, appetite change and fever.   Respiratory:  Negative for cough and shortness of breath.    Cardiovascular:  Negative for chest pain and leg swelling.   Gastrointestinal:  Positive for abdominal pain. Negative for abdominal distention, diarrhea, nausea and vomiting.   Genitourinary:  Positive for decreased urine volume. Negative for difficulty urinating.   Skin:  Negative for wound.   Allergic/Immunologic: Positive for immunocompromised state.   Neurological:  Negative for dizziness and weakness.      Objective:     Vital Signs (Most Recent):  Temp: 98.4 °F (36.9 °C) (05/11/25 0745)  Pulse: 68 (05/11/25 0745)  Resp: 18 (05/11/25 0745)  BP: (!) 143/97 (05/11/25 0745)  SpO2: 100 % (05/11/25 0745) Vital Signs (24h Range):  Temp:  [98 °F (36.7 °C)-98.7 °F (37.1 °C)] 98.4 °F (36.9 °C)  Pulse:  [] 68  Resp:  [18] 18  SpO2:  [93 %-100 %] 100 %  BP: (110-158)/() 143/97     Weight: 67.9 kg (149 lb 11.1 oz)  Height: 5' 2" (157.5 cm)  Body mass index is 27.38 kg/m².     Physical Exam  Vitals reviewed.   Constitutional:       Appearance: She is well-developed.   Eyes:      Pupils: Pupils are equal, round, and reactive to light.   Cardiovascular:      Rate and Rhythm: Normal rate and regular rhythm.      Heart sounds: No murmur heard.  Pulmonary:      Effort: Pulmonary effort is normal. No respiratory distress.      Breath sounds: Normal breath sounds. No " wheezing.   Abdominal:      General: Bowel sounds are normal. There is no distension.      Palpations: Abdomen is soft.      Tenderness: There is abdominal tenderness. There is no guarding.      Comments: Midline incision with dermabond  LILLIAN x 1- ss drainage   Musculoskeletal:         General: Normal range of motion.      Cervical back: Normal range of motion.   Skin:     General: Skin is warm and dry.   Neurological:      Mental Status: She is alert and oriented to person, place, and time.   Psychiatric:         Mood and Affect: Mood normal.         Behavior: Behavior normal.         Thought Content: Thought content normal.         Judgment: Judgment normal.          Laboratory:  CBC:   Recent Labs   Lab 05/09/25  0550 05/10/25  0523 05/11/25  0513   WBC 6.76 5.54 5.99   RBC 2.58* 2.32* 3.02*   HGB 8.0* 7.0* 8.9*   HCT 26.3* 23.2* 28.6*    238 235   * 100* 95   MCH 31.0 30.2 29.5   MCHC 30.4* 30.2* 31.1*     CMP:   Recent Labs   Lab 05/07/25  0430 05/07/25  1811 05/09/25  0550 05/10/25  0523 05/11/25  0513   GLU 93   < > 109 92 83   CALCIUM 7.9*   < > 8.4* 8.2* 8.5*   ALBUMIN 2.5*   < > 2.1* 2.1* 2.3*   PROT 5.7*  --   --   --   --       < > 137 138 138   K 3.8   < > 5.0 4.3 4.2   CO2 26   < > 24 23 23   CL 98   < > 102 101 103   BUN 45*   < > 32* 52* 27*   CREATININE 10.3*   < > 6.5* 7.2* 4.3*   ALKPHOS 189*  --   --   --   --    ALT 16  --   --   --   --    AST 16  --   --   --   --     < > = values in this interval not displayed.     Labs within the past 24 hours have been reviewed.    Diagnostic Results:  None

## 2025-05-11 NOTE — PROGRESS NOTES
[facility]  Kidney Transplant  Progress Note      Reason for Follow-up: Reassessment of Kidney Transplant - 5/7/2025  (#1) recipient and management of immunosuppression.    ORGAN: LEFT KIDNEY      Donor Type: Donation after Brain Death      Donor CMV Status: Positive  Donor HBcAB:Negative  Donor HCV Status:Negative  Donor HBV JIE:   Donor HCV JIE: Negative      Subjective:   History of Present Illness:  Ms. Gabriel is a 24 y.o. White female who has ESRD 2/2 FSGS. She has had 2 previous kidney transplants (~ 2015 at [facility], 12/2020 at [facility], [place]). 1st kidney lost to hyper acute rejection and 2nd kidney due to infection. She is on PD since 2021, tolerating well, denies hx peritonitis. PMH also includes: seizure and hx PRES in setting of HTN emergency, decreased bladder capacity seen by urology. Anuric. She presents as a primary candidate for a kidney transplant. Reports being in her usual state of health, denies recent illnesses or hospitalizations. Per urology, needs oxybutynin POD1 and repeat urodynamics outpatient once bladder enlarges again to assess for hostile bladder at larger volumes. Surgery scheduled for 1100 with Dr. Stern. Pre op labs and imaging pending, will be reviewed prior to transplant     Ms. Gabriel is a 24 y.o. year old female who is status post Kidney Transplant - 5/7/2025  (#1).  Her maintenance immunosuppression consists of:   Immunosuppressants (From admission, onward)      Start     Stop Route Frequency Ordered    05/10/25 1800  tacrolimus capsule 4 mg  (IP TXP KIDNEY POST-OP THYMO WITH PERIPHERAL PRECHECKED IBW)         -- Oral 2 times daily 05/10/25 1022    05/07/25 2200  mycophenolate capsule 1,000 mg  (IP TXP KIDNEY POST-OP THYMO WITH PERIPHERAL PRECHECKED IBW)         -- Oral 2 times daily 05/07/25 1807          Hospital Course:  Patient s/p DBD Ktx (#3) and PD catheter removal on 5/7/25. 5 day lyon (U-U.small bladder + 4 week stent) and LILLIAN x 1. Patient with  hyperkalemia POD 1 requiring HD. UOP dropping off, non responsive to IV lasix. Hold off on HD today. Will reassess tomorrow. Interventional nephrology consulted for perm cath placement for Monday 5/12. Hypotension in PACU, H/H stable. Midodrine TID for 24 hours with some improvement. D/c midodrine today and will monitor BP. TTE ordered to assess.     Interval history : POD 4 - patient had HD on 5/10 with 1 unit prbc  now Hgb 8.9 from from 7.3, post HD  had approx 2 hours of SVT - added back Metoprolol 100mg and now NSR 70's (of note patient was taking 150mg BID Met XL at home), Wound CDI, drain 180 serous, UO : 140cc,   Tolerating diet. Encouraged ambulation, lyon to be removed on Monday.       Past Medical, Surgical, Family, and Social History:   Unchanged from H&P.    Scheduled Meds:   bisacodyL  10 mg Oral QHS    docusate sodium  100 mg Oral TID    famotidine  20 mg Oral QHS    heparin (porcine)  5,000 Units Subcutaneous Q8H    metoprolol succinate  100 mg Oral Daily    multivitamin  1 tablet Oral Daily    mupirocin  1 g Nasal BID    mycophenolate  1,000 mg Oral BID    NIFEdipine  30 mg Oral Daily    oxybutynin  5 mg Oral TID    predniSONE  20 mg Oral Daily    sevelamer carbonate  2,400 mg Oral TID WM    [START ON 5/17/2025] sulfamethoxazole-trimethoprim 400-80mg  1 tablet Oral Daily AM    tacrolimus  4 mg Oral BID    [START ON 5/17/2025] valGANciclovir  450 mg Oral Daily AM     Continuous Infusions:   glucagon (human recombinant)  1 mg Intramuscular Continuous PRN         PRN Meds:  Current Facility-Administered Medications:     0.9%  NaCl infusion (for blood administration), , Intravenous, Q24H PRN    aluminum-magnesium hydroxide-simethicone, 30 mL, Oral, Q6H PRN    calcium carbonate, 500 mg, Oral, TID PRN    dextrose 50%, 12.5 g, Intravenous, PRN    diphenhydrAMINE, 50 mg, Oral, Once PRN    EPINEPHrine (PF), 1 mg, Subcutaneous, Once PRN    glucagon (human recombinant), 1 mg, Intramuscular, Continuous PRN     "glucose, 16 g, Oral, PRN    glucose, 16 g, Oral, PRN    glucose, 24 g, Oral, PRN    heparin (porcine), 2,000 Units, Intra-Catheter, Daily PRN    hydrALAZINE, 10 mg, Intravenous, Q8H PRN    hydrocortisone sodium succinate, 100 mg, Intravenous, Once PRN    insulin aspart U-100, 0-5 Units, Subcutaneous, QID (AC + HS) PRN    ondansetron, 4 mg, Intravenous, Q6H PRN    oxyCODONE, 5 mg, Oral, Q4H PRN    oxyCODONE, 10 mg, Oral, Q4H PRN    simethicone, 1 tablet, Oral, TID PRN    sodium chloride 0.9%, 10 mL, Intravenous, PRN    Intake/Output - Last 3 Shifts         05/09 0700  05/10 0659 05/10 0700 05/11 0659 05/11 0700  05/12 0659    P.O. 660 720     Blood  350     Other       IV Piggyback       Total Intake(mL/kg) 660 (9.8) 1070 (15.8)     Urine (mL/kg/hr) 95 (0.1) 140 (0.1)     Drains 175 180     Other  3383     Stool       Total Output 270 3703     Net +390 -2633                     Review of Systems   Constitutional:  Negative for activity change, appetite change and fever.   Respiratory:  Negative for cough and shortness of breath.    Cardiovascular:  Negative for chest pain and leg swelling.   Gastrointestinal:  Positive for abdominal pain. Negative for abdominal distention, diarrhea, nausea and vomiting.   Genitourinary:  Positive for decreased urine volume. Negative for difficulty urinating.   Skin:  Negative for wound.   Allergic/Immunologic: Positive for immunocompromised state.   Neurological:  Negative for dizziness and weakness.      Objective:     Vital Signs (Most Recent):  Temp: 98.4 °F (36.9 °C) (05/11/25 0745)  Pulse: 68 (05/11/25 0745)  Resp: 18 (05/11/25 0745)  BP: (!) 143/97 (05/11/25 0745)  SpO2: 100 % (05/11/25 0745) Vital Signs (24h Range):  Temp:  [98 °F (36.7 °C)-98.7 °F (37.1 °C)] 98.4 °F (36.9 °C)  Pulse:  [] 68  Resp:  [18] 18  SpO2:  [93 %-100 %] 100 %  BP: (110-158)/() 143/97     Weight: 67.9 kg (149 lb 11.1 oz)  Height: 5' 2" (157.5 cm)  Body mass index is 27.38 kg/m².   "   Physical Exam  Vitals reviewed.   Constitutional:       Appearance: She is well-developed.   Eyes:      Pupils: Pupils are equal, round, and reactive to light.   Cardiovascular:      Rate and Rhythm: Normal rate and regular rhythm.      Heart sounds: No murmur heard.  Pulmonary:      Effort: Pulmonary effort is normal. No respiratory distress.      Breath sounds: Normal breath sounds. No wheezing.   Abdominal:      General: Bowel sounds are normal. There is no distension.      Palpations: Abdomen is soft.      Tenderness: There is abdominal tenderness. There is no guarding.      Comments: Midline incision with dermabond  LILLIAN x 1- ss drainage   Musculoskeletal:         General: Normal range of motion.      Cervical back: Normal range of motion.   Skin:     General: Skin is warm and dry.   Neurological:      Mental Status: She is alert and oriented to person, place, and time.   Psychiatric:         Mood and Affect: Mood normal.         Behavior: Behavior normal.         Thought Content: Thought content normal.         Judgment: Judgment normal.          Laboratory:  CBC:   Recent Labs   Lab 05/09/25  0550 05/10/25  0523 05/11/25  0513   WBC 6.76 5.54 5.99   RBC 2.58* 2.32* 3.02*   HGB 8.0* 7.0* 8.9*   HCT 26.3* 23.2* 28.6*    238 235   * 100* 95   MCH 31.0 30.2 29.5   MCHC 30.4* 30.2* 31.1*     CMP:   Recent Labs   Lab 05/07/25  0430 05/07/25  1811 05/09/25  0550 05/10/25  0523 05/11/25  0513   GLU 93   < > 109 92 83   CALCIUM 7.9*   < > 8.4* 8.2* 8.5*   ALBUMIN 2.5*   < > 2.1* 2.1* 2.3*   PROT 5.7*  --   --   --   --       < > 137 138 138   K 3.8   < > 5.0 4.3 4.2   CO2 26   < > 24 23 23   CL 98   < > 102 101 103   BUN 45*   < > 32* 52* 27*   CREATININE 10.3*   < > 6.5* 7.2* 4.3*   ALKPHOS 189*  --   --   --   --    ALT 16  --   --   --   --    AST 16  --   --   --   --     < > = values in this interval not displayed.     Labs within the past 24 hours have been reviewed.    Diagnostic  "Results:  None  Assessment/Plan:     * FSGS (focal segmental glomerulosclerosis)  - daily urine Pr/Cr      Prophylactic immunotherapy  - Continue Cellcept and Prograf. Will monitor for signs of toxic side effects, check daily tacrolimus troughs, and change meds accordingly.       Long-term use of immunosuppressant medication  - see "prophylactic immuno"      At risk for opportunistic infections  - Valcyte for CMV prophylaxis  - Bactrim for PCP prophylaxis   To be started on POD 10      Status post -donor kidney transplantation  - s/p DBD Ktx (#3) and PD cath removal   - 5 day lyon (U-U/small bladder). 4 week stent. LILLIAN x 1- ss drainage  - HD POD 1 for hyperkalemia  - making minimal urine. Unresponsive to lasix.   - HD today. Also 1U PRBC  - also to start nifedipine  - Interventional Nephrology consulted for perm cath placement on Monday        Hyperglycemia  - endocrine consulted for BG management       Failed kidney transplant x2        Anemia in chronic kidney disease, on chronic dialysis  - h/h stable   - monitor with CBC       Benign hypertension with ESRD (end-stage renal disease)  - w history of PRESS 2/2 hypertensive emergency   - monitor closely, add anti-hypertensive agents as needed  *had hypotension post-op requiring midodrine. Received Midodrine for 24 hrs, dc'd .  Monitor closely for elevated BP  - Obtain TTE            Discharge Planning:  Not candidate for discharge at this time    Medical decision making for this encounter includes review of pertinent labs and diagnostic studies, assessment and planning, discussions with consulting providers, discussion with patient/family, and participation in multidisciplinary rounds. Time spent caring for patient: 30 minutes    JOVAN Dumont  Kidney Transplant  [facility] - Transplant Stepdown  "

## 2025-05-11 NOTE — PLAN OF CARE
Pt AAOx4. Afebrile. Diastolic BP >100, IV hydralazine given as ordered, BP WNL. PRN oxy given for pain. UO 40 mL for this shift. MONO Felton notified. Pt had HD today, no further concerns at this time. No BM overnight. No report of fall or injury. Bed in the lowest setting, call light within reach.

## 2025-05-11 NOTE — CARE UPDATE
Interventional Nephrology:      The patient is scheduled for cuffed tunneled HD catheter insertion tomorrow.    Please discontinue Heparin from the evening, send PT and PTT today and  keep her NPO after midnight.           ROGER MULLEN.Aric. MD. WARREN. DEEPA.  , [employer] [facility] [employer]  Nephrology Consultant. [facility].   [address] [place] [address]. [address]th [place] [address].    email: [email or web address]  Tel: Office: [phone]

## 2025-05-12 DIAGNOSIS — Z94.0 KIDNEY REPLACED BY TRANSPLANT: Primary | ICD-10-CM

## 2025-05-12 PROBLEM — T86.19 DELAYED GRAFT FUNCTION OF KIDNEY TRANSPLANT DUE TO ATN REQUIRING ACUTE DIALYSIS: Status: ACTIVE | Noted: 2025-05-12

## 2025-05-12 PROBLEM — Z99.2 DELAYED GRAFT FUNCTION OF KIDNEY TRANSPLANT DUE TO ATN REQUIRING ACUTE DIALYSIS: Status: ACTIVE | Noted: 2025-05-12

## 2025-05-12 LAB
ABSOLUTE EOSINOPHIL (OHS): 0.09 K/UL
ABSOLUTE MONOCYTE (OHS): 0.47 K/UL (ref 0.3–1)
ABSOLUTE NEUTROPHIL COUNT (OHS): 4.6 K/UL (ref 1.8–7.7)
ALBUMIN SERPL BCP-MCNC: 2.5 G/DL (ref 3.5–5.2)
ANION GAP (OHS): 13 MMOL/L (ref 8–16)
APTT PPP: 26.5 SECONDS (ref 21–32)
BACTERIA FLD AEROBE CULT: NO GROWTH
BASOPHILS # BLD AUTO: 0.03 K/UL
BASOPHILS NFR BLD AUTO: 0.5 %
BUN SERPL-MCNC: 46 MG/DL (ref 6–20)
CALCIUM SERPL-MCNC: 9.2 MG/DL (ref 8.7–10.5)
CHLORIDE SERPL-SCNC: 103 MMOL/L (ref 95–110)
CO2 SERPL-SCNC: 21 MMOL/L (ref 23–29)
CREAT SERPL-MCNC: 6.1 MG/DL (ref 0.5–1.4)
ERYTHROCYTE [DISTWIDTH] IN BLOOD BY AUTOMATED COUNT: 16.4 % (ref 11.5–14.5)
GFR SERPLBLD CREATININE-BSD FMLA CKD-EPI: 9 ML/MIN/1.73/M2
GLUCOSE SERPL-MCNC: 83 MG/DL (ref 70–110)
GRAM STN SPEC: NORMAL
GRAM STN SPEC: NORMAL
HCT VFR BLD AUTO: 34.3 % (ref 37–48.5)
HGB BLD-MCNC: 10.5 GM/DL (ref 12–16)
IMM GRANULOCYTES # BLD AUTO: 0.06 K/UL (ref 0–0.04)
IMM GRANULOCYTES NFR BLD AUTO: 1 % (ref 0–0.5)
INR PPP: 1 (ref 0.8–1.2)
LYMPHOCYTES # BLD AUTO: 0.55 K/UL (ref 1–4.8)
MAGNESIUM SERPL-MCNC: 2.7 MG/DL (ref 1.6–2.6)
MCH RBC QN AUTO: 29.5 PG (ref 27–31)
MCHC RBC AUTO-ENTMCNC: 30.6 G/DL (ref 32–36)
MCV RBC AUTO: 96 FL (ref 82–98)
NUCLEATED RBC (/100WBC) (OHS): 0 /100 WBC
PHOSPHATE SERPL-MCNC: 6.2 MG/DL (ref 2.7–4.5)
PLATELET # BLD AUTO: 308 K/UL (ref 150–450)
PMV BLD AUTO: 8.7 FL (ref 9.2–12.9)
POTASSIUM SERPL-SCNC: 4.9 MMOL/L (ref 3.5–5.1)
PROTHROMBIN TIME: 10.8 SECONDS (ref 9–12.5)
RBC # BLD AUTO: 3.56 M/UL (ref 4–5.4)
RELATIVE EOSINOPHIL (OHS): 1.6 %
RELATIVE LYMPHOCYTE (OHS): 9.5 % (ref 18–48)
RELATIVE MONOCYTE (OHS): 8.1 % (ref 4–15)
RELATIVE NEUTROPHIL (OHS): 79.3 % (ref 38–73)
SODIUM SERPL-SCNC: 137 MMOL/L (ref 136–145)
TACROLIMUS BLD-MCNC: 9.7 NG/ML (ref 5–15)
WBC # BLD AUTO: 5.8 K/UL (ref 3.9–12.7)

## 2025-05-12 PROCEDURE — 85730 THROMBOPLASTIN TIME PARTIAL: CPT | Performed by: TRANSPLANT SURGERY

## 2025-05-12 PROCEDURE — 25000003 PHARM REV CODE 250: Performed by: INTERNAL MEDICINE

## 2025-05-12 PROCEDURE — 25000003 PHARM REV CODE 250: Performed by: TRANSPLANT SURGERY

## 2025-05-12 PROCEDURE — 99233 SBSQ HOSP IP/OBS HIGH 50: CPT | Mod: 24,,, | Performed by: PHYSICIAN ASSISTANT

## 2025-05-12 PROCEDURE — 25000003 PHARM REV CODE 250: Performed by: STUDENT IN AN ORGANIZED HEALTH CARE EDUCATION/TRAINING PROGRAM

## 2025-05-12 PROCEDURE — 63600175 PHARM REV CODE 636 W HCPCS: Performed by: STUDENT IN AN ORGANIZED HEALTH CARE EDUCATION/TRAINING PROGRAM

## 2025-05-12 PROCEDURE — 25000003 PHARM REV CODE 250: Performed by: NURSE PRACTITIONER

## 2025-05-12 PROCEDURE — 99152 MOD SED SAME PHYS/QHP 5/>YRS: CPT | Performed by: INTERNAL MEDICINE

## 2025-05-12 PROCEDURE — 02HV33Z INSERTION OF INFUSION DEVICE INTO SUPERIOR VENA CAVA, PERCUTANEOUS APPROACH: ICD-10-PCS | Performed by: INTERNAL MEDICINE

## 2025-05-12 PROCEDURE — C1750 CATH, HEMODIALYSIS,LONG-TERM: HCPCS | Performed by: INTERNAL MEDICINE

## 2025-05-12 PROCEDURE — 0JH63XZ INSERTION OF TUNNELED VASCULAR ACCESS DEVICE INTO CHEST SUBCUTANEOUS TISSUE AND FASCIA, PERCUTANEOUS APPROACH: ICD-10-PCS | Performed by: INTERNAL MEDICINE

## 2025-05-12 PROCEDURE — 36415 COLL VENOUS BLD VENIPUNCTURE: CPT | Performed by: STUDENT IN AN ORGANIZED HEALTH CARE EDUCATION/TRAINING PROGRAM

## 2025-05-12 PROCEDURE — 27000207 HC ISOLATION

## 2025-05-12 PROCEDURE — 63600175 PHARM REV CODE 636 W HCPCS: Mod: UD | Performed by: INTERNAL MEDICINE

## 2025-05-12 PROCEDURE — 83735 ASSAY OF MAGNESIUM: CPT | Performed by: STUDENT IN AN ORGANIZED HEALTH CARE EDUCATION/TRAINING PROGRAM

## 2025-05-12 PROCEDURE — 84100 ASSAY OF PHOSPHORUS: CPT | Performed by: STUDENT IN AN ORGANIZED HEALTH CARE EDUCATION/TRAINING PROGRAM

## 2025-05-12 PROCEDURE — 85025 COMPLETE CBC W/AUTO DIFF WBC: CPT | Performed by: STUDENT IN AN ORGANIZED HEALTH CARE EDUCATION/TRAINING PROGRAM

## 2025-05-12 PROCEDURE — 80197 ASSAY OF TACROLIMUS: CPT | Performed by: STUDENT IN AN ORGANIZED HEALTH CARE EDUCATION/TRAINING PROGRAM

## 2025-05-12 PROCEDURE — 25000003 PHARM REV CODE 250: Performed by: PHYSICIAN ASSISTANT

## 2025-05-12 PROCEDURE — 85610 PROTHROMBIN TIME: CPT | Performed by: TRANSPLANT SURGERY

## 2025-05-12 PROCEDURE — C1769 GUIDE WIRE: HCPCS | Performed by: INTERNAL MEDICINE

## 2025-05-12 PROCEDURE — 20600001 HC STEP DOWN PRIVATE ROOM

## 2025-05-12 PROCEDURE — 99153 MOD SED SAME PHYS/QHP EA: CPT | Performed by: INTERNAL MEDICINE

## 2025-05-12 DEVICE — GLIDEPATH HEMODIALYSIS CATH, ALPHACURVE, DL, 14.5 FR. 19CM
Type: IMPLANTABLE DEVICE | Site: CHEST | Status: FUNCTIONAL
Brand: GLIDEPATH LONG-TERM HEMODIALYSIS CATHETER

## 2025-05-12 RX ORDER — CALCIUM CARBONATE 500(1250)
2 TABLET,CHEWABLE ORAL
Status: ON HOLD | COMMUNITY
End: 2025-05-13 | Stop reason: HOSPADM

## 2025-05-12 RX ORDER — TORSEMIDE 20 MG/1
100 TABLET ORAL DAILY
Status: DISCONTINUED | OUTPATIENT
Start: 2025-05-12 | End: 2025-05-13 | Stop reason: HOSPADM

## 2025-05-12 RX ORDER — HEPARIN SODIUM 1000 [USP'U]/ML
INJECTION, SOLUTION INTRAVENOUS; SUBCUTANEOUS
Status: DISCONTINUED | OUTPATIENT
Start: 2025-05-12 | End: 2025-05-12 | Stop reason: HOSPADM

## 2025-05-12 RX ORDER — HEPARIN SODIUM 1000 [USP'U]/ML
1000 INJECTION, SOLUTION INTRAVENOUS; SUBCUTANEOUS
Status: CANCELLED | OUTPATIENT
Start: 2025-05-13

## 2025-05-12 RX ORDER — SODIUM CHLORIDE 9 MG/ML
INJECTION, SOLUTION INTRAVENOUS ONCE
Status: CANCELLED | OUTPATIENT
Start: 2025-05-13 | End: 2025-05-13

## 2025-05-12 RX ORDER — ACETAMINOPHEN 10 MG/ML
INJECTION, SOLUTION INTRAVENOUS
Status: DISCONTINUED | OUTPATIENT
Start: 2025-05-12 | End: 2025-05-13 | Stop reason: HOSPADM

## 2025-05-12 RX ORDER — TACROLIMUS 1 MG/1
4 CAPSULE ORAL EVERY 12 HOURS
Qty: 240 CAPSULE | Refills: 11 | Status: SHIPPED | OUTPATIENT
Start: 2025-05-12

## 2025-05-12 RX ORDER — FENTANYL CITRATE 50 UG/ML
INJECTION, SOLUTION INTRAMUSCULAR; INTRAVENOUS
Status: DISCONTINUED | OUTPATIENT
Start: 2025-05-12 | End: 2025-05-12 | Stop reason: HOSPADM

## 2025-05-12 RX ORDER — MIDAZOLAM HYDROCHLORIDE 1 MG/ML
INJECTION INTRAMUSCULAR; INTRAVENOUS
Status: DISCONTINUED | OUTPATIENT
Start: 2025-05-12 | End: 2025-05-12 | Stop reason: HOSPADM

## 2025-05-12 RX ORDER — METOPROLOL SUCCINATE 100 MG/1
100 TABLET, EXTENDED RELEASE ORAL DAILY
Qty: 90 TABLET | Refills: 3 | Status: SHIPPED | OUTPATIENT
Start: 2025-05-12 | End: 2026-05-12

## 2025-05-12 RX ORDER — OXYCODONE HYDROCHLORIDE 10 MG/1
5-10 TABLET ORAL EVERY 4 HOURS PRN
Qty: 40 TABLET | Refills: 0 | Status: SHIPPED | OUTPATIENT
Start: 2025-05-12

## 2025-05-12 RX ADMIN — PREDNISONE 20 MG: 20 TABLET ORAL at 01:05

## 2025-05-12 RX ADMIN — NIFEDIPINE 30 MG: 30 TABLET, FILM COATED, EXTENDED RELEASE ORAL at 01:05

## 2025-05-12 RX ADMIN — SEVELAMER CARBONATE 2400 MG: 800 TABLET, FILM COATED ORAL at 06:05

## 2025-05-12 RX ADMIN — METOPROLOL SUCCINATE 100 MG: 50 TABLET, EXTENDED RELEASE ORAL at 01:05

## 2025-05-12 RX ADMIN — TACROLIMUS 4 MG: 1 CAPSULE ORAL at 06:05

## 2025-05-12 RX ADMIN — MYCOPHENOLATE MOFETIL 1000 MG: 250 CAPSULE ORAL at 10:05

## 2025-05-12 RX ADMIN — THERA TABS 1 TABLET: TAB at 01:05

## 2025-05-12 RX ADMIN — OXYCODONE 5 MG: 5 TABLET ORAL at 06:05

## 2025-05-12 RX ADMIN — OXYBUTYNIN CHLORIDE 5 MG: 5 TABLET ORAL at 01:05

## 2025-05-12 RX ADMIN — DOCUSATE SODIUM 100 MG: 100 CAPSULE, LIQUID FILLED ORAL at 09:05

## 2025-05-12 RX ADMIN — OXYCODONE 5 MG: 5 TABLET ORAL at 01:05

## 2025-05-12 RX ADMIN — OXYBUTYNIN CHLORIDE 5 MG: 5 TABLET ORAL at 06:05

## 2025-05-12 RX ADMIN — TORSEMIDE 100 MG: 20 TABLET ORAL at 05:05

## 2025-05-12 RX ADMIN — OXYBUTYNIN CHLORIDE 5 MG: 5 TABLET ORAL at 09:05

## 2025-05-12 RX ADMIN — DOCUSATE SODIUM 100 MG: 100 CAPSULE, LIQUID FILLED ORAL at 06:05

## 2025-05-12 RX ADMIN — DOCUSATE SODIUM 100 MG: 100 CAPSULE, LIQUID FILLED ORAL at 12:05

## 2025-05-12 RX ADMIN — BISACODYL 10 MG: 5 TABLET, COATED ORAL at 09:05

## 2025-05-12 RX ADMIN — MYCOPHENOLATE MOFETIL 1000 MG: 250 CAPSULE ORAL at 01:05

## 2025-05-12 RX ADMIN — FAMOTIDINE 20 MG: 20 TABLET, FILM COATED ORAL at 09:05

## 2025-05-12 RX ADMIN — MUPIROCIN 1 G: 20 OINTMENT TOPICAL at 01:05

## 2025-05-12 RX ADMIN — SEVELAMER CARBONATE 2400 MG: 800 TABLET, FILM COATED ORAL at 01:05

## 2025-05-12 NOTE — PROGRESS NOTES
This SW tried to meet with the patient at bedside but she was off the unit and no family members were at bedside.     This SW contacted [facility] to follow -up regarding this patient's dialysis set-up. This SW was told that Elena was unavailable at this time. This SW left her name and contact information and awaiting a call back.     This SW provided the patient's care team a update via secure chat regarding requesting a call back from Judit.      12:54 PM  This SW spoke with Elena with [facility] to follow-up regarding this patient's pending referral. Elena informed this SW that her MD did not accept this patient. The patient was declined by [facility]. Per Elena, the patient can stay at her home unit or go to [facility] for treatment.     This SW notified the patient's care team about the patient's declined at [facility] and the options that were provided by that unit.     2:00 PM  This SW contacted the patient's home dialysis unit to follow-up regarding the patient's dialysis. This SW spoke with a representative from the patient's home unit. The representative reported that the unit is three hours a away from [place], [place]. The representative informed this SW that she was going to let her speak directly with her clinic manager. This SW spoke with the clinic manager Judit to discuss this patient's dialysis.  Judit informed this SW that she was going escalate this patient's denial at [facility] to her director of operation.  Judit obtained this SW name and contact information. This SW notified the patient's care team about the above information. The patient's reported that three hours travel for dialysis is too long.  Per Dr. Dos Santos,  Patient wants to leave the hospital today and would prefer [facility] now. Delphine with [facility]  orders are in, pt is ready to go. This SW informed the patient's care team the new process for lodging which is it must be approved  by Keila or Marlys skinner.     3:24 PM  This SW met with the patient at bedside and spoke with her mother by phone. The patient's mother reported that the patient will have more people to assist in [place]. The patient's brother and uncle are going to be caregivers but both work. The patient's brother is a supervisor and able to take off work. The patient's uncle is off on Mondays and weekends. The patient's mother reported that the patient's aunt lives in [place] and does not work but she does not like to drive in [place]. The patient's mother is reaching to her family members to ensure that this patient has a caregiver here in [place] around the clock.     This SW notified the patient's care team via secure chat about the above information.     This SW received a phone call from Elena with the patient's home unit stating that [facility] will be able to accept this patient. Judit informed this SW that [facility] still is declining this patient but [facility] in only thirty minutes away from this patient's brother home in [place]  Judit informed this SW that the dialysis unit has a MWF 11;00 am.  Judit provided this SW with the contact manger name Grace [phone].    This SW notified the patient's care team via secure chat regarding the approval for [facility].     This SW will follow-up with the patient to discuss her preference regarding dialysis unit.     4:04 PM  This SW met with the patient and her brother at bedside to follow-up regarding her preference of dialysis units. The patient reported that her preference is [facility]. This SW provided the patient's care team with a update regarding the patient's preference for [facility].  This SW tried contacting [facility] multiple times but no one did not answer the phone.       4:43 PM  This SW tried contacting [facility] [place] multiple times but no one answered the phone. This SW left a message requesting a call back.     Neftali  Berenice LCSW, Kidney Transplant   [facility]  [address] [place] [address]

## 2025-05-12 NOTE — PLAN OF CARE
Pt AAOx4. Afebrile. VSS. PRN oxy given for pain. No report of injury or fall. Jessie d/c this am. Pt NPO since midnight for procedure. Bed in the lowest setting, call light within reach.

## 2025-05-12 NOTE — NURSING TRANSFER
Nursing Transfer Note      5/12/2025   11:10 AM    Nurse giving handoff: Camilla MONTANA  Nurse receiving handoff: Tracey MONTANA    Reason patient is being transferred: continue of care    Transfer From: cath hold 10    Transfer via stretcher    Transfer with cardiac monitoring    Transported by transport    Transfer Vital Signs:  See flowsheets    Telemetry: Box Number [id number], Rate 72, Rhythm SR, and Telemetry  Paolo  Order for Tele Monitor? Yes    Any special needs or follow-up needed:   -frequent checks due: 1300, 1400, and 1500  -bedrest until 1530  -post procedure orders per MD    Patient belongings transferred with patient: No    Chart send with patient: Yes

## 2025-05-12 NOTE — CARE UPDATE
HEMODIALYSIS CATHETER CARE    HEMODIALYSIS CATHETERS ARE ONLY FOR DIALYSIS ACCORDING TO [facility] AND [facility] POLICIES.     Patients' instruction list      Hemodialysis catheter is one of the major vascular access to provide hemodialysis. Infection is one of its common complication. To maintain a safe and long-term use of your catheter without the requirement of replacement and exchange please follow the care instruction for your catheter:    Avoid getting water on the catheter. It is highly recommended to keep the catheter dry. During bathing use only sponging with towel around the catheter area. Lower part of the body can be washed but avoid splashing water over the catheter.     Avoid scratching or touching the skin close to the catheter.     It is recommended to change the dressing during dialysis sessions only. If you find your catheter dressing is wet or not clean, please call your dialysis unit to arrange exchange as soon as possible.     If you notice pain, redness or discharge from the exit site of the catheter please call your dialysis unit or your Nephrologist immediately to arrange examining the catheter and excluding any early signs of infection.     Your dialysis catheter should only be used for dialysis. Only your dialysis nurse can use the catheter. Do not accept any other health care personnel to use your hemodialysis catheter for any reason. This catheter is not intended to be used for medications, IV fluid or taking blood for labs.     6.   The sutures used for the procedure are absorbably sutures and they will dissolve by themselves in 3 weeks.     The more strict you are in following the instructions the less will be the risk of infection and complications with your dialysis catheter.    We would like to wish you a great health and we will be glad to answer any question you have.      please communicate with [facility] or use [facility] to send us your questions.       JARRETT GAGE,  Kellee ARIZA. SUSANA AERNAS.  , [employer]  Nephrology Consultant. [facility].   [facility]. 5th [place] [address].    email: [email or web address]  Tel: Office: [phone]

## 2025-05-12 NOTE — NURSING
Received report from Jaki MONTANA. Patient s/p tunneled cath placement, AAOx4.   VSS, no c/o pain or discomfort at this time, resp even and unlabored. Pressure dressing to R chest wall is CDI. No active bleeding. No hematoma noted.   Post procedure protocol reviewed with patient. Understanding verbalized. Nurse call bell within reach.     Tele maintained.

## 2025-05-12 NOTE — OP NOTE
Date of Procedure: 5/12/2025     Procedure: Procedure(s) (LRB):  Insertion, Catheter, Central Venous, Hemodialysis (Right)       Anna Gabriel  2000  [id number]    Pre-op Diagnosis: ESRD (end stage renal disease) [N18.6]  FSGS (focal segmental glomerulosclerosis) [N05.1]   Post-op Diagnosis: ESRD (end stage renal disease) [N18.6]  FSGS (focal segmental glomerulosclerosis) [N05.1]   Post renal transplant 5/7/2025      Removal of LT IJ Trialysis.   The skin was sterilized with Betadine.   Sutures removed from the side of the catheter.    The catheter was removed.   Pressure held for 3 min.   There was no blood coming from the exit   site         Rt. IJ cuffed tunneled catheter with local anesthesia with sedation under areal time ultrasound and fluoroscopy.     The patient was placed supine.    The Rt. IJ was localized with US.    The skin was sterilized with Chlorhexidine.    2% Xylocaine with Epinephrine was given to anesthetize the skin and subcutaneous tissues.    The Rt. IJ was punctured under real time ultrasound.  A guide wire was inserted and left.     The tunneled tract was anesthetized with 2% xylocaine with Epinephrine.    The catheter was tunneled under the skin.   The sheath was introduced in the SVC under fluoroscopy after  multiple sequential dilatations performed over the guide wire.   The catheter is inserted inside the sheath after removing the guidewire and the dilater.   The catheter position is confirmed with the fluoroscopy with the tip in the Rt. Atrium. The curve is checked and no angulation in the tract was noticed.    Flushing and blood suction were checked from both catheter ports.    1000IU/ml Heparin was inserted according to the catheter dead space.   The wound was sutured.    No bleeding was noticed after observation.   Biopatch was placed over the wound and covered with Tegaderm.    Pressure dressing with rolled up 4 X4 gauze placed over the tunnel.    The procedure was smooth with no  complications.        Complications: No  Condition: Good  FOLLOWUP: In patient      The procedure is done under real time fluoroscopy.     You can use the catheter for dialysis after the observation period.    ROGER MULLEN.Aric. MD. WARREN. DEEPA.  , [employer] / [employer] ([place]  Nephrology Consultant. [facility].   [address] [place] [address]. 5th [place] [address].    email: [email or web address].  Tel: Office: [phone]

## 2025-05-12 NOTE — INTERVAL H&P NOTE
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    Procedure risks, benefits and alternative options discussed and understood by patient/family.          Active Hospital Problems    Diagnosis  POA    *FSGS (focal segmental glomerulosclerosis) [N05.1]  Yes    Status post -donor kidney transplantation [Z94.0]  Not Applicable    At risk for opportunistic infections [Z91.89]  No    Long-term use of immunosuppressant medication [Z79.60]  Not Applicable    Prophylactic immunotherapy [Z29.89]  Not Applicable    Hyperglycemia [R73.9]  No    Adrenal cortical steroids causing adverse effect in therapeutic use [T38.0X5A]  No    Failed kidney transplant x2 [T86.12]  Yes    Anemia in chronic kidney disease, on chronic dialysis [N18.6, D63.1, Z99.2]  Not Applicable    Benign hypertension with ESRD (end-stage renal disease) [I12.0, N18.6]  Yes      Resolved Hospital Problems   No resolved problems to display.

## 2025-05-12 NOTE — NURSING
Returned from cath lab via stretcher with remote tele in use. Dressing to R upper chest dry and intact. Pt very anxious. Intense emotional support given. ADORE Hooks at  and provided emotional support as well. LILLIAN drain removed. Steri strips intact. No drainage noted.

## 2025-05-12 NOTE — DISCHARGE INSTRUCTIONS
HEMODIALYSIS CATHETER CARE     HEMODIALYSIS CATHETERS ARE ONLY FOR DIALYSIS ACCORDING TO [facility] AND [facility] POLICIES.      Patients' instruction list        Hemodialysis catheter is one of the major vascular access to provide hemodialysis. Infection is one of its common complication. To maintain a safe and long-term use of your catheter without the requirement of replacement and exchange please follow the care instruction for your catheter:     Avoid getting water on the catheter. It is highly recommended to keep the catheter dry. During bathing use only sponging with towel around the catheter area. Lower part of the body can be washed but avoid splashing water over the catheter.      Avoid scratching or touching the skin close to the catheter.      It is recommended to change the dressing during dialysis sessions only. If you find your catheter dressing is wet or not clean, please call your dialysis unit to arrange exchange as soon as possible.      If you notice pain, redness or discharge from the exit site of the catheter please call your dialysis unit or your Nephrologist immediately to arrange examining the catheter and excluding any early signs of infection.      Your dialysis catheter should only be used for dialysis. Only your dialysis nurse can use the catheter. Do not accept any other health care personnel to use your hemodialysis catheter for any reason. This catheter is not intended to be used for medications, IV fluid or taking blood for labs.      6.   The sutures used for the procedure are absorbably sutures and they will dissolve by themselves in 3 weeks.      The more strict you are in following the instructions the less will be the risk of infection and complications with your dialysis catheter.     We would like to wish you a great health and we will be glad to answer any question you have.       please communicate with [facility] or use [facility] to send us your questions.

## 2025-05-12 NOTE — CARE UPDATE
HEMODIALYSIS CATHETER CARE    HEMODIALYSIS CATHETERS ARE ONLY FOR DIALYSIS ACCORDING TO [facility] AND [facility] POLICIES.     Patients' instruction list      Hemodialysis catheter is one of the major vascular access to provide hemodialysis. Infection is one of its common complication. To maintain a safe and long-term use of your catheter without the requirement of replacement and exchange please follow the care instruction for your catheter:    Avoid getting water on the catheter. It is highly recommended to keep the catheter dry. During bathing use only sponging with towel around the catheter area. Lower part of the body can be washed but avoid splashing water over the catheter.     Avoid scratching or touching the skin close to the catheter.     It is recommended to change the dressing during dialysis sessions only. If you find your catheter dressing is wet or not clean, please call your dialysis unit to arrange exchange as soon as possible.     If you notice pain, redness or discharge from the exit site of the catheter please call your dialysis unit or your Nephrologist immediately to arrange examining the catheter and excluding any early signs of infection.     Your dialysis catheter should only be used for dialysis. Only your dialysis nurse can use the catheter. Do not accept any other health care personnel to use your hemodialysis catheter for any reason. This catheter is not intended to be used for medications, IV fluid or taking blood for labs.     6.   The sutures used for the procedure are absorbably sutures and they will dissolve by themselves in 3 weeks.     The more strict you are in following the instructions the less will be the risk of infection and complications with your dialysis catheter.    We would like to wish you a great health and we will be glad to answer any question you have.      please communicate with [facility] or use [facility] to send us your questions.       JARRETT GAGE,  Kellee ARIZA. SUSANA ARENAS.  , [employer] / [employer]  Nephrology Consultant. [facility].   [facility]. 5th [place] [address].    email: [email or web address].  Tel: Office: [phone]

## 2025-05-12 NOTE — PROGRESS NOTES
Transplant Teaching Book given to patient, Anna Gabriel, on 05/12/2025.  During the course of the hospital stay the patient received information regarding kidney transplant. Teaching and instruction were completed.  Areas that were discussed included: how to contact the Transplant Team, the importance of measuring intake of fluids and urine output, and monitoring vital signs such as blood pressure, temperature, and daily weights.  Parameters for which to report abnormal findings were given.  Appointment were provided along with the rational for the importance of lab work and clinic visits.  A written medication list was provided.  The importance of immunosuppressive medications, their common side effects, and treatment to prevent or minimize side effects has been reviewed.  Signs and symptoms of rejection and infection along with various treatments were reviewed.  The need to avoid infection was discussed.  Wound care and special consideration regarding activities of daily living were explained.  Written and verbal teaching of the above information was given.     Discussed with the patient and caregiver the importance of maintaining COVID-19 precautions; wearing a mask, good handwashing, and social distancing.  Also, to report any signs or symptoms (fever, difficulty breathing, loss of taste/smell, etc.), suspected exposure, or COVID testing, immediately to the transplant program.

## 2025-05-12 NOTE — PROGRESS NOTES
EDUCATION NOTE:    Met with Anna Gabirel and her caregivers to provide teaching re: immunosuppressant medications.  Reviewed medication section of the Kidney Transplant Education book that was provided.  Emphasized the importance of compliance, role of the blue medication card, concerns for drug interactions, and process of obtaining refills.  Counseled regarding Prograf, Cellcept , prednisone, including directions for use, monitoring, how to handle missed doses, and side effects.  She verbalized understanding and had the opportunity to ask questions.

## 2025-05-12 NOTE — PLAN OF CARE
Cr=6.1 inc from 4.3. Na+=137. K+=4.9. Mg+2=2.7. INR=1.0. H&H=10.5 & 34.3. Perm cath placed this am. Dressing remains dry and intact. Tolerated lunch well. Voided X2 small amount urine. Last BM yesterday. Old LILLIAN site remains with steri-strips dry and intact. Afebrile. Reinforced to wear non-skid socks/slippers when ambulating to prevent falling. Brother at . Coordinator teaching in progress. C/O pain to perm cath site. Decreased with Oxycodone 5mg. Discharge planning in progress for HD unit after discharge. Pt did not pull self-meds today due to line placement. Plan for possible HD tomorrow.

## 2025-05-12 NOTE — ASSESSMENT & PLAN NOTE
- s/p DBD Ktx (#3) and PD cath removal 5/7  - 5 day lyon (U-U/small bladder). 4 week stent. LILLIAN x 1- ss drainage  - HD POD 1 for hyperkalemia  - making minimal urine. Unresponsive to lasix.   - Last HD 5/10 (+1u prbc)  - Interventional Nephrology consulted for perm cath placement today.  - lyon removed this am without issue

## 2025-05-12 NOTE — ASSESSMENT & PLAN NOTE
- w history of PRESS 2/2 hypertensive emergency   - monitor closely, add anti-hypertensive agents as needed  *had hypotension post-op requiring midodrine. Received Midodrine for 24 hrs, dc'd 5/9.  Monitor closely for elevated BP  - TTE with EF 65%  - BP stable

## 2025-05-12 NOTE — DISCHARGE SUMMARY
[facility] Transplant Stepdown  Kidney Transplant  Discharge Summary    Patient Name: Anna Gabriel  MRN: [id number]  Admission Date: 5/7/2025  Hospital Length of Stay: 5 days  Discharge Date and Time: 05/13/2025 8:59 AM  Attending Physician: Margy Domínguez MD   Discharging Provider: Lorenza Grider NP-C  Primary Care Provider: Alejandrina, Primary Doctor    HPI:   Ms. Gabriel is a 24 y.o. White female who has ESRD 2/2 FSGS. She has had 2 previous kidney transplants (~ 2015 at [facility], 12/2020 at [place], [place]). 1st kidney lost to hyper acute rejection and 2nd kidney due to infection. She is on PD since 2021, tolerating well, denies hx peritonitis. PMH also includes: seizure and hx PRES in setting of HTN emergency, decreased bladder capacity seen by urology. Anuric. She presents as a primary candidate for a kidney transplant. Reports being in her usual state of health, denies recent illnesses or hospitalizations. Per urology, needs oxybutynin POD1 and repeat urodynamics outpatient once bladder enlarges again to assess for hostile bladder at larger volumes. Surgery scheduled for 1100 with Dr. Stern. Pre op labs and imaging pending, will be reviewed prior to transplant   Procedure(s) (LRB):  Insertion, Catheter, Central Venous, Hemodialysis (Right)     Hospital Course:    Patient s/p DBD Ktx (#3) and PD catheter removal on 5/7/25. 5 day lyon (U-U.small bladder + 4 week stent) and LILLIAN x 1. Drains and lyon removed prior to discharge without issue. Post-op course notable for:  DGF- expected 2/2 CIT. Required HD POD 1 for hyperkalemia. Kidney function not yet clearing. Making minimal urine, not responsive to IV Lasix. Interventional Nephrology consulted for perm cath placement 5/12. Outpatient HD set up. She will dialyze MWF in [place]. Last HD 5/13  Will cont Torsemide.   Hypotension in PACU, H/H stable. Midodrine TID for 24 hours with improvement.   SVT- improved with restarting home Metoprolol (originally held for hypotension  post op).     Pt is stable and ready for discharge. Encouraged to continue fluid intake and strict I/O's. She has no complaints. Surgical pain well managed. Incision w/o drainage. Tunneled cath to Right chest wall CDI w Bio patch over insertion covered w tegaderm.  She has met with PharmD and received education. Pt verbalized understanding of discharge instructions and importance of follow-up.  Plan to start ASA 25.    Length of Stay was longer than expected due to: Discharged as expected    Goals of Care Treatment Preferences:  Code Status: Full Code      Final Active Diagnoses:    Diagnosis Date Noted POA    PRINCIPAL PROBLEM:  FSGS (focal segmental glomerulosclerosis) [N05.1] 2017 Yes    Delayed graft function of kidney transplant due to ATN requiring acute dialysis [T86.19, Z99.2] 2025 Not Applicable    Status post -donor kidney transplantation [Z94.0] 2025 Not Applicable    At risk for opportunistic infections [Z91.89] 2025 No    Long-term use of immunosuppressant medication [Z79.60] 2025 Not Applicable    Prophylactic immunotherapy [Z29.89] 2025 Not Applicable    Hyperglycemia [R73.9] 2025 No    Adrenal cortical steroids causing adverse effect in therapeutic use [T38.0X5A] 2025 No    Failed kidney transplant x2 [T86.12] 2024 Yes    Anemia in chronic kidney disease, on chronic dialysis [N18.6, D63.1, Z99.2] 2017 Not Applicable    Benign hypertension with ESRD (end-stage renal disease) [I12.0, N18.6] 2017 Yes      Problems Resolved During this Admission:     Treatments: see hospital course    Consults (From admission, onward)          Status Ordering Provider     IP consult to Interventional Nephrology  Once        Provider:  (Not yet assigned)    Completed YESSY DUNBAR     Inpatient consult to Interventional Radiology  Once        Provider:  (Not yet assigned)    Completed YESSY DUNBAR     Inpatient consult to  "Registered Dietitian/Nutritionist  Once        Provider:  (Not yet assigned)    Completed ANTHONY QUINTANILLA     Inpatient consult to Endocrinology  Once        Provider:  (Not yet assigned)    Completed JUSTIN MCCALL     Inpatient consult to Transplant Nephrology (KTM)  Once        Provider:  (Not yet assigned)    Acknowledged JUSTIN MCCALL            Pending Diagnostic Studies:       Procedure Component Value Units Date/Time    Protein / creatinine ratio, urine [[id number]]     Order Status: Sent Lab Status: No result     Specimen: Urine, Clean Catch           Significant Diagnostic Studies: Labs: CMP   Recent Labs   Lab 05/12/25  0621 05/13/25  0635    138   K 4.9 5.2*    103   CO2 21* 21*   GLU 83 77   BUN 46* 61*   CREATININE 6.1* 7.3*   CALCIUM 9.2 9.3   ALBUMIN 2.5* 2.5*   ANIONGAP 13 14     CBC   Recent Labs   Lab 05/12/25  0621 05/13/25  0635   WBC 5.80 4.90   HGB 10.5* 9.7*   HCT 34.3* 31.2*    296     INR   Lab Results   Component Value Date    INR 1.0 05/12/2025    INR 1.0 05/11/2025    INR 1.0 05/07/2025    PROTIME 10.8 05/12/2025    PROTIME 11.4 05/11/2025    PROTIME 10.8 05/07/2025     All labs within the past 24 hours have been reviewed    Microbiology: Blood Culture   Urine Culture  No results found for: "LABURIN"    Radiology: X-Ray: CXR: X-Ray Chest 1 View (CXR): No results found for this visit on 05/07/25.    Discharged Condition: fair    Disposition: to home accompanied by  family    Follow Up:   Follow-up Information       Roni Luna MD Follow up.    Specialty: Nephrology  Why: As needed  Contact information:  [facility] [address] [phone]                           Patient Instructions:   No discharge procedures on file.    Medications:  Reconciled Home Medications:      Medication List        PAUSE taking these medications      aspirin 81 MG Chew  Wait to take this until: May 27, 2025  Take 81 mg by mouth once daily.        "     START taking these medications      famotidine 20 MG tablet  Commonly known as: PEPCID  Take 1 tablet (20 mg total) by mouth every evening.     multivitamin Tab  Take 1 tablet by mouth once daily.     mycophenolate 250 mg Cap  Commonly known as: CELLCEPT  Take 4 capsules (1,000 mg total) by mouth 2 (two) times daily.     NIFEdipine 30 MG (OSM) 24 hr tablet  Commonly known as: PROCARDIA-XL  Take 1 tablet (30 mg total) by mouth once daily.     oxybutynin 5 MG Tab  Commonly known as: DITROPAN  Take 1 tablet (5 mg total) by mouth 3 (three) times daily.     oxyCODONE 10 mg Tab immediate release tablet  Commonly known as: ROXICODONE  Take 1/2-1 tablet (5-10 mg total) by mouth every 4 (four) hours as needed for Pain.     predniSONE 5 MG tablet  Commonly known as: DELTASONE  Take by mouth daily: 5/9 to 5/15 20 mg, 5/16 to 5/22 15 mg, 5/23 to 5/29 10 mg, 5/30/25 start 5 mg oral daily, DO NOT DISCONTINUE     sulfamethoxazole-trimethoprim 400-80mg 400-80 mg per tablet  Commonly known as: BACTRIM,SEPTRA  Take 1 tablet by mouth every Mon, Wed, Fri. Stop 11/4/2025     tacrolimus 1 MG Cap  Commonly known as: PROGRAF  Take 4 capsules (4 mg total) by mouth every 12 (twelve) hours.     torsemide 100 MG Tab  Commonly known as: DEMADEX  Take 1 tablet (100 mg total) by mouth once daily. for 7 days     valGANciclovir 450 mg Tab  Commonly known as: VALCYTE  Take 1 tablet (450 mg total) by mouth every Mon, Wed, Fri. Stop 11/4/2025            CHANGE how you take these medications      calcium carbonate 200 mg calcium (500 mg) chewable tablet  Commonly known as: TUMS  Take 2,000 mg by mouth 3 (three) times daily with meals.  What changed: Another medication with the same name was removed. Continue taking this medication, and follow the directions you see here.     metoprolol succinate 100 MG 24 hr tablet  Commonly known as: TOPROL-XL  Take 1 tablet (100 mg total) by mouth once daily.  What changed:   how much to take  when to take this             CONTINUE taking these medications      heparin (porcine) 1,000 unit/mL injection  5,000 Units by Intra-Catheter route as needed. use as directed            STOP taking these medications      calcium carbonate 500 mg calcium (1,250 mg) chewable tablet  Commonly known as: OS-JULIAN     cinacalcet 30 MG Tab  Commonly known as: SENSIPAR     ergocalciferol 50,000 unit Cap  Commonly known as: ERGOCALCIFEROL     folic acid 1 MG tablet  Commonly known as: FOLVITE     hydrALAZINE 50 MG tablet  Commonly known as: APRESOLINE     losartan 100 MG tablet  Commonly known as: COZAAR     MIRCERA 100 mcg/0.3 mL Syrg  Generic drug: epoetin beta, methoxy peg     pantoprazole 40 MG tablet  Commonly known as: PROTONIX     GOPAL-TORIE RX 1- mg-mg-mcg Tab  Generic drug: vit b cmplx 3-fa-vit c-biotin 1- mg-mg-mcg              Time spent caring for patient (Greater than 1/2 spent in direct face-to-face contact): > 30 minutes    Lorenza Grider NP-C  Kidney Transplant  [facility] - Transplant Stepdown

## 2025-05-12 NOTE — SUBJECTIVE & OBJECTIVE
Subjective:   History of Present Illness:  Ms. Gabriel is a 24 y.o. White female who has ESRD 2/2 FSGS. She has had 2 previous kidney transplants (~ 2015 at [facility], 12/2020 at [facility], [place]). 1st kidney lost to hyper acute rejection and 2nd kidney due to infection. She is on PD since 2021, tolerating well, denies hx peritonitis. PMH also includes: seizure and hx PRES in setting of HTN emergency, decreased bladder capacity seen by urology. Anuric. She presents as a primary candidate for a kidney transplant. Reports being in her usual state of health, denies recent illnesses or hospitalizations. Per urology, needs oxybutynin POD1 and repeat urodynamics outpatient once bladder enlarges again to assess for hostile bladder at larger volumes. Surgery scheduled for 1100 with Dr. Stern. Pre op labs and imaging pending, will be reviewed prior to transplant     Ms. Gabriel is a 24 y.o. year old female who is status post Kidney Transplant - 5/7/2025  (#1).  Her maintenance immunosuppression consists of:   Immunosuppressants (From admission, onward)      Start     Stop Route Frequency Ordered    05/10/25 1800  tacrolimus capsule 4 mg  (IP TXP KIDNEY POST-OP THYMO WITH PERIPHERAL PRECHECKED IBW)         -- Oral 2 times daily 05/10/25 1022    05/07/25 2200  mycophenolate capsule 1,000 mg  (IP TXP KIDNEY POST-OP THYMO WITH PERIPHERAL PRECHECKED IBW)         -- Oral 2 times daily 05/07/25 1807            Hospital Course:  Patient s/p DBD Ktx (#3) and PD catheter removal on 5/7/25. 5 day lyon (U-U.small bladder + 4 week stent) and LILLIAN x 1. Post-op course notable for:  DGF- expected 2/2 CIT. Required HD POD 1 for hyperkalemia. Kidney function not yet clearing. Making minimal urine, not responsive to IV Lasix.Interventional Nephrology consulted for perm cath placement 5/12. Outpatient HD schedule to be set up. Last HD 5/10.  Hypotension in PACU, H/H stable. Midodrine TID for 24 hours with some improvement.   SVT- improved with  restarting home Metoprolol (originally held for hypotension post op).     Interval history : NAEON. Roman removed this am without issue. Perm cath placement for outpt HD needs. SW to set up outpt HD chair. Tentative plan for HD tomorrow. Roman removed this am without issue. VSS. Cont to monitor.    Drain removed:  Site cleaned with alcohol. Drain intact at time of removal. Steri-strip in place. Patient tolerated procedure well.  Will continue to monitor for any complications.     Past Medical, Surgical, Family, and Social History:   Unchanged from H&P.    Scheduled Meds:   bisacodyL  10 mg Oral QHS    docusate sodium  100 mg Oral TID    famotidine  20 mg Oral QHS    metoprolol succinate  100 mg Oral Daily    multivitamin  1 tablet Oral Daily    mycophenolate  1,000 mg Oral BID    NIFEdipine  30 mg Oral Daily    oxybutynin  5 mg Oral TID    predniSONE  20 mg Oral Daily    sevelamer carbonate  2,400 mg Oral TID WM    [START ON 5/17/2025] sulfamethoxazole-trimethoprim 400-80mg  1 tablet Oral Daily AM    tacrolimus  4 mg Oral BID    torsemide  100 mg Oral Daily    [START ON 5/17/2025] valGANciclovir  450 mg Oral Daily AM     Continuous Infusions:   acetaminophen    Continuous PRN   1,000 mg at 05/12/25 0819    glucagon (human recombinant)  1 mg Intramuscular Continuous PRN         PRN Meds:  Current Facility-Administered Medications:     0.9%  NaCl infusion (for blood administration), , Intravenous, Q24H PRN    acetaminophen, , , Continuous PRN    aluminum-magnesium hydroxide-simethicone, 30 mL, Oral, Q6H PRN    calcium carbonate, 500 mg, Oral, TID PRN    dextrose 50%, 12.5 g, Intravenous, PRN    diphenhydrAMINE, 50 mg, Oral, Once PRN    EPINEPHrine (PF), 1 mg, Subcutaneous, Once PRN    glucagon (human recombinant), 1 mg, Intramuscular, Continuous PRN    glucose, 16 g, Oral, PRN    glucose, 16 g, Oral, PRN    glucose, 24 g, Oral, PRN    heparin (porcine), 2,000 Units, Intra-Catheter, Daily PRN    hydrALAZINE, 10 mg,  "Intravenous, Q8H PRN    hydrocortisone sodium succinate, 100 mg, Intravenous, Once PRN    ondansetron, 4 mg, Intravenous, Q6H PRN    oxyCODONE, 5 mg, Oral, Q4H PRN    oxyCODONE, 10 mg, Oral, Q4H PRN    simethicone, 1 tablet, Oral, TID PRN    sodium chloride 0.9%, 10 mL, Intravenous, PRN    Intake/Output - Last 3 Shifts         05/10 0700  05/11 0659 05/11 0700 05/12 0659 05/12 0700  05/13 0659    P.O. 720 720     Blood 350      Total Intake(mL/kg) 1070 (15.8) 720 (11)     Urine (mL/kg/hr) 140 (0.1) 60 (0)     Drains 180 175 80    Other 3383      Stool  0     Total Output 3703 235 80    Net -2633 +485 -80           Unmeasured Urine Occurrence   1 x    Unmeasured Stool Occurrence  1 x              Review of Systems   Constitutional:  Negative for activity change, appetite change and fever.   Respiratory:  Negative for cough and shortness of breath.    Cardiovascular:  Negative for chest pain and leg swelling.   Gastrointestinal:  Positive for abdominal pain. Negative for abdominal distention, diarrhea, nausea and vomiting.   Genitourinary:  Positive for decreased urine volume. Negative for difficulty urinating.   Skin:  Negative for wound.   Allergic/Immunologic: Positive for immunocompromised state.   Neurological:  Negative for dizziness and weakness.   Psychiatric/Behavioral:  Negative for confusion. The patient is nervous/anxious.       Objective:     Vital Signs (Most Recent):  Temp: 98.1 °F (36.7 °C) (05/12/25 1310)  Pulse: 82 (05/12/25 1434)  Resp: 16 (05/12/25 1344)  BP: (!) 157/104 (05/12/25 1310)  SpO2: 96 % (05/12/25 1245) Vital Signs (24h Range):  Temp:  [97.9 °F (36.6 °C)-98.5 °F (36.9 °C)] 98.1 °F (36.7 °C)  Pulse:  [58-93] 82  Resp:  [16-20] 16  SpO2:  [96 %-100 %] 96 %  BP: (123-163)/() 157/104     Weight: 65.5 kg (144 lb 7.5 oz)  Height: 5' 2" (157.5 cm)  Body mass index is 26.42 kg/m².     Physical Exam  Vitals reviewed.   Constitutional:       Appearance: She is well-developed.   Eyes:      " Pupils: Pupils are equal, round, and reactive to light.   Cardiovascular:      Rate and Rhythm: Normal rate and regular rhythm.      Heart sounds: No murmur heard.  Pulmonary:      Effort: Pulmonary effort is normal. No respiratory distress.      Breath sounds: Normal breath sounds. No wheezing.   Abdominal:      General: Bowel sounds are normal. There is no distension.      Palpations: Abdomen is soft.      Tenderness: There is abdominal tenderness. There is no guarding.      Comments: Midline incision with dermabond  LILLIAN x 1- ss drainage   Musculoskeletal:         General: Normal range of motion.      Cervical back: Normal range of motion.   Skin:     General: Skin is warm and dry.   Neurological:      Mental Status: She is alert and oriented to person, place, and time.   Psychiatric:         Mood and Affect: Mood normal.         Behavior: Behavior normal.         Thought Content: Thought content normal.         Judgment: Judgment normal.          Laboratory:  CBC:   Recent Labs   Lab 05/10/25  0523 05/11/25  0513 05/12/25  0621   WBC 5.54 5.99 5.80   RBC 2.32* 3.02* 3.56*   HGB 7.0* 8.9* 10.5*   HCT 23.2* 28.6* 34.3*    235 308   * 95 96   MCH 30.2 29.5 29.5   MCHC 30.2* 31.1* 30.6*     CMP:   Recent Labs   Lab 05/07/25  0430 05/07/25  1811 05/10/25  0523 05/11/25  0513 05/12/25  0621   GLU 93   < > 92 83 83   CALCIUM 7.9*   < > 8.2* 8.5* 9.2   ALBUMIN 2.5*   < > 2.1* 2.3* 2.5*   PROT 5.7*  --   --   --   --       < > 138 138 137   K 3.8   < > 4.3 4.2 4.9   CO2 26   < > 23 23 21*   CL 98   < > 101 103 103   BUN 45*   < > 52* 27* 46*   CREATININE 10.3*   < > 7.2* 4.3* 6.1*   ALKPHOS 189*  --   --   --   --    ALT 16  --   --   --   --    AST 16  --   --   --   --     < > = values in this interval not displayed.     Labs within the past 24 hours have been reviewed.    Diagnostic Results:  None

## 2025-05-12 NOTE — PROGRESS NOTES
[facility] - Transplant Stepdown  Kidney Transplant  Progress Note      Reason for Follow-up: Reassessment of Kidney Transplant - 5/7/2025  (#1) recipient and management of immunosuppression.    ORGAN: LEFT KIDNEY      Donor Type: Donation after Brain Death      Donor CMV Status: Positive  Donor HBcAB:Negative  Donor HCV Status:Negative  Donor HBV JIE:   Donor HCV JIE: Negative      Subjective:   History of Present Illness:  Ms. Gabriel is a 24 y.o. White female who has ESRD 2/2 FSGS. She has had 2 previous kidney transplants ([place], 12/2020 at [facility], [place] 1st kidney lost to hyper acute rejection and 2nd kidney due to infection. She is on PD since 2021, tolerating well, denies hx peritonitis. PMH also includes: seizure and hx PRES in setting of HTN emergency, decreased bladder capacity seen by urology. Anuric. She presents as a primary candidate for a kidney transplant. Reports being in her usual state of health, denies recent illnesses or hospitalizations. Per urology, needs oxybutynin POD1 and repeat urodynamics outpatient once bladder enlarges again to assess for hostile bladder at larger volumes. Surgery scheduled for 1100 with Dr. Stern. Pre op labs and imaging pending, will be reviewed prior to transplant     Ms. Gabriel is a 24 y.o. year old female who is status post Kidney Transplant - 5/7/2025  (#1).  Her maintenance immunosuppression consists of:   Immunosuppressants (From admission, onward)      Start     Stop Route Frequency Ordered    05/10/25 1800  tacrolimus capsule 4 mg  (IP TXP KIDNEY POST-OP THYMO WITH PERIPHERAL PRECHECKED IBW)         -- Oral 2 times daily 05/10/25 1022    05/07/25 2200  mycophenolate capsule 1,000 mg  (IP TXP KIDNEY POST-OP THYMO WITH PERIPHERAL PRECHECKED IBW)         -- Oral 2 times daily 05/07/25 1807            Hospital Course:  Patient s/p DBD Ktx (#3) and PD catheter removal on 5/7/25. 5 day lyon (U-U.small bladder + 4 week stent) and LILLIAN x 1. Post-op course  notable for:  DGF- expected 2/2 CIT. Required HD POD 1 for hyperkalemia. Kidney function not yet clearing. Making minimal urine, not responsive to IV Lasix.Interventional Nephrology consulted for perm cath placement 5/12. Outpatient HD schedule to be set up. Last HD 5/10.  Hypotension in PACU, H/H stable. Midodrine TID for 24 hours with some improvement.   SVT- improved with restarting home Metoprolol (originally held for hypotension post op).     Interval history : NAEON. Roman removed this am without issue. Perm cath placement for outpt HD needs. SW to set up outpt HD chair. Tentative plan for HD tomorrow. Roman removed this am without issue. VSS. Cont to monitor.    Drain removed:  Site cleaned with alcohol. Drain intact at time of removal. Steri-strip in place. Patient tolerated procedure well.  Will continue to monitor for any complications.     Past Medical, Surgical, Family, and Social History:   Unchanged from H&P.    Scheduled Meds:   bisacodyL  10 mg Oral QHS    docusate sodium  100 mg Oral TID    famotidine  20 mg Oral QHS    metoprolol succinate  100 mg Oral Daily    multivitamin  1 tablet Oral Daily    mycophenolate  1,000 mg Oral BID    NIFEdipine  30 mg Oral Daily    oxybutynin  5 mg Oral TID    predniSONE  20 mg Oral Daily    sevelamer carbonate  2,400 mg Oral TID WM    [START ON 5/17/2025] sulfamethoxazole-trimethoprim 400-80mg  1 tablet Oral Daily AM    tacrolimus  4 mg Oral BID    torsemide  100 mg Oral Daily    [START ON 5/17/2025] valGANciclovir  450 mg Oral Daily AM     Continuous Infusions:   acetaminophen    Continuous PRN   1,000 mg at 05/12/25 0819    glucagon (human recombinant)  1 mg Intramuscular Continuous PRN         PRN Meds:  Current Facility-Administered Medications:     0.9%  NaCl infusion (for blood administration), , Intravenous, Q24H PRN    acetaminophen, , , Continuous PRN    aluminum-magnesium hydroxide-simethicone, 30 mL, Oral, Q6H PRN    calcium carbonate, 500 mg, Oral, TID  PRN    dextrose 50%, 12.5 g, Intravenous, PRN    diphenhydrAMINE, 50 mg, Oral, Once PRN    EPINEPHrine (PF), 1 mg, Subcutaneous, Once PRN    glucagon (human recombinant), 1 mg, Intramuscular, Continuous PRN    glucose, 16 g, Oral, PRN    glucose, 16 g, Oral, PRN    glucose, 24 g, Oral, PRN    heparin (porcine), 2,000 Units, Intra-Catheter, Daily PRN    hydrALAZINE, 10 mg, Intravenous, Q8H PRN    hydrocortisone sodium succinate, 100 mg, Intravenous, Once PRN    ondansetron, 4 mg, Intravenous, Q6H PRN    oxyCODONE, 5 mg, Oral, Q4H PRN    oxyCODONE, 10 mg, Oral, Q4H PRN    simethicone, 1 tablet, Oral, TID PRN    sodium chloride 0.9%, 10 mL, Intravenous, PRN    Intake/Output - Last 3 Shifts         05/10 0700 05/11 0659 05/11 0700 05/12 0659 05/12 0700  05/13 0659    P.O. 720 720     Blood 350      Total Intake(mL/kg) 1070 (15.8) 720 (11)     Urine (mL/kg/hr) 140 (0.1) 60 (0)     Drains 180 175 80    Other 3383      Stool  0     Total Output 3703 235 80    Net -2633 +485 -80           Unmeasured Urine Occurrence   1 x    Unmeasured Stool Occurrence  1 x              Review of Systems   Constitutional:  Negative for activity change, appetite change and fever.   Respiratory:  Negative for cough and shortness of breath.    Cardiovascular:  Negative for chest pain and leg swelling.   Gastrointestinal:  Positive for abdominal pain. Negative for abdominal distention, diarrhea, nausea and vomiting.   Genitourinary:  Positive for decreased urine volume. Negative for difficulty urinating.   Skin:  Negative for wound.   Allergic/Immunologic: Positive for immunocompromised state.   Neurological:  Negative for dizziness and weakness.   Psychiatric/Behavioral:  Negative for confusion. The patient is nervous/anxious.       Objective:     Vital Signs (Most Recent):  Temp: 98.1 °F (36.7 °C) (05/12/25 1310)  Pulse: 82 (05/12/25 1434)  Resp: 16 (05/12/25 1344)  BP: (!) 157/104 (05/12/25 1310)  SpO2: 96 % (05/12/25 1245) Vital Signs  "(24h Range):  Temp:  [97.9 °F (36.6 °C)-98.5 °F (36.9 °C)] 98.1 °F (36.7 °C)  Pulse:  [58-93] 82  Resp:  [16-20] 16  SpO2:  [96 %-100 %] 96 %  BP: (123-163)/() 157/104     Weight: 65.5 kg (144 lb 7.5 oz)  Height: 5' 2" (157.5 cm)  Body mass index is 26.42 kg/m².     Physical Exam  Vitals reviewed.   Constitutional:       Appearance: She is well-developed.   Eyes:      Pupils: Pupils are equal, round, and reactive to light.   Cardiovascular:      Rate and Rhythm: Normal rate and regular rhythm.      Heart sounds: No murmur heard.  Pulmonary:      Effort: Pulmonary effort is normal. No respiratory distress.      Breath sounds: Normal breath sounds. No wheezing.   Abdominal:      General: Bowel sounds are normal. There is no distension.      Palpations: Abdomen is soft.      Tenderness: There is abdominal tenderness. There is no guarding.      Comments: Midline incision with dermabond  LILLIAN x 1- ss drainage   Musculoskeletal:         General: Normal range of motion.      Cervical back: Normal range of motion.   Skin:     General: Skin is warm and dry.   Neurological:      Mental Status: She is alert and oriented to person, place, and time.   Psychiatric:         Mood and Affect: Mood normal.         Behavior: Behavior normal.         Thought Content: Thought content normal.         Judgment: Judgment normal.          Laboratory:  CBC:   Recent Labs   Lab 05/10/25  0523 05/11/25  0513 05/12/25  0621   WBC 5.54 5.99 5.80   RBC 2.32* 3.02* 3.56*   HGB 7.0* 8.9* 10.5*   HCT 23.2* 28.6* 34.3*    235 308   * 95 96   MCH 30.2 29.5 29.5   MCHC 30.2* 31.1* 30.6*     CMP:   Recent Labs   Lab 05/07/25  0430 05/07/25  1811 05/10/25  0523 05/11/25  0513 05/12/25  0621   GLU 93   < > 92 83 83   CALCIUM 7.9*   < > 8.2* 8.5* 9.2   ALBUMIN 2.5*   < > 2.1* 2.3* 2.5*   PROT 5.7*  --   --   --   --       < > 138 138 137   K 3.8   < > 4.3 4.2 4.9   CO2 26   < > 23 23 21*   CL 98   < > 101 103 103   BUN 45*   < > 52* " "27* 46*   CREATININE 10.3*   < > 7.2* 4.3* 6.1*   ALKPHOS 189*  --   --   --   --    ALT 16  --   --   --   --    AST 16  --   --   --   --     < > = values in this interval not displayed.     Labs within the past 24 hours have been reviewed.    Diagnostic Results:  None  Assessment/Plan:     * FSGS (focal segmental glomerulosclerosis)  - daily urine Pr/Cr      Delayed graft function of kidney transplant due to ATN requiring acute dialysis  - DGF post-op  - Last HD 5/10  - Working on setting up outpt chair      Prophylactic immunotherapy  - Continue Cellcept and Prograf. Will monitor for signs of toxic side effects, check daily tacrolimus troughs, and change meds accordingly.       Long-term use of immunosuppressant medication  - see "prophylactic immuno"      At risk for opportunistic infections  - Valcyte for CMV prophylaxis  - Bactrim for PCP prophylaxis   To be started on POD 10      Status post -donor kidney transplantation  - s/p DBD Ktx (#3) and PD cath removal   - 5 day lyon (U-U/small bladder). 4 week stent. LILLIAN x 1- ss drainage  - HD POD 1 for hyperkalemia  - making minimal urine. Unresponsive to lasix.   - Last HD 5/10 (+1u prbc)  - Interventional Nephrology consulted for perm cath placement today.  - lyon removed this am without issue        Hyperglycemia  - endocrine consulted for BG management       Failed kidney transplant x2        Anemia in chronic kidney disease, on chronic dialysis  - h/h stable   - monitor with CBC       Benign hypertension with ESRD (end-stage renal disease)  - w history of PRESS 2/2 hypertensive emergency   - monitor closely, add anti-hypertensive agents as needed  *had hypotension post-op requiring midodrine. Received Midodrine for 24 hrs, dc'd .  Monitor closely for elevated BP  - TTE with EF 65%  - BP stable            Discharge Planning: plan to d/c tomorrow  Medical decision making for this encounter includes review of pertinent labs and diagnostic studies, " assessment and planning, discussions with consulting providers, discussion with patient/family, and participation in multidisciplinary rounds. Time spent caring for patient: 60 minutes    Bibi Simms PA-C  Kidney Transplant  [facility] - Transplant Stepdown

## 2025-05-13 VITALS
WEIGHT: 142.5 LBS | OXYGEN SATURATION: 100 % | RESPIRATION RATE: 18 BRPM | HEIGHT: 62 IN | TEMPERATURE: 98 F | SYSTOLIC BLOOD PRESSURE: 134 MMHG | BODY MASS INDEX: 26.22 KG/M2 | HEART RATE: 71 BPM | DIASTOLIC BLOOD PRESSURE: 95 MMHG

## 2025-05-13 PROBLEM — I95.9 HYPOTENSION: Status: ACTIVE | Noted: 2025-05-13

## 2025-05-13 LAB
ABSOLUTE EOSINOPHIL (OHS): 0.02 K/UL
ABSOLUTE MONOCYTE (OHS): 0.47 K/UL (ref 0.3–1)
ABSOLUTE NEUTROPHIL COUNT (OHS): 3.97 K/UL (ref 1.8–7.7)
ALBUMIN SERPL BCP-MCNC: 2.5 G/DL (ref 3.5–5.2)
ANION GAP (OHS): 14 MMOL/L (ref 8–16)
BASOPHILS # BLD AUTO: 0.01 K/UL
BASOPHILS NFR BLD AUTO: 0.2 %
BUN SERPL-MCNC: 61 MG/DL (ref 6–20)
CALCIUM SERPL-MCNC: 9.3 MG/DL (ref 8.7–10.5)
CHLORIDE SERPL-SCNC: 103 MMOL/L (ref 95–110)
CO2 SERPL-SCNC: 21 MMOL/L (ref 23–29)
CREAT SERPL-MCNC: 7.3 MG/DL (ref 0.5–1.4)
ERYTHROCYTE [DISTWIDTH] IN BLOOD BY AUTOMATED COUNT: 15.8 % (ref 11.5–14.5)
GFR SERPLBLD CREATININE-BSD FMLA CKD-EPI: 7 ML/MIN/1.73/M2
GLUCOSE SERPL-MCNC: 77 MG/DL (ref 70–110)
HCT VFR BLD AUTO: 31.2 % (ref 37–48.5)
HGB BLD-MCNC: 9.7 GM/DL (ref 12–16)
IMM GRANULOCYTES # BLD AUTO: 0.05 K/UL (ref 0–0.04)
IMM GRANULOCYTES NFR BLD AUTO: 1 % (ref 0–0.5)
LYMPHOCYTES # BLD AUTO: 0.38 K/UL (ref 1–4.8)
MAGNESIUM SERPL-MCNC: 2.7 MG/DL (ref 1.6–2.6)
MCH RBC QN AUTO: 29.7 PG (ref 27–31)
MCHC RBC AUTO-ENTMCNC: 31.1 G/DL (ref 32–36)
MCV RBC AUTO: 95 FL (ref 82–98)
NUCLEATED RBC (/100WBC) (OHS): 0 /100 WBC
PHOSPHATE SERPL-MCNC: 8 MG/DL (ref 2.7–4.5)
PLATELET # BLD AUTO: 296 K/UL (ref 150–450)
PMV BLD AUTO: 8.8 FL (ref 9.2–12.9)
POTASSIUM SERPL-SCNC: 5.2 MMOL/L (ref 3.5–5.1)
RBC # BLD AUTO: 3.27 M/UL (ref 4–5.4)
RELATIVE EOSINOPHIL (OHS): 0.4 %
RELATIVE LYMPHOCYTE (OHS): 7.8 % (ref 18–48)
RELATIVE MONOCYTE (OHS): 9.6 % (ref 4–15)
RELATIVE NEUTROPHIL (OHS): 81 % (ref 38–73)
SODIUM SERPL-SCNC: 138 MMOL/L (ref 136–145)
TACROLIMUS BLD-MCNC: 7.4 NG/ML (ref 5–15)
WBC # BLD AUTO: 4.9 K/UL (ref 3.9–12.7)

## 2025-05-13 PROCEDURE — 80197 ASSAY OF TACROLIMUS: CPT | Performed by: STUDENT IN AN ORGANIZED HEALTH CARE EDUCATION/TRAINING PROGRAM

## 2025-05-13 PROCEDURE — 63600175 PHARM REV CODE 636 W HCPCS: Performed by: STUDENT IN AN ORGANIZED HEALTH CARE EDUCATION/TRAINING PROGRAM

## 2025-05-13 PROCEDURE — 25000003 PHARM REV CODE 250: Performed by: INTERNAL MEDICINE

## 2025-05-13 PROCEDURE — 63600175 PHARM REV CODE 636 W HCPCS: Mod: UD | Performed by: INTERNAL MEDICINE

## 2025-05-13 PROCEDURE — 25000003 PHARM REV CODE 250: Performed by: PHYSICIAN ASSISTANT

## 2025-05-13 PROCEDURE — 85025 COMPLETE CBC W/AUTO DIFF WBC: CPT | Performed by: STUDENT IN AN ORGANIZED HEALTH CARE EDUCATION/TRAINING PROGRAM

## 2025-05-13 PROCEDURE — 36415 COLL VENOUS BLD VENIPUNCTURE: CPT | Performed by: STUDENT IN AN ORGANIZED HEALTH CARE EDUCATION/TRAINING PROGRAM

## 2025-05-13 PROCEDURE — 80100014 HC HEMODIALYSIS 1:1

## 2025-05-13 PROCEDURE — 25000003 PHARM REV CODE 250: Performed by: NURSE PRACTITIONER

## 2025-05-13 PROCEDURE — 25000003 PHARM REV CODE 250: Performed by: STUDENT IN AN ORGANIZED HEALTH CARE EDUCATION/TRAINING PROGRAM

## 2025-05-13 PROCEDURE — 83735 ASSAY OF MAGNESIUM: CPT | Performed by: STUDENT IN AN ORGANIZED HEALTH CARE EDUCATION/TRAINING PROGRAM

## 2025-05-13 PROCEDURE — 80069 RENAL FUNCTION PANEL: CPT | Performed by: STUDENT IN AN ORGANIZED HEALTH CARE EDUCATION/TRAINING PROGRAM

## 2025-05-13 PROCEDURE — 99239 HOSP IP/OBS DSCHRG MGMT >30: CPT | Mod: 24,,, | Performed by: NURSE PRACTITIONER

## 2025-05-13 RX ORDER — CALCIUM CARBONATE 200(500)MG
1500 TABLET,CHEWABLE ORAL
COMMUNITY
Start: 2025-05-13

## 2025-05-13 RX ORDER — TORSEMIDE 100 MG/1
100 TABLET ORAL DAILY
Qty: 7 TABLET | Refills: 0 | Status: SHIPPED | OUTPATIENT
Start: 2025-05-13 | End: 2025-05-16 | Stop reason: SDUPTHER

## 2025-05-13 RX ORDER — OXYBUTYNIN CHLORIDE 5 MG/1
5 TABLET ORAL 3 TIMES DAILY
Qty: 90 TABLET | Refills: 1 | Status: SHIPPED | OUTPATIENT
Start: 2025-05-13

## 2025-05-13 RX ORDER — CALCIUM CARBONATE 200(500)MG
2000 TABLET,CHEWABLE ORAL
Status: ON HOLD | COMMUNITY
End: 2025-05-13

## 2025-05-13 RX ORDER — CALCIUM CARBONATE 200(500)MG
2000 TABLET,CHEWABLE ORAL
Status: DISCONTINUED | OUTPATIENT
Start: 2025-05-13 | End: 2025-05-13 | Stop reason: HOSPADM

## 2025-05-13 RX ADMIN — TORSEMIDE 100 MG: 20 TABLET ORAL at 12:05

## 2025-05-13 RX ADMIN — MYCOPHENOLATE MOFETIL 1000 MG: 250 CAPSULE ORAL at 12:05

## 2025-05-13 RX ADMIN — METOPROLOL SUCCINATE 100 MG: 50 TABLET, EXTENDED RELEASE ORAL at 12:05

## 2025-05-13 RX ADMIN — OXYBUTYNIN CHLORIDE 5 MG: 5 TABLET ORAL at 07:05

## 2025-05-13 RX ADMIN — OXYBUTYNIN CHLORIDE 5 MG: 5 TABLET ORAL at 03:05

## 2025-05-13 RX ADMIN — DOCUSATE SODIUM 100 MG: 100 CAPSULE, LIQUID FILLED ORAL at 12:05

## 2025-05-13 RX ADMIN — NIFEDIPINE 30 MG: 30 TABLET, FILM COATED, EXTENDED RELEASE ORAL at 07:05

## 2025-05-13 RX ADMIN — PREDNISONE 20 MG: 20 TABLET ORAL at 12:05

## 2025-05-13 RX ADMIN — THERA TABS 1 TABLET: TAB at 12:05

## 2025-05-13 RX ADMIN — HEPARIN SODIUM 2000 UNITS: 1000 INJECTION INTRAVENOUS; SUBCUTANEOUS at 11:05

## 2025-05-13 RX ADMIN — TACROLIMUS 4 MG: 1 CAPSULE ORAL at 07:05

## 2025-05-13 RX ADMIN — CALCIUM CARBONATE (ANTACID) CHEW TAB 500 MG 2000 MG: 500 CHEW TAB at 12:05

## 2025-05-13 NOTE — PLAN OF CARE
Cr=7.3 inc from 6.1. K+=5.2. Phos=8.0. Renvela changed to Tums. CO2=21MG+=2.7. HD completed this am. Tolerated well. Afebrile. R perm cath intact without drainage noted. No drainage noted to old PD cath site and old LILLIAN site steristrips. Denies pain. Prepared am meds correctly per self-meds. Brother at BS.

## 2025-05-13 NOTE — PLAN OF CARE
Problem: Hemodialysis  Goal: Safe, Effective Therapy Delivery  Outcome: Progressing      Ambulatory

## 2025-05-13 NOTE — NURSING
Order Questions    Question Answer   Antibiotics on HD? No   Duration of Treatment 3.5 hours   Dialyzer F160   Dialysate Temperature (C) 37   Target  mL/min   If unable to maintain flow due to inadequate vascular access patency, patient intolerance (i.e. chest pain, access discomfort) or elevated venous pressure, adjust blood flow rate to a minimum of _____mL/min 100    mL/min   K+ Potassium per Protocol   Ca++ Calcium per Protocol   Na+ 140 MEQ/L   Bicarb 35 meq   Access to be used Other (please specify)   Target UF 0L   If unable to maintain this UFR due to patient intolerance (i.e. hypotension, chest pain, muscle cramping, nausea or vomiting), adjust UFR to achieve a minimum of _______ liters of UF 0   Fluid Removal Instructions maintain SBP > 90 mmHG

## 2025-05-13 NOTE — PROGRESS NOTES
Kidney Transplant  Discharge Note :     Per KTS rounds, this patient is expected to discharge today 5-. This SW met with the patient's primary caregiver brother Edouard Blanco at bedside due to patient being off the unit at dialysis. This SW provided the patient's caregiver with a print out of the name, address, and contact information for [facility]. This SW informed the patient's caregiver that the patient's dialysis days are MWF for 11:00 am and first visit  is at 10:30 am which was placed on the print out as well. This SW informed the patient's brother that the patient's first dialysis is tomorrow 5- per the patient's care team and the center has been notified.  The patient will discharge to the brother's home at [address] [place] [address] under the care of her brother. The patient's brother will be providing discharge transportation. The patient's caregiver reported that the patient is coping appropriately with the discharge. The patient's caregiver reported that the patient is ready to go home. The patient's caregiver did not have any concerns with the discharge plan at this time.  No psychosocial needs indicated for discharge at this time.  SW remains available at [phone].    3:00 pm   This SW met with the patient and brother at bedside to follow-up regarding discharge.  This SW discussed with the patient dialysis at [facility]. This SW discussed with the patient and her brother the start of care which is 5- and her dialysis days MWF for 11:00 am (tomorrow 10:30 am).   Pt aware of, involved in, and coping well with this discharge plan. Pt did not have any concerns with the discharge plan at this time.         Neftali Ng LCSW, Kidney Transplant   [facility]  [facility] [place] [address]

## 2025-05-13 NOTE — PROGRESS NOTES
Transplant Coordinator  5/7-5/13/25    Pt admitted for a cadaveric kdiney re-transplant. Pt had Txp #1 in 2015 and #2 in 2020. ESRD 2/2 FSGS/h/o of urinary infections/rejections. KDPI 49%, cPRA 100%. Thymo induction, CMV D+,R- (MISMATCH). CIT>24hrs    Pt with DGF post-txp. Making minimal UOP, not clearing. [place] [facility] [place] 11am.   Permcath placed 5/12/25. Last inpt HD 5/13/25.  Pt will d/c on PO Torsemide    Incision LAKIA with staples  UU 2/2 to small bladder (stent remains for 4 week)       ASA ON HOLD- plan to Restart 5/27/25.    HD Wednesday, 5/14/25.   Pt will come to clinic for labs and appts Thursday, 5/15/25.

## 2025-05-13 NOTE — PROGRESS NOTES
05/13/25 0800   Vital Signs   Temp 97.5 °F (36.4 °C)   Temp Source Oral   Pulse 68   Heart Rate Source Monitor   Resp 16   Device (Oxygen Therapy) room air   BP (!) 149/109   MAP (mmHg) 126   BP Location Right arm   BP Method Automatic   Patient Position Lying     Received pt via w/c alert and oriented.  POC discussed with the pt and is stable for tx.  HD tx started aseptically via RIJ TDC with good flow.  Enhanced respiratory isolation precaution maintained.

## 2025-05-13 NOTE — PROGRESS NOTES
DISCHARGE NOTE:    Anna Gabriel is a 24 y.o. female s/p LEFT KIDNEY     Donation after Brain Death     transplant on 5/7/2025 (Kidney) for ESRD secondary to Other, Specify - Retransplant.      Past Medical History:   Diagnosis Date    Anemia     Encounter for blood transfusion     FSGS (focal segmental glomerulosclerosis)     Hypertension     Hyperthyroidism     Kidney transplant status     Kidney Rejection transplant kidney       Hospital Course: Complicated course by DGF, outpatient HD MWF and torsemide at discharge.  This is patient's 3rd transplant - cPRA 100%. Also, per urology, patient does need to be oxybutynin.      Allergies: Review of patient's allergies indicates:  No Known Allergies    [facility]    Discharge Medications:     Medication List        PAUSE taking these medications      aspirin 81 MG Chew  Wait to take this until: May 27, 2025            START taking these medications      famotidine 20 MG tablet  Commonly known as: PEPCID  Take 1 tablet (20 mg total) by mouth every evening.     multivitamin Tab  Take 1 tablet by mouth once daily.     mycophenolate 250 mg Cap  Commonly known as: CELLCEPT  Take 4 capsules (1,000 mg total) by mouth 2 (two) times daily.     NIFEdipine 30 MG (OSM) 24 hr tablet  Commonly known as: PROCARDIA-XL  Take 1 tablet (30 mg total) by mouth once daily.     oxybutynin 5 MG Tab  Commonly known as: DITROPAN  Take 1 tablet (5 mg total) by mouth 3 (three) times daily.     oxyCODONE 10 mg Tab immediate release tablet  Commonly known as: ROXICODONE  Take 1/2-1 tablet (5-10 mg total) by mouth every 4 (four) hours as needed for Pain.     predniSONE 5 MG tablet  Commonly known as: DELTASONE  Take by mouth daily: 5/9 to 5/15 20 mg, 5/16 to 5/22 15 mg, 5/23 to 5/29 10 mg, 5/30/25 start 5 mg oral daily, DO NOT DISCONTINUE     sulfamethoxazole-trimethoprim 400-80mg 400-80 mg per tablet  Commonly known as: BACTRIM,SEPTRA  Take 1 tablet by mouth every Mon, Wed, Fri. Stop  11/4/2025     tacrolimus 1 MG Cap  Commonly known as: PROGRAF  Take 4 capsules (4 mg total) by mouth every 12 (twelve) hours.     torsemide 100 MG Tab  Commonly known as: DEMADEX  Take 1 tablet (100 mg total) by mouth once daily. for 7 days     valGANciclovir 450 mg Tab  Commonly known as: VALCYTE  Take 1 tablet (450 mg total) by mouth every Mon, Wed, Fri. Stop 11/4/2025            CHANGE how you take these medications      calcium carbonate 200 mg calcium (500 mg) chewable tablet  Commonly known as: TUMS  Take 3 tablets (1,500 mg total) by mouth 3 (three) times daily with meals.  What changed:   how much to take  Another medication with the same name was removed. Continue taking this medication, and follow the directions you see here.     metoprolol succinate 100 MG 24 hr tablet  Commonly known as: TOPROL-XL  Take 1 tablet (100 mg total) by mouth once daily.  What changed:   how much to take  when to take this            CONTINUE taking these medications      heparin (porcine) 1,000 unit/mL injection            STOP taking these medications      calcium carbonate 500 mg calcium (1,250 mg) chewable tablet  Commonly known as: OS-JULIAN     cinacalcet 30 MG Tab  Commonly known as: SENSIPAR     ergocalciferol 50,000 unit Cap  Commonly known as: ERGOCALCIFEROL     folic acid 1 MG tablet  Commonly known as: FOLVITE     hydrALAZINE 50 MG tablet  Commonly known as: APRESOLINE     losartan 100 MG tablet  Commonly known as: COZAAR     MIRCERA 100 mcg/0.3 mL Syrg  Generic drug: epoetin beta, methoxy peg     pantoprazole 40 MG tablet  Commonly known as: PROTONIX     GOPAL-TORIE RX 1- mg-mg-mcg Tab  Generic drug: vit b cmplx 3-fa-vit c-biotin 1- mg-mg-mcg               Where to Get Your Medications        These medications were sent to [facility] [place] [address] [address] [facility] [address]      Hours: Always Open Phone: [phone]   famotidine 20 MG tablet  metoprolol succinate 100 MG 24 hr  tablet  mycophenolate 250 mg Cap  NIFEdipine 30 MG (OSM) 24 hr tablet  oxybutynin 5 MG Tab  oxyCODONE 10 mg Tab immediate release tablet  predniSONE 5 MG tablet  sulfamethoxazole-trimethoprim 400-80mg 400-80 mg per tablet  tacrolimus 1 MG Cap  torsemide 100 MG Tab  valGANciclovir 450 mg Tab       You can get these medications from any pharmacy    You don't need a prescription for these medications  calcium carbonate 200 mg calcium (500 mg) chewable tablet  multivitamin Tab          Pharmacy Interventions/Recommendations:  1) Transplant Immunosuppression: Induction Thymo, and maintenance tac/mmf/pred taper     2) Opportunistic Infection prophylaxis: Bactrim x6 mo, valcyte x 6 mo    3) Osteoporosis Prevention measures (liver txp): n/a    4) Patient Counseling/Education: Demonstrated the use of the BP cuff, thermometer.    5) Follow-Up/Discharge Needs:    -adjust bactrim and valcyte as kidney fn improves    6) Patient Assistance Information: none    7) The following medications have been placed on HOLD and should be restarted in the outpatient setting (when appropriate): none    Anna and her caregiver verbalized their understanding and had the opportunity to ask questions.

## 2025-05-13 NOTE — PROGRESS NOTES
HD tx completed and pt tolerated well.  Dialyzed for 3.5hrs with no fluid removed per order.  Pt is awake,alert,oriented and stable.  Report given to RUBÉN Webb.

## 2025-05-13 NOTE — NURSING
Discharge instructions given and reviewed with pt and brother. Pharm D completed med teaching. Supplies for home given by coordinator. Verbalized understanding.

## 2025-05-13 NOTE — PROGRESS NOTES
This SW contacted [facility] to follow-up regarding this patient's referral. This SW spoke with Brenda with [facility]. Per Brenda with [facility], this patient has been accepted for M, W, and F for 11:00 am. Brenda inquired about the patient start of care with [facility].    This SW sent the patient's care team a secure chat providing a update regarding the patient being set-up with [facility] on M, W, F 11:00am. This SW informed the patient's care team that [facility] is inquiring about this patient's start of care.     This SW received a phone call from the [facility] clinic manager requesting a progress note stating that patient's dialysis treatment needed from their facility.     This SW sent a message to the patient's care team requesting a not with the patient's dialysis treatment to forward to [facility].       10:17 AM  This SW was notified by Dr. Dos Santos that the patient would need dialysis on tomorrow 5-. This SW faxed the patient's dialysis treatment to [facility]. The SW contacted [facility] and spoke with Brenda. This SW notified Brenda that the patient would need dialysis on tomorrow 5- per the patient's MD.      10:30 am  This SW contacted Brenda with [facility] to follow-up regarding the patient's dialysis treatment. Brenda confirmed delivery of the patient's dialysis treatment.       The patient has been successfully set-up with [facility].     Neftali Ng LCSW, Kidney Transplant   [facility] [address] [address]

## 2025-05-13 NOTE — PLAN OF CARE
AAOx4. VSS. On RA. Ambulating the halls independently. Pt reports sensations of urgency, and some urinary incontinence. Urinating a limited amnt of brown urine. Perm cath in place wrapped in surgical dressing CD&I. Old LILLIAN site closed with steristrips CD&I. Incision with dermabond CD&I. Plans for HD in AM and discharge. NSR on telemetry. Self meds pulled 100%. Brother is @ bedside, attentive to patient. Bed in low position. Side rails raisedx2, wheels locked. Nonskid shoes when OOB. Call bell and personal items within reach.

## 2025-05-14 LAB — BACTERIA SPEC ANAEROBE CULT: NORMAL

## 2025-05-15 ENCOUNTER — CLINICAL SUPPORT (OUTPATIENT)
Dept: TRANSPLANT | Facility: CLINIC | Age: 25
End: 2025-05-15
Payer: MEDICARE

## 2025-05-15 ENCOUNTER — PATIENT MESSAGE (OUTPATIENT)
Dept: TRANSPLANT | Facility: CLINIC | Age: 25
End: 2025-05-15
Payer: MEDICARE

## 2025-05-15 ENCOUNTER — LAB VISIT (OUTPATIENT)
Dept: LAB | Facility: HOSPITAL | Age: 25
End: 2025-05-15
Attending: STUDENT IN AN ORGANIZED HEALTH CARE EDUCATION/TRAINING PROGRAM
Payer: MEDICARE

## 2025-05-15 ENCOUNTER — RESULTS FOLLOW-UP (OUTPATIENT)
Dept: TRANSPLANT | Facility: HOSPITAL | Age: 25
End: 2025-05-15

## 2025-05-15 VITALS
SYSTOLIC BLOOD PRESSURE: 126 MMHG | BODY MASS INDEX: 26.01 KG/M2 | OXYGEN SATURATION: 100 % | HEIGHT: 62 IN | HEART RATE: 77 BPM | RESPIRATION RATE: 18 BRPM | WEIGHT: 141.31 LBS | DIASTOLIC BLOOD PRESSURE: 84 MMHG | TEMPERATURE: 98 F

## 2025-05-15 DIAGNOSIS — N18.9 ANEMIA OF RENAL DISEASE: ICD-10-CM

## 2025-05-15 DIAGNOSIS — N39.0 RECURRENT UTI: Primary | ICD-10-CM

## 2025-05-15 DIAGNOSIS — N39.0 URINARY TRACT INFECTION WITHOUT HEMATURIA, SITE UNSPECIFIED: ICD-10-CM

## 2025-05-15 DIAGNOSIS — D63.1 ANEMIA OF RENAL DISEASE: ICD-10-CM

## 2025-05-15 DIAGNOSIS — Z94.0 KIDNEY REPLACED BY TRANSPLANT: ICD-10-CM

## 2025-05-15 DIAGNOSIS — Z91.89 PERSONAL HISTORY OF POISONING, PRESENTING HAZARDS TO HEALTH: ICD-10-CM

## 2025-05-15 DIAGNOSIS — Z94.0 KIDNEY REPLACED BY TRANSPLANT: Primary | ICD-10-CM

## 2025-05-15 DIAGNOSIS — Z57.8 EMPLOYEE EXPOSURE TO BLOOD: ICD-10-CM

## 2025-05-15 LAB
25(OH)D3+25(OH)D2 SERPL-MCNC: 26 NG/ML (ref 30–96)
ABSOLUTE EOSINOPHIL (OHS): 0.2 K/UL
ABSOLUTE MONOCYTE (OHS): 0.59 K/UL (ref 0.3–1)
ABSOLUTE NEUTROPHIL COUNT (OHS): 4.21 K/UL (ref 1.8–7.7)
ALBUMIN SERPL BCP-MCNC: 2.8 G/DL (ref 3.5–5.2)
ANION GAP (OHS): 8 MMOL/L (ref 8–16)
BASOPHILS # BLD AUTO: 0.03 K/UL
BASOPHILS NFR BLD AUTO: 0.5 %
BUN SERPL-MCNC: 22 MG/DL (ref 6–20)
CALCIUM SERPL-MCNC: 10 MG/DL (ref 8.7–10.5)
CHLORIDE SERPL-SCNC: 97 MMOL/L (ref 95–110)
CO2 SERPL-SCNC: 33 MMOL/L (ref 23–29)
CREAT SERPL-MCNC: 3.5 MG/DL (ref 0.5–1.4)
ERYTHROCYTE [DISTWIDTH] IN BLOOD BY AUTOMATED COUNT: 15.6 % (ref 11.5–14.5)
GFR SERPLBLD CREATININE-BSD FMLA CKD-EPI: 18 ML/MIN/1.73/M2
GLUCOSE SERPL-MCNC: 79 MG/DL (ref 70–110)
HCT VFR BLD AUTO: 31.7 % (ref 37–48.5)
HGB BLD-MCNC: 9.6 GM/DL (ref 12–16)
IMM GRANULOCYTES # BLD AUTO: 0.05 K/UL (ref 0–0.04)
IMM GRANULOCYTES NFR BLD AUTO: 0.9 % (ref 0–0.5)
LYMPHOCYTES # BLD AUTO: 0.65 K/UL (ref 1–4.8)
MAGNESIUM SERPL-MCNC: 2.1 MG/DL (ref 1.6–2.6)
MCH RBC QN AUTO: 29.2 PG (ref 27–31)
MCHC RBC AUTO-ENTMCNC: 30.3 G/DL (ref 32–36)
MCV RBC AUTO: 96 FL (ref 82–98)
NUCLEATED RBC (/100WBC) (OHS): 0 /100 WBC
PHOSPHATE SERPL-MCNC: 4.7 MG/DL (ref 2.7–4.5)
PLATELET # BLD AUTO: 287 K/UL (ref 150–450)
PMV BLD AUTO: 8.7 FL (ref 9.2–12.9)
POTASSIUM SERPL-SCNC: 4 MMOL/L (ref 3.5–5.1)
PTH-INTACT SERPL-MCNC: 719.5 PG/ML (ref 9–77)
RBC # BLD AUTO: 3.29 M/UL (ref 4–5.4)
RELATIVE EOSINOPHIL (OHS): 3.5 %
RELATIVE LYMPHOCYTE (OHS): 11.3 % (ref 18–48)
RELATIVE MONOCYTE (OHS): 10.3 % (ref 4–15)
RELATIVE NEUTROPHIL (OHS): 73.5 % (ref 38–73)
SODIUM SERPL-SCNC: 138 MMOL/L (ref 136–145)
TACROLIMUS BLD-MCNC: 8 NG/ML (ref 5–15)
WBC # BLD AUTO: 5.73 K/UL (ref 3.9–12.7)

## 2025-05-15 PROCEDURE — 82306 VITAMIN D 25 HYDROXY: CPT

## 2025-05-15 PROCEDURE — 99999 PR PBB SHADOW E&M-EST. PATIENT-LVL III: CPT | Mod: PBBFAC,,,

## 2025-05-15 PROCEDURE — 83970 ASSAY OF PARATHORMONE: CPT

## 2025-05-15 PROCEDURE — 83735 ASSAY OF MAGNESIUM: CPT

## 2025-05-15 PROCEDURE — 36415 COLL VENOUS BLD VENIPUNCTURE: CPT

## 2025-05-15 PROCEDURE — 80197 ASSAY OF TACROLIMUS: CPT

## 2025-05-15 PROCEDURE — 85025 COMPLETE CBC W/AUTO DIFF WBC: CPT

## 2025-05-15 PROCEDURE — 80069 RENAL FUNCTION PANEL: CPT

## 2025-05-15 PROCEDURE — 99213 OFFICE O/P EST LOW 20 MIN: CPT | Mod: PBBFAC

## 2025-05-15 SDOH — SOCIAL DETERMINANTS OF HEALTH (SDOH): OCCUPATIONAL EXPOSURE TO OTHER RISK FACTORS: Z57.8

## 2025-05-16 ENCOUNTER — TELEPHONE (OUTPATIENT)
Dept: TRANSPLANT | Facility: CLINIC | Age: 25
End: 2025-05-16
Payer: MEDICARE

## 2025-05-16 DIAGNOSIS — Z94.0 STATUS POST DECEASED-DONOR KIDNEY TRANSPLANTATION: ICD-10-CM

## 2025-05-16 RX ORDER — TORSEMIDE 100 MG/1
100 TABLET ORAL DAILY
Qty: 30 TABLET | Refills: 0 | Status: SHIPPED | OUTPATIENT
Start: 2025-05-16 | End: 2025-06-15

## 2025-05-16 NOTE — TELEPHONE ENCOUNTER
----- Message from Colton Dos Santos MD sent at 5/16/2025 11:00 AM CDT -----  Regarding: RE: Consult/Advisory  Contact: Anna Gabriel  Depends on urine output. We probably should have given her a month supply. If her urine output is less than 1 liter she should continue them  ----- Message -----  From: Effie Cummings RN  Sent: 5/16/2025   9:45 AM CDT  To: Colton Dos Santos Jr., MD  Subject: FW: Consult/Advisory                             Please advise  ----- Message -----  From: Rachana Patel  Sent: 5/15/2025   4:50 PM CDT  To: [facility] Post-Kidney Transplant Clinical  Subject: Consult/Advisory                                  Consult/Advisory Name Of Caller:Anna Gabriel   Contact Preference:[phone] (home)   Nature of call:Patient is calling to speak to Effie and find out if she needs to continue with torsemide (DEMADEX) 100 MG Tab and get it refilled. Requesting a call back

## 2025-05-19 ENCOUNTER — LAB VISIT (OUTPATIENT)
Dept: LAB | Facility: HOSPITAL | Age: 25
End: 2025-05-19
Attending: STUDENT IN AN ORGANIZED HEALTH CARE EDUCATION/TRAINING PROGRAM
Payer: COMMERCIAL

## 2025-05-19 ENCOUNTER — RESULTS FOLLOW-UP (OUTPATIENT)
Dept: TRANSPLANT | Facility: HOSPITAL | Age: 25
End: 2025-05-19
Payer: MEDICARE

## 2025-05-19 DIAGNOSIS — Z94.0 STATUS POST DECEASED-DONOR KIDNEY TRANSPLANTATION: Primary | ICD-10-CM

## 2025-05-19 DIAGNOSIS — Z94.0 KIDNEY REPLACED BY TRANSPLANT: ICD-10-CM

## 2025-05-19 LAB
ABSOLUTE EOSINOPHIL (SMH): 0.32 K/UL
ABSOLUTE MONOCYTE (SMH): 0.37 K/UL (ref 0.3–1)
ABSOLUTE NEUTROPHIL COUNT (SMH): 4.1 K/UL (ref 1.8–7.7)
ALBUMIN SERPL-MCNC: 3.7 G/DL (ref 3.5–5.2)
ANION GAP (SMH): 10 MMOL/L (ref 8–16)
BASOPHILS # BLD AUTO: 0.04 K/UL
BASOPHILS NFR BLD AUTO: 0.7 %
BUN SERPL-MCNC: 46 MG/DL (ref 6–20)
CALCIUM SERPL-MCNC: 10.7 MG/DL (ref 8.7–10.5)
CHLORIDE SERPL-SCNC: 98 MMOL/L (ref 95–110)
CO2 SERPL-SCNC: 29 MMOL/L (ref 23–29)
CREAT SERPL-MCNC: 4 MG/DL (ref 0.5–1.4)
CREAT UR-MCNC: 70.3 MG/DL (ref 15–325)
ERYTHROCYTE [DISTWIDTH] IN BLOOD BY AUTOMATED COUNT: 15.2 % (ref 11.5–14.5)
GFR SERPLBLD CREATININE-BSD FMLA CKD-EPI: 15 ML/MIN/1.73/M2
GLUCOSE SERPL-MCNC: 86 MG/DL (ref 70–110)
HCT VFR BLD AUTO: 31.2 % (ref 37–48.5)
HGB BLD-MCNC: 9.8 GM/DL (ref 12–16)
IMM GRANULOCYTES # BLD AUTO: 0.06 K/UL (ref 0–0.04)
IMM GRANULOCYTES NFR BLD AUTO: 1 % (ref 0–0.5)
LYMPHOCYTES # BLD AUTO: 0.85 K/UL (ref 1–4.8)
MAGNESIUM SERPL-MCNC: 1.8 MG/DL (ref 1.6–2.6)
MCH RBC QN AUTO: 29.9 PG (ref 27–31)
MCHC RBC AUTO-ENTMCNC: 31.4 G/DL (ref 32–36)
MCV RBC AUTO: 95 FL (ref 82–98)
NUCLEATED RBC (/100WBC) (SMH): 0 /100 WBC
PHOSPHATE SERPL-MCNC: 4.2 MG/DL (ref 2.7–4.5)
PLATELET # BLD AUTO: 316 K/UL (ref 150–450)
PMV BLD AUTO: 8.2 FL (ref 9.2–12.9)
POTASSIUM SERPL-SCNC: 3.7 MMOL/L (ref 3.5–5.1)
PROT UR-MCNC: 95 MG/DL
PROT/CREAT UR: 1.35 MG/G{CREAT}
RBC # BLD AUTO: 3.28 M/UL (ref 4–5.4)
RELATIVE EOSINOPHIL (SMH): 5.6 % (ref 0–8)
RELATIVE LYMPHOCYTE (SMH): 14.8 % (ref 18–48)
RELATIVE MONOCYTE (SMH): 6.4 % (ref 4–15)
RELATIVE NEUTROPHIL (SMH): 71.5 % (ref 38–73)
SODIUM SERPL-SCNC: 137 MMOL/L (ref 136–145)
WBC # BLD AUTO: 5.76 K/UL (ref 3.9–12.7)

## 2025-05-19 PROCEDURE — 84132 ASSAY OF SERUM POTASSIUM: CPT

## 2025-05-19 PROCEDURE — 36415 COLL VENOUS BLD VENIPUNCTURE: CPT

## 2025-05-19 PROCEDURE — 80197 ASSAY OF TACROLIMUS: CPT

## 2025-05-19 PROCEDURE — 85025 COMPLETE CBC W/AUTO DIFF WBC: CPT

## 2025-05-19 PROCEDURE — 83735 ASSAY OF MAGNESIUM: CPT

## 2025-05-19 PROCEDURE — 84156 ASSAY OF PROTEIN URINE: CPT

## 2025-05-21 ENCOUNTER — RESULTS FOLLOW-UP (OUTPATIENT)
Dept: TRANSPLANT | Facility: CLINIC | Age: 25
End: 2025-05-21

## 2025-05-21 ENCOUNTER — LAB VISIT (OUTPATIENT)
Dept: LAB | Facility: HOSPITAL | Age: 25
End: 2025-05-21
Attending: STUDENT IN AN ORGANIZED HEALTH CARE EDUCATION/TRAINING PROGRAM
Payer: MEDICARE

## 2025-05-21 ENCOUNTER — OFFICE VISIT (OUTPATIENT)
Dept: TRANSPLANT | Facility: CLINIC | Age: 25
End: 2025-05-21
Payer: MEDICARE

## 2025-05-21 VITALS
HEIGHT: 62 IN | SYSTOLIC BLOOD PRESSURE: 117 MMHG | WEIGHT: 137.81 LBS | OXYGEN SATURATION: 99 % | BODY MASS INDEX: 25.36 KG/M2 | HEART RATE: 80 BPM | TEMPERATURE: 98 F | RESPIRATION RATE: 18 BRPM | DIASTOLIC BLOOD PRESSURE: 73 MMHG

## 2025-05-21 DIAGNOSIS — N05.1 FSGS (FOCAL SEGMENTAL GLOMERULOSCLEROSIS): ICD-10-CM

## 2025-05-21 DIAGNOSIS — T86.19 DELAYED GRAFT FUNCTION OF KIDNEY TRANSPLANT DUE TO ATN REQUIRING ACUTE DIALYSIS: ICD-10-CM

## 2025-05-21 DIAGNOSIS — Z94.0 STATUS POST DECEASED-DONOR KIDNEY TRANSPLANTATION: ICD-10-CM

## 2025-05-21 DIAGNOSIS — D84.821 IMMUNOSUPPRESSION DUE TO DRUG THERAPY: ICD-10-CM

## 2025-05-21 DIAGNOSIS — I12.0 BENIGN HYPERTENSION WITH ESRD (END-STAGE RENAL DISEASE): ICD-10-CM

## 2025-05-21 DIAGNOSIS — Z79.899 IMMUNOSUPPRESSION DUE TO DRUG THERAPY: ICD-10-CM

## 2025-05-21 DIAGNOSIS — Z94.0 KIDNEY REPLACED BY TRANSPLANT: ICD-10-CM

## 2025-05-21 DIAGNOSIS — Z94.0 STATUS POST DECEASED-DONOR KIDNEY TRANSPLANTATION: Primary | ICD-10-CM

## 2025-05-21 DIAGNOSIS — N18.6 BENIGN HYPERTENSION WITH ESRD (END-STAGE RENAL DISEASE): ICD-10-CM

## 2025-05-21 DIAGNOSIS — Z99.2 DELAYED GRAFT FUNCTION OF KIDNEY TRANSPLANT DUE TO ATN REQUIRING ACUTE DIALYSIS: ICD-10-CM

## 2025-05-21 LAB
ABSOLUTE EOSINOPHIL (SMH): 0.33 K/UL
ABSOLUTE MONOCYTE (SMH): 0.35 K/UL (ref 0.3–1)
ABSOLUTE NEUTROPHIL COUNT (SMH): 6.1 K/UL (ref 1.8–7.7)
ALBUMIN SERPL-MCNC: 3.8 G/DL (ref 3.5–5.2)
ANION GAP (SMH): 8 MMOL/L (ref 8–16)
BASOPHILS # BLD AUTO: 0.07 K/UL
BASOPHILS NFR BLD AUTO: 0.9 %
BUN SERPL-MCNC: 37 MG/DL (ref 6–20)
CALCIUM SERPL-MCNC: 11.1 MG/DL (ref 8.7–10.5)
CHLORIDE SERPL-SCNC: 104 MMOL/L (ref 95–110)
CO2 SERPL-SCNC: 31 MMOL/L (ref 23–29)
CREAT SERPL-MCNC: 3.1 MG/DL (ref 0.5–1.4)
ERYTHROCYTE [DISTWIDTH] IN BLOOD BY AUTOMATED COUNT: 15.4 % (ref 11.5–14.5)
GFR SERPLBLD CREATININE-BSD FMLA CKD-EPI: 21 ML/MIN/1.73/M2
GLUCOSE SERPL-MCNC: 94 MG/DL (ref 70–110)
HCT VFR BLD AUTO: 32.8 % (ref 37–48.5)
HGB BLD-MCNC: 10 GM/DL (ref 12–16)
IMM GRANULOCYTES # BLD AUTO: 0.05 K/UL (ref 0–0.04)
IMM GRANULOCYTES NFR BLD AUTO: 0.6 % (ref 0–0.5)
LYMPHOCYTES # BLD AUTO: 0.98 K/UL (ref 1–4.8)
MAGNESIUM SERPL-MCNC: 1.8 MG/DL (ref 1.6–2.6)
MCH RBC QN AUTO: 30.2 PG (ref 27–31)
MCHC RBC AUTO-ENTMCNC: 30.5 G/DL (ref 32–36)
MCV RBC AUTO: 99 FL (ref 82–98)
NUCLEATED RBC (/100WBC) (SMH): 0 /100 WBC
PHOSPHATE SERPL-MCNC: 3.7 MG/DL (ref 2.7–4.5)
PLATELET # BLD AUTO: 380 K/UL (ref 150–450)
PMV BLD AUTO: 8.4 FL (ref 9.2–12.9)
POTASSIUM SERPL-SCNC: 3.9 MMOL/L (ref 3.5–5.1)
RBC # BLD AUTO: 3.31 M/UL (ref 4–5.4)
RELATIVE EOSINOPHIL (SMH): 4.2 % (ref 0–8)
RELATIVE LYMPHOCYTE (SMH): 12.5 % (ref 18–48)
RELATIVE MONOCYTE (SMH): 4.5 % (ref 4–15)
RELATIVE NEUTROPHIL (SMH): 77.3 % (ref 38–73)
SODIUM SERPL-SCNC: 143 MMOL/L (ref 136–145)
TACROLIMUS BLD LC/MS/MS-MCNC: 6.4 NG/ML (ref 5–20)
WBC # BLD AUTO: 7.83 K/UL (ref 3.9–12.7)

## 2025-05-21 PROCEDURE — 82040 ASSAY OF SERUM ALBUMIN: CPT

## 2025-05-21 PROCEDURE — 80197 ASSAY OF TACROLIMUS: CPT

## 2025-05-21 PROCEDURE — 99213 OFFICE O/P EST LOW 20 MIN: CPT | Mod: PBBFAC | Performed by: NURSE PRACTITIONER

## 2025-05-21 PROCEDURE — 99999 PR PBB SHADOW E&M-EST. PATIENT-LVL III: CPT | Mod: PBBFAC,,, | Performed by: NURSE PRACTITIONER

## 2025-05-21 PROCEDURE — 36415 COLL VENOUS BLD VENIPUNCTURE: CPT

## 2025-05-21 PROCEDURE — 83735 ASSAY OF MAGNESIUM: CPT

## 2025-05-21 PROCEDURE — 85025 COMPLETE CBC W/AUTO DIFF WBC: CPT

## 2025-05-21 RX ORDER — NIFEDIPINE 30 MG/1
30 TABLET, EXTENDED RELEASE ORAL DAILY
Qty: 30 TABLET | Refills: 11 | Status: SHIPPED | OUTPATIENT
Start: 2025-05-21 | End: 2026-05-21

## 2025-05-21 NOTE — PROGRESS NOTES
"   Kidney Post-Transplant Assessment    Referring Physician: Bryan Wells  Current Nephrologist: Chris Velasquez    ORGAN: LEFT KIDNEY  Donor Type: donation after brain death  PHS Increased Risk: no  Cold Ischemia: 1,511 mins  Induction Medications: thymo    Subjective:     CC:  Reassessment of renal allograft function and management of chronic immunosuppression.    HPI:  Ms. Gabriel is a 24 y.o. year old White female who received a donation after brain death kidney transplant on 5/7/25. Her most recent creatinine is 3.1. She takes mycophenolate mofetil, prednisone, and tacrolimus for maintenance immunosuppression. Her post transplant course has been complicated by delayed graft function requiring dialysis.    Transplant Specifics  ESRD 2/2 re-transplant/FSGS  SCD, KDPI 49%, cPRA 100%  Thymo induction, CIT>24hrs  CMV D+,R- (MISMATCH)     Medical Issues  Kidney #1 2015  Kidney #2 2020  FSGS protocol  cPRA 100%- CXM (-), historical DSA+, Cw7(1586), DQB1*06:04(3071)   Ureteral stent 4 weeks      DGF--Last dialysis 5/19/25  Improvement in Cr 3.1 today. Holding dialysis today with repeat labs on Friday.   Last UPCR 1.3  No book to review today  Reports BP 1teens-130/80, reports best BP since surgery  Reports drinking 2L of fluids with good UOP  No edema  Incision with surgical glue      Review of Systems   Allergic/Immunologic: Positive for immunocompromised state.       Objective:   Blood pressure 117/73, pulse 80, temperature 97.7 °F (36.5 °C), temperature source Temporal, resp. rate 18, height 5' 2" (1.575 m), weight 62.5 kg (137 lb 12.6 oz), SpO2 99%.body mass index is 25.2 kg/m².    Physical Exam  Constitutional:       General: She is not in acute distress.     Appearance: She is well-developed. She is not diaphoretic.   Cardiovascular:      Rate and Rhythm: Normal rate and regular rhythm.      Heart sounds: Normal heart sounds.   Pulmonary:      Effort: Pulmonary effort is normal.      Breath sounds: " "Normal breath sounds.   Abdominal:      General: Bowel sounds are normal.      Palpations: Abdomen is soft.   Musculoskeletal:         General: No tenderness. Normal range of motion.   Skin:     General: Skin is warm and dry.      Findings: No rash.      Nails: There is no clubbing.   Neurological:      Mental Status: She is alert and oriented to person, place, and time.   Psychiatric:         Behavior: Behavior normal.         Labs:  Lab Results   Component Value Date    WBC 7.83 2025    HGB 10.0 (L) 2025    HCT 32.8 (L) 2025     2025    K 3.9 2025     2025    CO2 31 (H) 2025    BUN 37 (H) 2025    CREATININE 3.1 (H) 2025    EGFRNORACEVR 21 (L) 2025    CALCIUM 11.1 (H) 2025    PHOS 3.7 2025    MG 1.8 2025    ALBUMIN 3.8 2025    AST 16 2025    ALT 16 2025    UTPCR 7.09 (H) 05/15/2025    .5 (H) 05/15/2025    TACROLIMUS 8.0 05/15/2025       No results found for: "EXTANC", "EXTWBC", "EXTSEGS", "EXTPLATELETS", "EXTHEMOGLOBI", "EXTHEMATOCRI", "EXTCREATININ", "EXTSODIUM", "EXTPOTASSIUM", "EXTBUN", "EXTCO2", "EXTCALCIUM", "EXTPHOSPHORU", "EXTGLUCOSE", "EXTALBUMIN", "EXTAST", "EXTALT", "EXTBILITOTAL", "EXTLIPASE", "EXTAMYLASE"    No results found for: "EXTCYCLOSLVL", "EXTSIROLIMUS", "EXTTACROLVL", "EXTPROTCRE", "EXTPTHINTACT", "EXTPROTEINUA", "EXTWBCUA", "EXTRBCUA"    Labs were reviewed with the patient    Assessment:     1. Status post -donor kidney transplantation    2. Delayed graft function of kidney transplant due to ATN requiring acute dialysis    3. FSGS (focal segmental glomerulosclerosis)    4. Benign hypertension with ESRD (end-stage renal disease)    5. Immunosuppression due to drug therapy        Plan:   Continue FSGS protocol  Ca++ 11.1---stop TUMS  DGF-- No dialysis today, continue current labs schedule    1. CKD stage: will continue follow up as per our center guidelines. patient to " "continue close follow up with the local General nephrologist. Education provided in appropriate fluid intake, potassium intake. Continue with oral hydration.      2. Immunosuppression: Prograf trough 8, therapeutic target 8-10. Continue Prograf 4/4, [id number] Mg BID, and Prednisone  QD  Lab Results   Component Value Date    TACROLIMUS 8.0 05/15/2025    TACROLIMUS 7.4 05/13/2025    TACROLIMUS 9.7 05/12/2025   Will closely monitor for toxicities, education provided about adherence to medicines and need to communicate any side effect to the transplant nurse or physician.    3. Allograft Function:stable at baseline for the patient. Continue follow up as per our guidelines and with the local General nephrologist. Communication will be sent today.  Lab Results   Component Value Date    CREATININE 3.1 (H) 05/21/2025    CREATININE 4.0 (H) 05/19/2025    CREATININE 3.5 (H) 05/15/2025     No results found for: "AMYLASE", "LIPASE"    4. Hypertension management:  Continue with home blood pressure monitoring, low salt and healthy life discussed with the patient.  BP Readings from Last 3 Encounters:   05/21/25 117/73   05/15/25 126/84   05/15/25 126/84   MTP 100mg Daily, nifedipine 30mg daily    5. Metabolic Bone Disease/Secondary Hyperparathyroidism:calcium and phosphorus level discussed with the patient, patient will continue follow up with the general nephrologist for management of metabolic bone disease calcium and phosphorus as per our center protocol. Will monitor PTH, Vit D level, calcium.   Lab Results   Component Value Date    .5 (H) 05/15/2025    CALCIUM 11.1 (H) 05/21/2025    PHOS 3.7 05/21/2025    PHOS 4.2 05/19/2025    PHOS 4.7 (H) 05/15/2025       6. Electrolytes: reviewed with the patient, essentially within the normal range no need for acute changes today, will monitor as per our center guidelines.  Lab Results   Component Value Date     05/21/2025    K 3.9 05/21/2025     05/21/2025    CO2 31 (H) " "05/21/2025    CO2 29 05/19/2025    CO2 33 (H) 05/15/2025       7. Anemia: will continue monitoring as per our center guidelines. No indication for acute intervention today.  Lab Results   Component Value Date    WBC 7.83 05/21/2025    HGB 10.0 (L) 05/21/2025    HCT 32.8 (L) 05/21/2025    MCV 99 (H) 05/21/2025     05/21/2025       8.Proteinuria: will continue with pr/cr ratio as per our center guidelines  Lab Results   Component Value Date    PROTEINURINE 95 (H) 05/19/2025    CREATRANDUR 70.3 05/19/2025    UTPCR 7.09 (H) 05/15/2025    UTPCR 2.08 (H) 05/10/2025    UTPCR 3.09 (H) 05/09/2025      Upcr 5/19/25  1.35 High    Improving, will continue FSGS protocol     9. BK virus infection screening: will continue with urine or blood PCR as per our guidelines to prevent BK virus viremia and allograft dysfunction  No results found for: "BKVIRUSDNAUR", "BKQUANTURINE", "BKVIRUSLOG", "BKVIRUSURINE"      10. Weight education: provided during the clinic visit.  Body mass index is 25.2 kg/m².     11.Patient safety education regarding immunosuppression including prophylaxis posttransplant for CMV, PCP : Education provided about vaccination and prevention of infections.    12.  Cytopenias: no significant cytopenias will monitor as per our guidelines. Medicine list reviewed including potential causes of drug-induced cytopenias  Lab Results   Component Value Date    WBC 7.83 05/21/2025    HGB 10.0 (L) 05/21/2025    HCT 32.8 (L) 05/21/2025    MCV 99 (H) 05/21/2025     05/21/2025       13. Post-transplant Prophylaxis; CMV Infection, PJP and Candida mucosistis and other indicated for this particular patient.   Bactrim and Valcyte      Exercise: reminded Ricardo of the importance of regular exercise for weight management, blood sugar and blood pressure management.  I also explained exercise has been shown to improve cardiovascular health, energy level, and sleep hygiene.  Lastly, I advised him that cardiovascular " complications are leading cause of death for renal transplant recipients, and regular exercise can help lower this risk.    Follow-up:   Clinic: return to transplant clinic weekly for the first month after transplant; every 2 weeks during months 2-3; then at 6-, 9-, 12-, 18-, 24-, and 36- months post-transplant to reassess for complications from immunosuppression toxicity and monitor for rejection.  Annually thereafter.    Labs: since patient remains at high risk for rejection and drug-related complications that warrant close monitoring, labs will be ordered as follows: continue twice weekly CBC, renal panel, and drug level for first month; then same labs once weekly through 3rd month post-transplant.  Urine for UA and protein/creatinine ratio monthly.  Serum BK - PCR at 1-, 3-, 6-, 9-, 12-, 18-, 24-, 36-, 48-, and 60 months post-transplant.  Hepatic panel at 1-, 2-, 3-, 6-, 9-, 12-, 18-, 24-, and 36- months post-transplant.    Hawa Braun NP       Education:   Material provided to the patient.  Patient reminded to call with any health changes since these can be early signs of significant complications.  Also, I advised the patient to be sure any new medications or changes of old medications are discussed with either a pharmacist or physician knowledgeable with transplant to avoid rejection/drug toxicity related to significant drug interactions.    Patient advised that it is recommended that all transplanted patients, and their close contacts and household members receive Covid vaccination.

## 2025-05-21 NOTE — LETTER
May 21, 2025        Chris Velasquez  [address] [place] [address]  [place] [address]  Phone: [phone]  Fax: [phone]             [facility] Buchanan- Trans[facility]  [address]  [facility] [address]  Phone: [phone]   Patient: Anna Gabriel   MR Number: [id number]   YOB: 2000   Date of Visit: 5/21/2025       Dear Dr. Chris Velasquez    Thank you for referring Anna Gabriel to me for evaluation. Attached you will find relevant portions of my assessment and plan of care.    If you have questions, please do not hesitate to call me. I look forward to following Anna Gabriel along with you.    Sincerely,    Hawa Braun NP    Enclosure    If you would like to receive this communication electronically, please contact [email or web address] or [phone] to request [employer] access.    [employer] is a tool which provides read-only access to select patient information with whom you have a relationship. Its easy to use and provides real time access to review your patients record including encounter summaries, notes, results, and demographic information.    If you feel you have received this communication in error or would no longer like to receive these types of communications, please e-mail [email or web address]

## 2025-05-21 NOTE — LETTER
May 21, 2025      [facility]- Transplant 1st Fl  [address]  [facility] [address]  Phone: [phone]       Patient: Anna Gabriel   YOB: 2000  Date of Visit: 05/21/2025    To Whom It May Concern:    Aidee Gabriel  is under ongoing the medical of care here are [facility]. Patient to remain unable to work currently. She will be undergoing ongoing medical care, labs, procedures for now.  If you have any questions or concerns, or if I can be of further assistance, please do not hesitate to contact me.    Sincerely,      Hawa Braun NP

## 2025-05-22 LAB — TACROLIMUS BLD-MCNC: 12.2 NG/ML (ref 5–15)

## 2025-05-22 RX ORDER — TACROLIMUS 1 MG/1
3 CAPSULE ORAL EVERY 12 HOURS
Qty: 240 CAPSULE | Refills: 11 | Status: SHIPPED | OUTPATIENT
Start: 2025-05-22

## 2025-05-22 NOTE — TELEPHONE ENCOUNTER
Message sent. ----- Message from Colton Dos Santos MD sent at 5/22/2025  8:26 AM CDT -----  If true tac trough, decrease dose from 4/4 to 3/3 and repeat with next labs  ----- Message -----  From: Lab, Background User  Sent: 5/21/2025   8:38 AM CDT  To: Colton Dos Santos Jr., MD

## 2025-05-23 ENCOUNTER — TELEPHONE (OUTPATIENT)
Dept: TRANSPLANT | Facility: CLINIC | Age: 25
End: 2025-05-23
Payer: MEDICARE

## 2025-05-23 ENCOUNTER — LAB VISIT (OUTPATIENT)
Dept: LAB | Facility: HOSPITAL | Age: 25
End: 2025-05-23
Attending: STUDENT IN AN ORGANIZED HEALTH CARE EDUCATION/TRAINING PROGRAM
Payer: MEDICARE

## 2025-05-23 DIAGNOSIS — Z94.0 KIDNEY REPLACED BY TRANSPLANT: ICD-10-CM

## 2025-05-23 LAB
ABSOLUTE EOSINOPHIL (SMH): 0.36 K/UL
ABSOLUTE MONOCYTE (SMH): 0.23 K/UL (ref 0.3–1)
ABSOLUTE NEUTROPHIL COUNT (SMH): 4.7 K/UL (ref 1.8–7.7)
ALBUMIN SERPL-MCNC: 4 G/DL (ref 3.5–5.2)
ANION GAP (SMH): 9 MMOL/L (ref 8–16)
BASOPHILS # BLD AUTO: 0.07 K/UL
BASOPHILS NFR BLD AUTO: 1.1 %
BUN SERPL-MCNC: 46 MG/DL (ref 6–20)
CALCIUM SERPL-MCNC: 11.1 MG/DL (ref 8.7–10.5)
CHLORIDE SERPL-SCNC: 107 MMOL/L (ref 95–110)
CO2 SERPL-SCNC: 26 MMOL/L (ref 23–29)
CREAT SERPL-MCNC: 2.7 MG/DL (ref 0.5–1.4)
ERYTHROCYTE [DISTWIDTH] IN BLOOD BY AUTOMATED COUNT: 15.3 % (ref 11.5–14.5)
GFR SERPLBLD CREATININE-BSD FMLA CKD-EPI: 25 ML/MIN/1.73/M2
GLUCOSE SERPL-MCNC: 96 MG/DL (ref 70–110)
HCT VFR BLD AUTO: 31 % (ref 37–48.5)
HGB BLD-MCNC: 9.7 GM/DL (ref 12–16)
IMM GRANULOCYTES # BLD AUTO: 0.03 K/UL (ref 0–0.04)
IMM GRANULOCYTES NFR BLD AUTO: 0.5 % (ref 0–0.5)
LYMPHOCYTES # BLD AUTO: 1.08 K/UL (ref 1–4.8)
MAGNESIUM SERPL-MCNC: 1.6 MG/DL (ref 1.6–2.6)
MCH RBC QN AUTO: 30.4 PG (ref 27–31)
MCHC RBC AUTO-ENTMCNC: 31.3 G/DL (ref 32–36)
MCV RBC AUTO: 97 FL (ref 82–98)
NUCLEATED RBC (/100WBC) (SMH): 0 /100 WBC
PHOSPHATE SERPL-MCNC: 3.6 MG/DL (ref 2.7–4.5)
PLATELET # BLD AUTO: 376 K/UL (ref 150–450)
PMV BLD AUTO: 8.5 FL (ref 9.2–12.9)
POTASSIUM SERPL-SCNC: 4 MMOL/L (ref 3.5–5.1)
RBC # BLD AUTO: 3.19 M/UL (ref 4–5.4)
RELATIVE EOSINOPHIL (SMH): 5.6 % (ref 0–8)
RELATIVE LYMPHOCYTE (SMH): 16.7 % (ref 18–48)
RELATIVE MONOCYTE (SMH): 3.6 % (ref 4–15)
RELATIVE NEUTROPHIL (SMH): 72.5 % (ref 38–73)
SODIUM SERPL-SCNC: 142 MMOL/L (ref 136–145)
WBC # BLD AUTO: 6.47 K/UL (ref 3.9–12.7)

## 2025-05-23 PROCEDURE — 85025 COMPLETE CBC W/AUTO DIFF WBC: CPT

## 2025-05-23 PROCEDURE — 80197 ASSAY OF TACROLIMUS: CPT

## 2025-05-23 PROCEDURE — 36415 COLL VENOUS BLD VENIPUNCTURE: CPT

## 2025-05-23 PROCEDURE — 83735 ASSAY OF MAGNESIUM: CPT

## 2025-05-23 PROCEDURE — 80069 RENAL FUNCTION PANEL: CPT

## 2025-05-23 RX ORDER — CINACALCET 30 MG/1
30 TABLET, FILM COATED ORAL DAILY
Qty: 30 TABLET | Refills: 11 | Status: SHIPPED | OUTPATIENT
Start: 2025-05-23 | End: 2026-05-23

## 2025-05-23 NOTE — TELEPHONE ENCOUNTER
Message sent.     ----- Message from Colton Dos Santos MD sent at 5/23/2025 11:29 AM CDT -----  No dialysis. Start cinacalcet 30 daily for hypercalcemia. Other labs acceptable. Tac pending  ----- Message -----  From: Lab, Background User  Sent: 5/23/2025   8:27 AM CDT  To: Colton Dos Santos Jr., MD

## 2025-05-23 NOTE — TELEPHONE ENCOUNTER
NO HD Today. Labs Monday with poss cancel of HD chair----- Message from May sent at 5/23/2025 10:03 AM CDT -----  Regarding: Urgent Consult/Advisory  Contact: [phone]  Consult/Advisory Name Of Caller: Pt   Contact Preference:  [phone] Nature of call: Would like to know if lab results are in, would like to know if she should go to dialysis. Please call to advise thank you

## 2025-05-26 ENCOUNTER — RESULTS FOLLOW-UP (OUTPATIENT)
Dept: TRANSPLANT | Facility: HOSPITAL | Age: 25
End: 2025-05-26

## 2025-05-26 ENCOUNTER — OFFICE VISIT (OUTPATIENT)
Dept: TRANSPLANT | Facility: CLINIC | Age: 25
End: 2025-05-26
Payer: MEDICARE

## 2025-05-26 ENCOUNTER — LAB VISIT (OUTPATIENT)
Dept: LAB | Facility: HOSPITAL | Age: 25
End: 2025-05-26
Attending: STUDENT IN AN ORGANIZED HEALTH CARE EDUCATION/TRAINING PROGRAM
Payer: MEDICARE

## 2025-05-26 VITALS
BODY MASS INDEX: 24.51 KG/M2 | HEIGHT: 62 IN | TEMPERATURE: 97 F | DIASTOLIC BLOOD PRESSURE: 75 MMHG | HEART RATE: 78 BPM | OXYGEN SATURATION: 100 % | WEIGHT: 133.19 LBS | SYSTOLIC BLOOD PRESSURE: 110 MMHG

## 2025-05-26 DIAGNOSIS — Z99.2 DELAYED GRAFT FUNCTION OF KIDNEY TRANSPLANT DUE TO ATN REQUIRING ACUTE DIALYSIS: ICD-10-CM

## 2025-05-26 DIAGNOSIS — Z94.0 KIDNEY REPLACED BY TRANSPLANT: ICD-10-CM

## 2025-05-26 DIAGNOSIS — D63.1 ANEMIA IN CHRONIC KIDNEY DISEASE, ON CHRONIC DIALYSIS: Primary | ICD-10-CM

## 2025-05-26 DIAGNOSIS — N05.1 FSGS (FOCAL SEGMENTAL GLOMERULOSCLEROSIS): ICD-10-CM

## 2025-05-26 DIAGNOSIS — T86.19 DELAYED GRAFT FUNCTION OF KIDNEY TRANSPLANT DUE TO ATN REQUIRING ACUTE DIALYSIS: ICD-10-CM

## 2025-05-26 DIAGNOSIS — Z91.89 AT RISK FOR OPPORTUNISTIC INFECTIONS: ICD-10-CM

## 2025-05-26 DIAGNOSIS — Z79.60 LONG-TERM USE OF IMMUNOSUPPRESSANT MEDICATION: ICD-10-CM

## 2025-05-26 DIAGNOSIS — N18.6 ANEMIA IN CHRONIC KIDNEY DISEASE, ON CHRONIC DIALYSIS: Primary | ICD-10-CM

## 2025-05-26 DIAGNOSIS — Z99.2 ANEMIA IN CHRONIC KIDNEY DISEASE, ON CHRONIC DIALYSIS: Primary | ICD-10-CM

## 2025-05-26 DIAGNOSIS — Z94.0 STATUS POST DECEASED-DONOR KIDNEY TRANSPLANTATION: Primary | ICD-10-CM

## 2025-05-26 DIAGNOSIS — I15.0 RENOVASCULAR HYPERTENSION: ICD-10-CM

## 2025-05-26 LAB
ABSOLUTE EOSINOPHIL (SMH): 0.29 K/UL
ABSOLUTE MONOCYTE (SMH): 0.25 K/UL (ref 0.3–1)
ABSOLUTE NEUTROPHIL COUNT (SMH): 3.7 K/UL (ref 1.8–7.7)
ALBUMIN SERPL-MCNC: 4.2 G/DL (ref 3.5–5.2)
ANION GAP (SMH): 10 MMOL/L (ref 8–16)
BACTERIA #/AREA URNS AUTO: ABNORMAL /HPF
BASOPHILS # BLD AUTO: 0.06 K/UL
BASOPHILS NFR BLD AUTO: 1.1 %
BILIRUB UR QL STRIP.AUTO: NEGATIVE
BUN SERPL-MCNC: 55 MG/DL (ref 6–20)
CALCIUM SERPL-MCNC: 10.1 MG/DL (ref 8.7–10.5)
CHLORIDE SERPL-SCNC: 109 MMOL/L (ref 95–110)
CLARITY UR: CLEAR
CO2 SERPL-SCNC: 24 MMOL/L (ref 23–29)
COLOR UR AUTO: YELLOW
CREAT SERPL-MCNC: 2.2 MG/DL (ref 0.5–1.4)
CREAT UR-MCNC: 75.7 MG/DL (ref 15–325)
ERYTHROCYTE [DISTWIDTH] IN BLOOD BY AUTOMATED COUNT: 15.4 % (ref 11.5–14.5)
GFR SERPLBLD CREATININE-BSD FMLA CKD-EPI: 31 ML/MIN/1.73/M2
GLUCOSE SERPL-MCNC: 96 MG/DL (ref 70–110)
GLUCOSE UR QL STRIP: NEGATIVE
HCT VFR BLD AUTO: 30.8 % (ref 37–48.5)
HGB BLD-MCNC: 9.6 GM/DL (ref 12–16)
HGB UR QL STRIP: ABNORMAL
IMM GRANULOCYTES # BLD AUTO: 0.02 K/UL (ref 0–0.04)
IMM GRANULOCYTES NFR BLD AUTO: 0.4 % (ref 0–0.5)
KETONES UR QL STRIP: NEGATIVE
LEUKOCYTE ESTERASE UR QL STRIP: ABNORMAL
LYMPHOCYTES # BLD AUTO: 1.05 K/UL (ref 1–4.8)
MAGNESIUM SERPL-MCNC: 1.5 MG/DL (ref 1.6–2.6)
MCH RBC QN AUTO: 30.1 PG (ref 27–31)
MCHC RBC AUTO-ENTMCNC: 31.2 G/DL (ref 32–36)
MCV RBC AUTO: 97 FL (ref 82–98)
MICROSCOPIC COMMENT: ABNORMAL
NITRITE UR QL STRIP: NEGATIVE
NUCLEATED RBC (/100WBC) (SMH): 0 /100 WBC
PH UR STRIP: 5 [PH]
PHOSPHATE SERPL-MCNC: 3.5 MG/DL (ref 2.7–4.5)
PLATELET # BLD AUTO: 328 K/UL (ref 150–450)
PMV BLD AUTO: 8.4 FL (ref 9.2–12.9)
POTASSIUM SERPL-SCNC: 4 MMOL/L (ref 3.5–5.1)
PROT UR QL STRIP: ABNORMAL
PROT UR-MCNC: 39 MG/DL
PROT/CREAT UR: 0.52 MG/G{CREAT}
RBC # BLD AUTO: 3.19 M/UL (ref 4–5.4)
RBC #/AREA URNS AUTO: 16 /HPF
RELATIVE EOSINOPHIL (SMH): 5.4 % (ref 0–8)
RELATIVE LYMPHOCYTE (SMH): 19.6 % (ref 18–48)
RELATIVE MONOCYTE (SMH): 4.7 % (ref 4–15)
RELATIVE NEUTROPHIL (SMH): 68.8 % (ref 38–73)
SODIUM SERPL-SCNC: 143 MMOL/L (ref 136–145)
SP GR UR STRIP: 1.01
SQUAMOUS #/AREA URNS AUTO: 5 /HPF
UROBILINOGEN UR STRIP-ACNC: NEGATIVE EU/DL
WBC # BLD AUTO: 5.35 K/UL (ref 3.9–12.7)
WBC #/AREA URNS AUTO: 18 /HPF

## 2025-05-26 PROCEDURE — 85025 COMPLETE CBC W/AUTO DIFF WBC: CPT

## 2025-05-26 PROCEDURE — 99999 PR PBB SHADOW E&M-EST. PATIENT-LVL IV: CPT | Mod: PBBFAC,,, | Performed by: NURSE PRACTITIONER

## 2025-05-26 PROCEDURE — 84100 ASSAY OF PHOSPHORUS: CPT

## 2025-05-26 PROCEDURE — 99214 OFFICE O/P EST MOD 30 MIN: CPT | Mod: PBBFAC | Performed by: NURSE PRACTITIONER

## 2025-05-26 PROCEDURE — 80197 ASSAY OF TACROLIMUS: CPT

## 2025-05-26 PROCEDURE — 81003 URINALYSIS AUTO W/O SCOPE: CPT

## 2025-05-26 PROCEDURE — 99215 OFFICE O/P EST HI 40 MIN: CPT | Mod: S$PBB,,, | Performed by: NURSE PRACTITIONER

## 2025-05-26 PROCEDURE — 36415 COLL VENOUS BLD VENIPUNCTURE: CPT

## 2025-05-26 PROCEDURE — 83735 ASSAY OF MAGNESIUM: CPT

## 2025-05-26 PROCEDURE — 84156 ASSAY OF PROTEIN URINE: CPT

## 2025-05-26 NOTE — LETTER
May 26, 2025        [facility] [place] [address] [address] [place] [address]  Phone: [phone]  Fax: [phone]             [facility]- Transplant 1st Fl  [address]  [facility] [address]  Phone: [phone]   Patient: Anna Gabriel   MR Number: [id number]   YOB: 2000   Date of Visit: 5/26/2025       Dear Dr. Chris Velasquez    Thank you for referring Anna Gabriel to me for evaluation. Attached you will find relevant portions of my assessment and plan of care.    If you have questions, please do not hesitate to call me. I look forward to following Anna Gabriel along with you.    Sincerely,    Suha Alonzo, CORTEZ    Enclosure    If you would like to receive this communication electronically, please contact [email or web address] or [phone] to request [employer] access.    [employer] is a tool which provides read-only access to select patient information with whom you have a relationship. Its easy to use and provides real time access to review your patients record including encounter summaries, notes, results, and demographic information.    If you feel you have received this communication in error or would no longer like to receive these types of communications, please e-mail [email or web address]

## 2025-05-26 NOTE — PROGRESS NOTES
Kidney Post-Transplant Assessment    Referring Physician: Bryan Wells  Current Nephrologist: Chris Velasquez    ORGAN: LEFT KIDNEY  Donor Type: donation after brain death  PHS Increased Risk: no  Cold Ischemia: 1,511 mins  Induction Medications: thymo    Subjective:     CC:  Reassessment of renal allograft function and management of chronic immunosuppression.    HPI:  Ms. Gabriel is a 24 y.o. year old White female with history of ESRD secondary to FSGS/urinary infections/rejections who received a donation after brain death kidney transplant #3 on 5/7/25 ( KDPI 49%, cPRA 100%. Thymo induction, CMV D+,R- (MISMATCH). CIT>24hrs). Pt had Txp #1 in 2015 and #2 in 2020. Her most recent creatinine is 2.2. She takes mycophenolate mofetil, prednisone, and tacrolimus for maintenance immunosuppression. Her post transplant course has been complicated by delayed graft function requiring dialysis.    Now 19 days post transplant and doing well. She has been holding dialysis and pleased that creatinine continues to trend down. Reports no problems with urination. Energy levels and appetite good. BP at goal, 110/75 in clinic. Surgical incision with dermabond, no redness or swelling. Pain has been well managed. Tolerating IS without issue, no diarrhea or vomiting.       Current Medications[1]    Past Medical History:   Diagnosis Date    Anemia     Encounter for blood transfusion     FSGS (focal segmental glomerulosclerosis)     Hypertension     Hyperthyroidism     Kidney transplant status     Kidney Rejection transplant kidney       Review of Systems   Constitutional:  Negative for activity change and fever.   Eyes:  Negative for visual disturbance.   Respiratory:  Negative for shortness of breath.    Cardiovascular:  Negative for chest pain and leg swelling.   Gastrointestinal:  Negative for constipation, diarrhea and nausea.   Genitourinary:  Negative for difficulty urinating, frequency and hematuria.   Musculoskeletal:  " Negative for arthralgias and myalgias.   Skin:  Positive for wound.   Allergic/Immunologic: Positive for immunocompromised state.   Neurological:  Negative for weakness and numbness.   Psychiatric/Behavioral:  Negative for sleep disturbance. The patient is not nervous/anxious.      Objective:   Blood pressure 110/75, pulse 78, temperature 97.3 °F (36.3 °C), temperature source Temporal, height 5' 2" (1.575 m), weight 60.4 kg (133 lb 2.5 oz), SpO2 100%.body mass index is 24.35 kg/m².    Physical Exam  Vitals and nursing note reviewed.   Constitutional:       Appearance: Normal appearance.   Cardiovascular:      Rate and Rhythm: Normal rate and regular rhythm.      Heart sounds: Normal heart sounds.      Comments: permacath  Pulmonary:      Effort: Pulmonary effort is normal.      Breath sounds: Normal breath sounds.   Abdominal:      General: There is no distension.      Comments: Midline abd incision well approximated with dermabond, no redness or swelling    Musculoskeletal:         General: Normal range of motion.   Skin:     General: Skin is warm and dry.   Neurological:      Mental Status: She is alert.         Labs:  Lab Results   Component Value Date    WBC 5.35 2025    HGB 9.6 (L) 2025    HCT 30.8 (L) 2025     2025    K 4.0 2025     2025    CO2 24 2025    BUN 55 (H) 2025    CREATININE 2.2 (H) 2025    EGFRNORACEVR 31 (L) 2025    CALCIUM 10.1 2025    PHOS 3.5 2025    MG 1.5 (L) 2025    ALBUMIN 4.2 2025    AST 16 2025    ALT 16 2025    UTPCR 7.09 (H) 05/15/2025    .5 (H) 05/15/2025    TACROLIMUS 12.2 2025     Labs were reviewed with the patient    Assessment:     1. Status post -donor kidney transplantation #3 2025    2. FSGS (focal segmental glomerulosclerosis)    3. Delayed graft function of kidney transplant due to ATN requiring acute dialysis    4. Long-term use of " immunosuppressant medication    5. Renovascular hypertension    6. At risk for opportunistic infections      Plan:   Prograf level pending  Planning to restart ASA tomorrow (5/27)  Continue DGF assessment and labs, will need permacath removed once dialysis chair cancelled  Ureteral stent removal 6/5 (4 weeks due to small bladder)  Requests Monday visits in the future if possible        1. Immunosuppression: Prograf trough PENDING. Continue Prograf 3/3, MMF 1000 mg BID, and Prednisone taper. Will continue to monitor for drug toxicities    2. Allograft Function: DGF, have been holding HD and creatinine continues to trend down   - ESRD secondary to FSGS/urinary infections/rejections s/p  donation after brain death kidney transplant #3 on 5/7/25 ( KDPI 49%, cPRA 100%. Thymo induction, CMV D+,R- (MISMATCH). CIT>24hrs).    - Pt had Txp #1 in 2015 and #2 in 2020.   - post transplant course has been complicated by delayed graft function requiring dialysis.  Lab Results   Component Value Date    CREATININE 2.2 (H) 05/26/2025       3. Hypertension management:   - advise low salt diet and home BP monitoring    - nifedipine 30 mg QD    4. Metabolic Bone Disease/Secondary Hyperparathyroidism: sensipar 30 mg daily  Lab Results   Component Value Date    .5 (H) 05/15/2025    CALCIUM 10.1 05/26/2025    PHOS 3.5 05/26/2025       5. Electrolytes: stable. No need for intervention   Lab Results   Component Value Date     05/26/2025    K 4.0 05/26/2025     05/26/2025    CO2 24 05/26/2025       6. Anemia: epo weekly x 4 doses, iron panel next lab  Lab Results   Component Value Date    WBC 5.35 05/26/2025    HGB 9.6 (L) 05/26/2025    HCT 30.8 (L) 05/26/2025    MCV 97 05/26/2025     05/26/2025         7. Proteinuria: continue p/c ratio as per FSGS guidelines    - last UPC 0.52    8. BK virus infection screening:  BK PCR per protocol     9. Weight education: provided during the clinic visit   Body mass index is  24.35 kg/m².     10. Patient safety education regarding immunosuppression including prophylaxis posttransplant for CMV, PCP  - PCP prophylaxis: bactrim (stop 11/4/2025)  - CMV prophylaxis: valcyte (stop 11/4/2025)           Follow-up:   Clinic: return to transplant clinic weekly for the first month after transplant; every 2 weeks during months 2-3; then at 6-, 9-, 12-, 18-, 24-, and 36- months post-transplant to reassess for complications from immunosuppression toxicity and monitor for rejection.  Annually thereafter.    Labs: since patient remains at high risk for rejection and drug-related complications that warrant close monitoring, labs will be ordered as follows: continue twice weekly CBC, renal panel, and drug level for first month; then same labs once weekly through 3rd month post-transplant.  Urine for UA and protein/creatinine ratio monthly.  Serum BK - PCR at 1-, 3-, 6-, 9-, 12-, 18-, 24-, 36-, 48-, and 60 months post-transplant.  Hepatic panel at 1-, 2-, 3-, 6-, 9-, 12-, 18-, 24-, and 36- months post-transplant.    Suha Alonzo NP            [1]   Current Outpatient Medications   Medication Sig Dispense Refill    cinacalcet (SENSIPAR) 30 MG Tab Take 1 tablet (30 mg total) by mouth once daily. 30 tablet 11    epoetin alfred-epbx (RETACRIT) 10,000 unit/mL imjection Inject 1 mL (10,000 Units total) into the skin every 7 days. for 3 doses 3 mL 0    famotidine (PEPCID) 20 MG tablet Take 1 tablet (20 mg total) by mouth every evening. 30 tablet 0    heparin sodium,porcine (HEPARIN, PORCINE,) 1,000 unit/mL injection 5,000 Units by Intra-Catheter route as needed. use as directed      metoprolol succinate (TOPROL-XL) 100 MG 24 hr tablet Take 1 tablet (100 mg total) by mouth once daily. 90 tablet 3    multivitamin Tab Take 1 tablet by mouth once daily.      mycophenolate (CELLCEPT) 250 mg Cap Take 4 capsules (1,000 mg total) by mouth 2 (two) times daily. 240 capsule 11    NIFEdipine (PROCARDIA-XL) 30 MG (OSM) 24  hr tablet Take 1 tablet (30 mg total) by mouth once daily. Hold for BP >130 30 tablet 11    oxybutynin (DITROPAN) 5 MG Tab Take 1 tablet (5 mg total) by mouth 3 (three) times daily. 90 tablet 1    oxyCODONE (ROXICODONE) 10 mg Tab immediate release tablet Take 1/2-1 tablet (5-10 mg total) by mouth every 4 (four) hours as needed for Pain. 40 tablet 0    predniSONE (DELTASONE) 5 MG tablet Take by mouth daily: 5/9 to 5/15 20 mg, 5/16 to 5/22 15 mg, 5/23 to 5/29 10 mg, 5/30/25 start 5 mg oral daily, DO NOT DISCONTINUE 70 tablet 11    sulfamethoxazole-trimethoprim 400-80mg (BACTRIM,SEPTRA) 400-80 mg per tablet Take 1 tablet by mouth every Mon, Wed, Fri. Stop 11/4/2025 12 tablet 5    tacrolimus (PROGRAF) 1 MG Cap Take 3 capsules (3 mg total) by mouth every 12 (twelve) hours. 240 capsule 11    torsemide (DEMADEX) 100 MG Tab Take 1 tablet (100 mg total) by mouth once daily. 30 tablet 0    valGANciclovir (VALCYTE) 450 mg Tab Take 1 tablet (450 mg total) by mouth every Mon, Wed, Fri. Stop 11/4/2025 60 tablet 5    [Paused] aspirin 81 MG Chew Take 81 mg by mouth once daily. (Patient not taking: Reported on 5/26/2025)       No current facility-administered medications for this visit.

## 2025-05-27 LAB — TACROLIMUS BLD LC/MS/MS-MCNC: 8.3 NG/ML (ref 5–20)

## 2025-05-28 ENCOUNTER — TELEPHONE (OUTPATIENT)
Dept: TRANSPLANT | Facility: CLINIC | Age: 25
End: 2025-05-28
Payer: MEDICARE

## 2025-05-28 LAB — TACROLIMUS BLD LC/MS/MS-MCNC: 6.8 NG/ML (ref 5–20)

## 2025-05-28 NOTE — TELEPHONE ENCOUNTER
Patient reports sitting blood pressure of 92/69 and HR 79. Patient holding nifedipine per hold parameters. Advised to also hold Metoprolol this morning. Patient reports dizziness upon standing.  made aware. Per MD patient to increase water intake today and consume foods high in salt. Patient to notify coordinator if she is unable to complete ADLs due to dizziness/hypotension. She states understanding of this plan.

## 2025-05-29 ENCOUNTER — LAB VISIT (OUTPATIENT)
Dept: LAB | Facility: HOSPITAL | Age: 25
End: 2025-05-29
Attending: STUDENT IN AN ORGANIZED HEALTH CARE EDUCATION/TRAINING PROGRAM
Payer: MEDICARE

## 2025-05-29 ENCOUNTER — RESULTS FOLLOW-UP (OUTPATIENT)
Dept: TRANSPLANT | Facility: HOSPITAL | Age: 25
End: 2025-05-29
Payer: MEDICARE

## 2025-05-29 DIAGNOSIS — Z94.0 KIDNEY REPLACED BY TRANSPLANT: ICD-10-CM

## 2025-05-29 DIAGNOSIS — Z94.0 STATUS POST DECEASED-DONOR KIDNEY TRANSPLANTATION: ICD-10-CM

## 2025-05-29 LAB
ABSOLUTE EOSINOPHIL (SMH): 0.18 K/UL
ABSOLUTE MONOCYTE (SMH): 0.27 K/UL (ref 0.3–1)
ABSOLUTE NEUTROPHIL COUNT (SMH): 3.9 K/UL (ref 1.8–7.7)
ALBUMIN SERPL-MCNC: 4.8 G/DL (ref 3.5–5.2)
ANION GAP (SMH): 15 MMOL/L (ref 8–16)
BASOPHILS # BLD AUTO: 0.04 K/UL
BASOPHILS NFR BLD AUTO: 0.7 %
BUN SERPL-MCNC: 66 MG/DL (ref 6–20)
CALCIUM SERPL-MCNC: 10.4 MG/DL (ref 8.7–10.5)
CHLORIDE SERPL-SCNC: 104 MMOL/L (ref 95–110)
CO2 SERPL-SCNC: 20 MMOL/L (ref 23–29)
CREAT SERPL-MCNC: 2.6 MG/DL (ref 0.5–1.4)
CREAT UR-MCNC: 106.2 MG/DL (ref 15–325)
ERYTHROCYTE [DISTWIDTH] IN BLOOD BY AUTOMATED COUNT: 15.6 % (ref 11.5–14.5)
GFR SERPLBLD CREATININE-BSD FMLA CKD-EPI: 26 ML/MIN/1.73/M2
GLUCOSE SERPL-MCNC: 107 MG/DL (ref 70–110)
HCT VFR BLD AUTO: 35 % (ref 37–48.5)
HGB BLD-MCNC: 11.1 GM/DL (ref 12–16)
IMM GRANULOCYTES # BLD AUTO: 0.03 K/UL (ref 0–0.04)
IMM GRANULOCYTES NFR BLD AUTO: 0.5 % (ref 0–0.5)
LYMPHOCYTES # BLD AUTO: 1.05 K/UL (ref 1–4.8)
MAGNESIUM SERPL-MCNC: 1.6 MG/DL (ref 1.6–2.6)
MCH RBC QN AUTO: 30.1 PG (ref 27–31)
MCHC RBC AUTO-ENTMCNC: 31.7 G/DL (ref 32–36)
MCV RBC AUTO: 95 FL (ref 82–98)
NUCLEATED RBC (/100WBC) (SMH): 0 /100 WBC
PHOSPHATE SERPL-MCNC: 4.2 MG/DL (ref 2.7–4.5)
PLATELET # BLD AUTO: 336 K/UL (ref 150–450)
PMV BLD AUTO: 8.9 FL (ref 9.2–12.9)
POTASSIUM SERPL-SCNC: 3.8 MMOL/L (ref 3.5–5.1)
PROT UR-MCNC: 46 MG/DL
PROT/CREAT UR: 0.43 MG/G{CREAT}
RBC # BLD AUTO: 3.69 M/UL (ref 4–5.4)
RELATIVE EOSINOPHIL (SMH): 3.3 % (ref 0–8)
RELATIVE LYMPHOCYTE (SMH): 19.2 % (ref 18–48)
RELATIVE MONOCYTE (SMH): 4.9 % (ref 4–15)
RELATIVE NEUTROPHIL (SMH): 71.4 % (ref 38–73)
SODIUM SERPL-SCNC: 139 MMOL/L (ref 136–145)
WBC # BLD AUTO: 5.46 K/UL (ref 3.9–12.7)

## 2025-05-29 PROCEDURE — 80069 RENAL FUNCTION PANEL: CPT

## 2025-05-29 PROCEDURE — 83735 ASSAY OF MAGNESIUM: CPT

## 2025-05-29 PROCEDURE — 84156 ASSAY OF PROTEIN URINE: CPT

## 2025-05-29 PROCEDURE — 80197 ASSAY OF TACROLIMUS: CPT

## 2025-05-29 PROCEDURE — 36415 COLL VENOUS BLD VENIPUNCTURE: CPT

## 2025-05-29 PROCEDURE — 85025 COMPLETE CBC W/AUTO DIFF WBC: CPT

## 2025-06-01 LAB — TACROLIMUS BLD LC/MS/MS-MCNC: 12 NG/ML (ref 5–20)

## 2025-06-02 ENCOUNTER — LAB VISIT (OUTPATIENT)
Dept: LAB | Facility: HOSPITAL | Age: 25
End: 2025-06-02
Attending: STUDENT IN AN ORGANIZED HEALTH CARE EDUCATION/TRAINING PROGRAM
Payer: MEDICARE

## 2025-06-02 DIAGNOSIS — Z94.0 KIDNEY REPLACED BY TRANSPLANT: ICD-10-CM

## 2025-06-02 DIAGNOSIS — Z94.0 STATUS POST DECEASED-DONOR KIDNEY TRANSPLANTATION: ICD-10-CM

## 2025-06-02 LAB
ABSOLUTE EOSINOPHIL (SMH): 0.12 K/UL
ABSOLUTE MONOCYTE (SMH): 0.2 K/UL (ref 0.3–1)
ABSOLUTE NEUTROPHIL COUNT (SMH): 2.7 K/UL (ref 1.8–7.7)
ALBUMIN SERPL-MCNC: 4.7 G/DL (ref 3.5–5.2)
ANION GAP (SMH): 12 MMOL/L (ref 8–16)
BASOPHILS # BLD AUTO: 0.05 K/UL
BASOPHILS NFR BLD AUTO: 1.2 %
BUN SERPL-MCNC: 56 MG/DL (ref 6–20)
CALCIUM SERPL-MCNC: 10.1 MG/DL (ref 8.7–10.5)
CHLORIDE SERPL-SCNC: 107 MMOL/L (ref 95–110)
CO2 SERPL-SCNC: 21 MMOL/L (ref 23–29)
CREAT SERPL-MCNC: 2 MG/DL (ref 0.5–1.4)
ERYTHROCYTE [DISTWIDTH] IN BLOOD BY AUTOMATED COUNT: 15.7 % (ref 11.5–14.5)
GFR SERPLBLD CREATININE-BSD FMLA CKD-EPI: 35 ML/MIN/1.73/M2
GLUCOSE SERPL-MCNC: 100 MG/DL (ref 70–110)
HCT VFR BLD AUTO: 32.6 % (ref 37–48.5)
HGB BLD-MCNC: 10.4 GM/DL (ref 12–16)
IMM GRANULOCYTES # BLD AUTO: 0.02 K/UL (ref 0–0.04)
IMM GRANULOCYTES NFR BLD AUTO: 0.5 % (ref 0–0.5)
LYMPHOCYTES # BLD AUTO: 0.99 K/UL (ref 1–4.8)
MAGNESIUM SERPL-MCNC: 1.8 MG/DL (ref 1.6–2.6)
MCH RBC QN AUTO: 30.3 PG (ref 27–31)
MCHC RBC AUTO-ENTMCNC: 31.9 G/DL (ref 32–36)
MCV RBC AUTO: 95 FL (ref 82–98)
NUCLEATED RBC (/100WBC) (SMH): 0 /100 WBC
PHOSPHATE SERPL-MCNC: 4 MG/DL (ref 2.7–4.5)
PLATELET # BLD AUTO: 309 K/UL (ref 150–450)
PMV BLD AUTO: 9.3 FL (ref 9.2–12.9)
POTASSIUM SERPL-SCNC: 4.1 MMOL/L (ref 3.5–5.1)
RBC # BLD AUTO: 3.43 M/UL (ref 4–5.4)
RELATIVE EOSINOPHIL (SMH): 2.9 % (ref 0–8)
RELATIVE LYMPHOCYTE (SMH): 24.1 % (ref 18–48)
RELATIVE MONOCYTE (SMH): 4.9 % (ref 4–15)
RELATIVE NEUTROPHIL (SMH): 66.4 % (ref 38–73)
SODIUM SERPL-SCNC: 140 MMOL/L (ref 136–145)
WBC # BLD AUTO: 4.11 K/UL (ref 3.9–12.7)

## 2025-06-02 PROCEDURE — 80069 RENAL FUNCTION PANEL: CPT

## 2025-06-02 PROCEDURE — 80197 ASSAY OF TACROLIMUS: CPT

## 2025-06-02 PROCEDURE — 85025 COMPLETE CBC W/AUTO DIFF WBC: CPT

## 2025-06-02 PROCEDURE — 36415 COLL VENOUS BLD VENIPUNCTURE: CPT

## 2025-06-02 PROCEDURE — 83735 ASSAY OF MAGNESIUM: CPT

## 2025-06-02 RX ORDER — TACROLIMUS 1 MG/1
1 CAPSULE ORAL EVERY 12 HOURS
Qty: 240 CAPSULE | Refills: 11 | Status: SHIPPED | OUTPATIENT
Start: 2025-06-02 | End: 2025-06-04 | Stop reason: SDUPTHER

## 2025-06-02 RX ORDER — TACROLIMUS 0.5 MG/1
0.5 CAPSULE ORAL EVERY 12 HOURS
Qty: 60 CAPSULE | Refills: 11 | Status: SHIPPED | OUTPATIENT
Start: 2025-06-02 | End: 2025-06-02 | Stop reason: SDUPTHER

## 2025-06-02 RX ORDER — TACROLIMUS 0.5 MG/1
0.5 CAPSULE ORAL EVERY 12 HOURS
Qty: 60 CAPSULE | Refills: 11 | Status: SHIPPED | OUTPATIENT
Start: 2025-06-02 | End: 2025-06-04 | Stop reason: SDUPTHER

## 2025-06-03 LAB — TACROLIMUS BLD-MCNC: 8.7 NG/ML (ref 5–15)

## 2025-06-04 ENCOUNTER — RESULTS FOLLOW-UP (OUTPATIENT)
Dept: TRANSPLANT | Facility: CLINIC | Age: 25
End: 2025-06-04

## 2025-06-04 ENCOUNTER — TELEPHONE (OUTPATIENT)
Dept: TRANSPLANT | Facility: CLINIC | Age: 25
End: 2025-06-04
Payer: MEDICARE

## 2025-06-04 ENCOUNTER — OFFICE VISIT (OUTPATIENT)
Dept: TRANSPLANT | Facility: CLINIC | Age: 25
End: 2025-06-04
Payer: MEDICARE

## 2025-06-04 ENCOUNTER — TELEPHONE (OUTPATIENT)
Dept: UROLOGY | Facility: CLINIC | Age: 25
End: 2025-06-04
Payer: MEDICARE

## 2025-06-04 VITALS
DIASTOLIC BLOOD PRESSURE: 67 MMHG | BODY MASS INDEX: 24.01 KG/M2 | TEMPERATURE: 98 F | RESPIRATION RATE: 18 BRPM | WEIGHT: 130.5 LBS | HEART RATE: 74 BPM | TEMPERATURE: 98 F | OXYGEN SATURATION: 97 % | BODY MASS INDEX: 24.01 KG/M2 | HEIGHT: 62 IN | HEART RATE: 74 BPM | WEIGHT: 130.5 LBS | DIASTOLIC BLOOD PRESSURE: 67 MMHG | SYSTOLIC BLOOD PRESSURE: 100 MMHG | SYSTOLIC BLOOD PRESSURE: 100 MMHG | OXYGEN SATURATION: 97 % | HEIGHT: 62 IN | RESPIRATION RATE: 18 BRPM

## 2025-06-04 DIAGNOSIS — Z99.2 DELAYED GRAFT FUNCTION OF KIDNEY TRANSPLANT DUE TO ATN REQUIRING ACUTE DIALYSIS: ICD-10-CM

## 2025-06-04 DIAGNOSIS — Z51.81 ENCOUNTER FOR THERAPEUTIC DRUG MONITORING: ICD-10-CM

## 2025-06-04 DIAGNOSIS — T86.19 RETAINED URETERAL STENT OF TRANSPLANTED KIDNEY: Primary | ICD-10-CM

## 2025-06-04 DIAGNOSIS — Z79.899 IMMUNOSUPPRESSION DUE TO DRUG THERAPY: ICD-10-CM

## 2025-06-04 DIAGNOSIS — Z91.89 AT RISK FOR OPPORTUNISTIC INFECTIONS: Primary | ICD-10-CM

## 2025-06-04 DIAGNOSIS — T83.89XA RETAINED URETERAL STENT OF TRANSPLANTED KIDNEY: Primary | ICD-10-CM

## 2025-06-04 DIAGNOSIS — T86.19 DELAYED GRAFT FUNCTION OF KIDNEY TRANSPLANT DUE TO ATN REQUIRING ACUTE DIALYSIS: ICD-10-CM

## 2025-06-04 DIAGNOSIS — T83.122D DISPLACEMENT OF URETERAL STENT, SUBSEQUENT ENCOUNTER: ICD-10-CM

## 2025-06-04 DIAGNOSIS — Z94.0 STATUS POST DECEASED-DONOR KIDNEY TRANSPLANTATION: Primary | ICD-10-CM

## 2025-06-04 DIAGNOSIS — Z94.0 STATUS POST DECEASED-DONOR KIDNEY TRANSPLANTATION: ICD-10-CM

## 2025-06-04 DIAGNOSIS — D84.821 IMMUNOSUPPRESSION DUE TO DRUG THERAPY: ICD-10-CM

## 2025-06-04 DIAGNOSIS — Z94.0 IMMUNOSUPPRESSIVE MANAGEMENT ENCOUNTER FOLLOWING KIDNEY TRANSPLANT: ICD-10-CM

## 2025-06-04 DIAGNOSIS — Z91.89 AT RISK FOR OPPORTUNISTIC INFECTIONS: ICD-10-CM

## 2025-06-04 DIAGNOSIS — Z79.899 IMMUNOSUPPRESSIVE MANAGEMENT ENCOUNTER FOLLOWING KIDNEY TRANSPLANT: ICD-10-CM

## 2025-06-04 DIAGNOSIS — R00.0 TACHYCARDIA: ICD-10-CM

## 2025-06-04 DIAGNOSIS — Z29.89 PROPHYLACTIC IMMUNOTHERAPY: ICD-10-CM

## 2025-06-04 DIAGNOSIS — Z94.0 KIDNEY REPLACED BY TRANSPLANT: ICD-10-CM

## 2025-06-04 PROCEDURE — 99999 PR PBB SHADOW E&M-EST. PATIENT-LVL IV: CPT | Mod: PBBFAC,,,

## 2025-06-04 PROCEDURE — 99213 OFFICE O/P EST LOW 20 MIN: CPT | Mod: PBBFAC,27 | Performed by: NURSE PRACTITIONER

## 2025-06-04 PROCEDURE — 99215 OFFICE O/P EST HI 40 MIN: CPT | Mod: 24,S$PBB,, | Performed by: TRANSPLANT SURGERY

## 2025-06-04 PROCEDURE — 99215 OFFICE O/P EST HI 40 MIN: CPT | Mod: S$PBB,,, | Performed by: NURSE PRACTITIONER

## 2025-06-04 PROCEDURE — 99214 OFFICE O/P EST MOD 30 MIN: CPT | Mod: PBBFAC

## 2025-06-04 PROCEDURE — 99999 PR PBB SHADOW E&M-EST. PATIENT-LVL III: CPT | Mod: PBBFAC,,, | Performed by: NURSE PRACTITIONER

## 2025-06-04 RX ORDER — MYCOPHENOLATE MOFETIL 250 MG/1
1000 CAPSULE ORAL 2 TIMES DAILY
Qty: 240 CAPSULE | Refills: 11 | Status: SHIPPED | OUTPATIENT
Start: 2025-06-04

## 2025-06-04 RX ORDER — METOPROLOL SUCCINATE 50 MG/1
50 TABLET, EXTENDED RELEASE ORAL DAILY
Qty: 90 TABLET | Refills: 3 | Status: SHIPPED | OUTPATIENT
Start: 2025-06-04 | End: 2026-06-04

## 2025-06-04 RX ORDER — TACROLIMUS 1 MG/1
1 CAPSULE ORAL EVERY 12 HOURS
Qty: 240 CAPSULE | Refills: 11 | Status: SHIPPED | OUTPATIENT
Start: 2025-06-04 | End: 2025-06-05 | Stop reason: DRUGHIGH

## 2025-06-04 RX ORDER — TACROLIMUS 0.5 MG/1
0.5 CAPSULE ORAL EVERY 12 HOURS
Qty: 60 CAPSULE | Refills: 11 | Status: SHIPPED | OUTPATIENT
Start: 2025-06-04 | End: 2025-06-05 | Stop reason: SDUPTHER

## 2025-06-04 RX ORDER — SULFAMETHOXAZOLE AND TRIMETHOPRIM 400; 80 MG/1; MG/1
1 TABLET ORAL DAILY
Qty: 30 TABLET | Refills: 5 | Status: SHIPPED | OUTPATIENT
Start: 2025-06-04 | End: 2025-11-11

## 2025-06-04 RX ORDER — PREDNISONE 5 MG/1
TABLET ORAL
Qty: 70 TABLET | Refills: 11 | Status: SHIPPED | OUTPATIENT
Start: 2025-06-04

## 2025-06-04 RX ORDER — VALGANCICLOVIR 450 MG/1
450 TABLET, FILM COATED ORAL DAILY
Qty: 60 TABLET | Refills: 5 | Status: SHIPPED | OUTPATIENT
Start: 2025-06-04 | End: 2025-11-04

## 2025-06-05 ENCOUNTER — TELEPHONE (OUTPATIENT)
Dept: TRANSPLANT | Facility: CLINIC | Age: 25
End: 2025-06-05
Payer: MEDICARE

## 2025-06-05 ENCOUNTER — RESULTS FOLLOW-UP (OUTPATIENT)
Dept: TRANSPLANT | Facility: CLINIC | Age: 25
End: 2025-06-05
Payer: MEDICARE

## 2025-06-05 ENCOUNTER — PROCEDURE VISIT (OUTPATIENT)
Dept: UROLOGY | Facility: CLINIC | Age: 25
End: 2025-06-05
Payer: MEDICARE

## 2025-06-05 ENCOUNTER — LAB VISIT (OUTPATIENT)
Dept: LAB | Facility: HOSPITAL | Age: 25
End: 2025-06-05
Attending: STUDENT IN AN ORGANIZED HEALTH CARE EDUCATION/TRAINING PROGRAM
Payer: MEDICARE

## 2025-06-05 VITALS
SYSTOLIC BLOOD PRESSURE: 103 MMHG | TEMPERATURE: 97 F | BODY MASS INDEX: 23.81 KG/M2 | RESPIRATION RATE: 17 BRPM | WEIGHT: 130.19 LBS | HEART RATE: 95 BPM | DIASTOLIC BLOOD PRESSURE: 79 MMHG

## 2025-06-05 DIAGNOSIS — Z94.0 STATUS POST DECEASED-DONOR KIDNEY TRANSPLANTATION: ICD-10-CM

## 2025-06-05 DIAGNOSIS — Z94.0 KIDNEY REPLACED BY TRANSPLANT: ICD-10-CM

## 2025-06-05 LAB
ABSOLUTE EOSINOPHIL (OHS): 0.12 K/UL
ABSOLUTE MONOCYTE (OHS): 0.23 K/UL (ref 0.3–1)
ABSOLUTE NEUTROPHIL COUNT (OHS): 3.41 K/UL (ref 1.8–7.7)
ALBUMIN SERPL BCP-MCNC: 4.5 G/DL (ref 3.5–5.2)
ANION GAP (OHS): 11 MMOL/L (ref 8–16)
BASOPHILS # BLD AUTO: 0.05 K/UL
BASOPHILS NFR BLD AUTO: 1 %
BUN SERPL-MCNC: 55 MG/DL (ref 6–20)
CALCIUM SERPL-MCNC: 9.9 MG/DL (ref 8.7–10.5)
CHLORIDE SERPL-SCNC: 109 MMOL/L (ref 95–110)
CO2 SERPL-SCNC: 23 MMOL/L (ref 23–29)
CREAT SERPL-MCNC: 2.2 MG/DL (ref 0.5–1.4)
ERYTHROCYTE [DISTWIDTH] IN BLOOD BY AUTOMATED COUNT: 15.6 % (ref 11.5–14.5)
GFR SERPLBLD CREATININE-BSD FMLA CKD-EPI: 31 ML/MIN/1.73/M2
GLUCOSE SERPL-MCNC: 91 MG/DL (ref 70–110)
HCT VFR BLD AUTO: 32.4 % (ref 37–48.5)
HGB BLD-MCNC: 10.4 GM/DL (ref 12–16)
IMM GRANULOCYTES # BLD AUTO: 0.02 K/UL (ref 0–0.04)
IMM GRANULOCYTES NFR BLD AUTO: 0.4 % (ref 0–0.5)
LYMPHOCYTES # BLD AUTO: 1.1 K/UL (ref 1–4.8)
MAGNESIUM SERPL-MCNC: 1.9 MG/DL (ref 1.6–2.6)
MCH RBC QN AUTO: 30.1 PG (ref 27–31)
MCHC RBC AUTO-ENTMCNC: 32.1 G/DL (ref 32–36)
MCV RBC AUTO: 94 FL (ref 82–98)
NUCLEATED RBC (/100WBC) (OHS): 0 /100 WBC
PHOSPHATE SERPL-MCNC: 3.3 MG/DL (ref 2.7–4.5)
PLATELET # BLD AUTO: 288 K/UL (ref 150–450)
PMV BLD AUTO: 9.4 FL (ref 9.2–12.9)
POTASSIUM SERPL-SCNC: 4.2 MMOL/L (ref 3.5–5.1)
RBC # BLD AUTO: 3.46 M/UL (ref 4–5.4)
RELATIVE EOSINOPHIL (OHS): 2.4 %
RELATIVE LYMPHOCYTE (OHS): 22.3 % (ref 18–48)
RELATIVE MONOCYTE (OHS): 4.7 % (ref 4–15)
RELATIVE NEUTROPHIL (OHS): 69.2 % (ref 38–73)
SODIUM SERPL-SCNC: 143 MMOL/L (ref 136–145)
TACROLIMUS BLD-MCNC: 5.5 NG/ML (ref 5–15)
WBC # BLD AUTO: 4.93 K/UL (ref 3.9–12.7)

## 2025-06-05 PROCEDURE — 83735 ASSAY OF MAGNESIUM: CPT

## 2025-06-05 PROCEDURE — 36415 COLL VENOUS BLD VENIPUNCTURE: CPT

## 2025-06-05 PROCEDURE — 80197 ASSAY OF TACROLIMUS: CPT

## 2025-06-05 PROCEDURE — 80069 RENAL FUNCTION PANEL: CPT

## 2025-06-05 PROCEDURE — 85025 COMPLETE CBC W/AUTO DIFF WBC: CPT

## 2025-06-05 RX ORDER — LIDOCAINE HYDROCHLORIDE 20 MG/ML
JELLY TOPICAL
Status: COMPLETED | OUTPATIENT
Start: 2025-06-05 | End: 2025-06-05

## 2025-06-05 RX ORDER — TACROLIMUS 1 MG/1
CAPSULE ORAL
Qty: 240 CAPSULE | Refills: 11 | Status: SHIPPED | OUTPATIENT
Start: 2025-06-05

## 2025-06-05 RX ORDER — TACROLIMUS 0.5 MG/1
0.5 CAPSULE ORAL NIGHTLY
Qty: 30 CAPSULE | Refills: 11 | Status: SHIPPED | OUTPATIENT
Start: 2025-06-05 | End: 2026-06-05

## 2025-06-05 RX ADMIN — LIDOCAINE HYDROCHLORIDE 5 ML: 20 JELLY TOPICAL at 10:06

## 2025-06-06 ENCOUNTER — PATIENT MESSAGE (OUTPATIENT)
Dept: UROLOGY | Facility: CLINIC | Age: 25
End: 2025-06-06
Payer: MEDICARE

## 2025-06-06 ENCOUNTER — TELEPHONE (OUTPATIENT)
Dept: INTERVENTIONAL RADIOLOGY/VASCULAR | Facility: HOSPITAL | Age: 25
End: 2025-06-06
Payer: MEDICARE

## 2025-06-06 ENCOUNTER — HOSPITAL ENCOUNTER (OUTPATIENT)
Dept: RADIOLOGY | Facility: HOSPITAL | Age: 25
Discharge: HOME | End: 2025-06-06
Attending: UROLOGY
Payer: COMMERCIAL

## 2025-06-06 ENCOUNTER — TELEPHONE (OUTPATIENT)
Dept: TRANSPLANT | Facility: CLINIC | Age: 25
End: 2025-06-06
Payer: MEDICARE

## 2025-06-06 DIAGNOSIS — T83.89XA RETAINED URETERAL STENT OF TRANSPLANTED KIDNEY: Primary | ICD-10-CM

## 2025-06-06 DIAGNOSIS — T86.19 RETAINED URETERAL STENT OF TRANSPLANTED KIDNEY: Primary | ICD-10-CM

## 2025-06-06 DIAGNOSIS — Z94.0 STATUS POST DECEASED-DONOR KIDNEY TRANSPLANTATION: Primary | ICD-10-CM

## 2025-06-06 DIAGNOSIS — Z94.0 S/P KIDNEY TRANSPLANT: ICD-10-CM

## 2025-06-06 DIAGNOSIS — Z94.0 KIDNEY REPLACED BY TRANSPLANT: ICD-10-CM

## 2025-06-06 PROCEDURE — 74018 RADEX ABDOMEN 1 VIEW: CPT | Mod: 26,,, | Performed by: RADIOLOGY

## 2025-06-06 PROCEDURE — 74018 RADEX ABDOMEN 1 VIEW: CPT | Mod: TC

## 2025-06-06 RX ORDER — LIDOCAINE HYDROCHLORIDE 10 MG/ML
1 INJECTION, SOLUTION EPIDURAL; INFILTRATION; INTRACAUDAL; PERINEURAL ONCE
OUTPATIENT
Start: 2025-06-06 | End: 2025-06-06

## 2025-06-06 RX ORDER — SODIUM CHLORIDE 9 MG/ML
INJECTION, SOLUTION INTRAVENOUS CONTINUOUS
OUTPATIENT
Start: 2025-06-06

## 2025-06-06 NOTE — H&P (VIEW-ONLY)
CC: s/p kidney transplant, suspected retained ureteral stent    The patient is a 24 y.o. year-old female with end-stage kidney disease secondary to Other, Specify - Retransplant who has been evaluated for a kidney transplant.  The procedure was thoroughly discussed with the patient, including potential risks, complications, and alternatives.  Specific complications mentioned included death, graft non-function, bleeding, infection, and rejection, as well as the possibility of other complications not specifically mentioned.  Date of Procedure: 5/7/2025  Date of Transplant (UNOS): 5/7/25     Surgeons:  Surgeons and Role:     * Jeff Stern MD - Primary     * Xu Patel MD - Resident - Assisting     * Glory Rivera MD - Fellow     First Assistant Attestation:  The indicated resident served as first assistant for this procedure.     Pre-operative Diagnosis: ESRD, requiring chronic dialysis secondary to Other, Specify - Retransplant  Post-operative Diagnosis: Same     Procedure(s) Performed:   Back Table Preparation of Left Kidney     Donation after Brain Death Kidney transplant  Removal of peritoneal dialysis catheter     Anesthesia: General endotracheal    Following her kidney transplant, she referred to me for ureteral stent removal on 6/5/25.     Date:    06/05/2025     Procedure: Female Cystoscopy:   Pre-op diagnosis: s/p kidney transplant   Post-op diagnosis: no ureteral stent found   Anesthesia: Local   Surgeon:  Oliver Bermudez MD     Findings:   Urethra: Normal urethra.   Bladder Neck: Patent and competent with mobility, normal   Bladder:  Normal bladder.  No stent noted from her native bilateral   ureteral orifices.   Kidney transplant UO noted anterior wall on each side, but again no   ureteral stent noted.   Mild mucosa irritation noted.     Description of Procedure:                                                         Informed Consent:                                                              -  Risks, benefits and alternatives of procedure discussed with                 patient and informed consent obtained.     Patient Position:       - Dorsal lithotomy.   Prep and Drape:       - Patient prepped and draped in usual sterile fashion using povidone   iodine (Betadine).   Instruments:      - 16 Fr flexible cystoscope with 0 degree lens.   Procedure Details:       - Cystoscope passed under vision into bladder.        - Bladder and urethra examined in their entirety with findings as   above.     Conclusion:   1. S/p Transplant kidney stone.   No ureteral stent found in the bladder,   Will contact the transplant team to order KUB to make sure that pt has no   retained ureteral stent.     Pt is very tense and anxious. May give her oral valium prior to additional   cysto if required.     KUB 25  The right-sided surgical drain has been removed.  There is a pigtail catheter, presumably a ureteral stent, in the right abdomen.  The proximal aspect is located at the level of L3 similar to the previous film,  inferior to the expected location of the right renal collecting system.  It generally follows the expected course of the ureter to terminate in the midline of the pelvis.     Areas of dense calcification are again identified in the pelvis bilaterally related to calcified prior renal transplants.  There are numerous surgical clips in the right pelvis.  The bowel gas pattern is nonobstructive.  The bones are intact.      ASSESSMENT and PLANS:    Retained ureteral stent of transplanted kidney    Displacement of ureteral stent, subsequent encounter    Status post -donor kidney transplantation     I informed the pt of the above finding.  Will schedule her for cysto and right ureteroscopic retained ureteral stent removal from the transplant kidney under general anesthesia on 6/10/25, Tues.

## 2025-06-09 ENCOUNTER — NURSE TRIAGE (OUTPATIENT)
Dept: ADMINISTRATIVE | Facility: CLINIC | Age: 25
End: 2025-06-09
Payer: MEDICARE

## 2025-06-09 ENCOUNTER — TELEPHONE (OUTPATIENT)
Dept: INTERVENTIONAL RADIOLOGY/VASCULAR | Facility: HOSPITAL | Age: 25
End: 2025-06-09
Payer: MEDICARE

## 2025-06-09 ENCOUNTER — LAB VISIT (OUTPATIENT)
Dept: LAB | Facility: HOSPITAL | Age: 25
End: 2025-06-09
Attending: STUDENT IN AN ORGANIZED HEALTH CARE EDUCATION/TRAINING PROGRAM
Payer: MEDICARE

## 2025-06-09 ENCOUNTER — TELEPHONE (OUTPATIENT)
Dept: UROLOGY | Facility: CLINIC | Age: 25
End: 2025-06-09
Payer: MEDICARE

## 2025-06-09 DIAGNOSIS — Z94.0 KIDNEY REPLACED BY TRANSPLANT: ICD-10-CM

## 2025-06-09 LAB
ABSOLUTE EOSINOPHIL (OHS): 0.11 K/UL
ABSOLUTE MONOCYTE (OHS): 0.19 K/UL (ref 0.3–1)
ABSOLUTE NEUTROPHIL COUNT (OHS): 3.19 K/UL (ref 1.8–7.7)
ALBUMIN SERPL BCP-MCNC: 4.2 G/DL (ref 3.5–5.2)
ANION GAP (OHS): 12 MMOL/L (ref 8–16)
BASOPHILS # BLD AUTO: 0.05 K/UL
BASOPHILS NFR BLD AUTO: 1.1 %
BUN SERPL-MCNC: 42 MG/DL (ref 6–20)
CALCIUM SERPL-MCNC: 9.4 MG/DL (ref 8.7–10.5)
CHLORIDE SERPL-SCNC: 111 MMOL/L (ref 95–110)
CO2 SERPL-SCNC: 18 MMOL/L (ref 23–29)
CREAT SERPL-MCNC: 1.9 MG/DL (ref 0.5–1.4)
CREAT UR-MCNC: 70 MG/DL (ref 15–325)
ERYTHROCYTE [DISTWIDTH] IN BLOOD BY AUTOMATED COUNT: 15.2 % (ref 11.5–14.5)
GFR SERPLBLD CREATININE-BSD FMLA CKD-EPI: 37 ML/MIN/1.73/M2
GLUCOSE SERPL-MCNC: 84 MG/DL (ref 70–110)
HCT VFR BLD AUTO: 29.3 % (ref 37–48.5)
HGB BLD-MCNC: 9.5 GM/DL (ref 12–16)
IMM GRANULOCYTES # BLD AUTO: 0.01 K/UL (ref 0–0.04)
IMM GRANULOCYTES NFR BLD AUTO: 0.2 % (ref 0–0.5)
LYMPHOCYTES # BLD AUTO: 0.81 K/UL (ref 1–4.8)
MAGNESIUM SERPL-MCNC: 1.8 MG/DL (ref 1.6–2.6)
MCH RBC QN AUTO: 29.7 PG (ref 27–31)
MCHC RBC AUTO-ENTMCNC: 32.4 G/DL (ref 32–36)
MCV RBC AUTO: 92 FL (ref 82–98)
NUCLEATED RBC (/100WBC) (OHS): 0 /100 WBC
PHOSPHATE SERPL-MCNC: 2.8 MG/DL (ref 2.7–4.5)
PLATELET # BLD AUTO: 267 K/UL (ref 150–450)
PMV BLD AUTO: 8.8 FL (ref 9.2–12.9)
POTASSIUM SERPL-SCNC: 3.8 MMOL/L (ref 3.5–5.1)
PROT UR-MCNC: 24 MG/DL
PROT/CREAT UR: 0.34 MG/G{CREAT}
RBC # BLD AUTO: 3.2 M/UL (ref 4–5.4)
RELATIVE EOSINOPHIL (OHS): 2.5 %
RELATIVE LYMPHOCYTE (OHS): 18.6 % (ref 18–48)
RELATIVE MONOCYTE (OHS): 4.4 % (ref 4–15)
RELATIVE NEUTROPHIL (OHS): 73.2 % (ref 38–73)
SODIUM SERPL-SCNC: 141 MMOL/L (ref 136–145)
WBC # BLD AUTO: 4.36 K/UL (ref 3.9–12.7)

## 2025-06-09 PROCEDURE — 85025 COMPLETE CBC W/AUTO DIFF WBC: CPT

## 2025-06-09 PROCEDURE — 80197 ASSAY OF TACROLIMUS: CPT

## 2025-06-09 PROCEDURE — 83735 ASSAY OF MAGNESIUM: CPT

## 2025-06-09 PROCEDURE — 36415 COLL VENOUS BLD VENIPUNCTURE: CPT

## 2025-06-09 PROCEDURE — 84156 ASSAY OF PROTEIN URINE: CPT

## 2025-06-09 PROCEDURE — 80069 RENAL FUNCTION PANEL: CPT

## 2025-06-09 RX ORDER — CEFAZOLIN 2 G/1
2 INJECTION, POWDER, FOR SOLUTION INTRAMUSCULAR; INTRAVENOUS
Status: CANCELLED | OUTPATIENT
Start: 2025-06-09

## 2025-06-09 NOTE — NURSING
Pre-procedure call complete.  2 patient identifier used (name and ).   Arrival time 11:30.      Arrival time and location given.  Patient verbalized understanding of all pre-procedure instructions.  Written instructions and directions sent to patient in Mychart/portal.

## 2025-06-09 NOTE — PRE-PROCEDURE INSTRUCTIONS
Anesthesia Pre-Op Instructions given:     -- YOUR SURGEON'S OFFICE WILL PROVIDE YOUR ARRIVAL TIME  -- Medication information (what to hold and what to take)   -- NPO guidelines as follows: (or as per your Surgeon)  Stop ALL solid food, gum, candy 8 hours before arrival time.  Stop all CLOUDY liquids: coffee with creamer, cloudy juices, 8 hours prior to arrival time.  The patient should be ENCOURAGED to drink carbohydrate-rich clear liquids (sports drinks, clear juices) until 2 hours prior to arrival time.  Stop clear liquids 2 hours prior to arrival time.  CLEAR liquids include water, black coffee NO creamer, clear oral rehydration drinks, clear sports drinks and clear fruit juices (no orange juice, no pulpy juices, no apple cider).   IF IN DOUBT, drink water instead.   NOTHING TO DRINK 2 hours before surgery/procedure time. If you are told to take medication on the morning of surgery, it may be taken with a sip of water.   -- *Arrival place and directions given*.  Time to be given the day before procedure by the Surgeon's Office   -- Bathe with antibacterial soap (dial or Hibiclens as instructed)  -- Don't wear any jewelry or valuables and not metals on skin or hair AM of surgery   -- No makeup or moisturizer to face   -- No perfume/cologne, powder, lotions, aftershave or deodorant     Pt verbalized understanding.            *If going to , see below:      Directions and Instructions for [facility]   At [facility], we have an outstanding team of physicians, anesthesiologists, CRNAs, Registered Nurses, Surgical Technologists, and other ancillary team members all focused on your surgical and procedural care.   Before Your Procedure:   The physician's office will call you with a specific arrival time and directions a day or two before your scheduled procedure. You may also receive these instructions through your [facility] portal.   Day of Procedure:   Please be sure to arrive at  the arrival time given or you may risk your surgery being delayed or canceled. The arrival time is earlier than your scheduled surgery or procedure time. In the winter months please dress warm and bring blankets for you or your child as the waiting room may be cold. If you have difficulty locating the facility, please give us a call at [phone].   Directions:   The [facility] is located on the 1st floor of the hospital building near the [address] entrance.   Parking:   You will park in the South Parking Garage (note location on map). [facility] opens at 5:00 a.m. and has a drop off area by the entrance.  parking is available starting at 7:00 a.m. Please see below for further  parking instructions.   Directions from the parking garage elevators   Blue [facility] Elevators: From the parking garage, take the blue [employer] elevators (located in the center of the parking garage) to the 1st floor of the garage. You will then take a right once off the elevators then another right to the outside of the parking garage. You will be across from the [facility]. You will walk down the sidewalk, pass the  curve at the [address] entrance and continue to follow the sidewalk. You will pass the radiation oncology entrance on your right. Continue to follow the sidewalk to the [facility] glass door entrance.   Hospital Entrance (Inside Route): If a mostly inside route is preferred: Take the inside elevator bank (located at the far north end of the garage) from the parking garage to the 1st floor. On the 1st floor walk past [employer]. Keep walking down the center of the hallway towards the hospital elevators. Once you reach the red brick alistair, take a left and go past the hospital elevators. Take another left and follow the blue and white [employer] [employer] signs around the hallway to the end. Go outside of the door. You will see the [facility]  Center entrance to your right.   Drop Off:   There is a drop off area at the doors of the [facility] for your convenience. If utilized for pediatric patients, an adult must accompany the patient into the surgery center while another adult jimenez the vehicle.    (at 7:00 a.m.):   Upon check-in, please let the  know that you are utilizing [employer] parking which is free. The . will then call [employer] for your car to be picked up. Your keys and phone number will be collected and given to [employer] services. You will then be given a ticket. Upon discharge, [employer] will be notified to bring your vehicle back when you are ready.           Directions to [facility] [place]:  [phone]     From 1st floor garage elevators: go past [facility] untapt shop. Look for Black piano on side of coffee shop. Take Atrium (gold) elevator by piano up to 2nd floor. When you exit elevator follow long hallway (you should see a sign hanging from the ceiling that says Day of Surgery Family Waiting Room. When hallway ends you will be entering the day of surgery waiting area. Check in at the desk for your procedure.      From [facility] entrance: Make a right after you enter the door to the hospital. On your Left, take Concourse elevator to 2nd floor. Check in at desk      From Lab desk on 2nd floor: Exit lab area toward hospital atrium. You should see a sign that says Day of Surgery Family Waiting Area. Make a Left and follow long hallway (you should see a sign hanging from the ceiling that says Day of Surgery Family Waiting Room. When hallway ends you will be entering the day of surgery waiting area. Check in at the desk for your procedure.

## 2025-06-10 ENCOUNTER — HOSPITAL ENCOUNTER (OUTPATIENT)
Facility: HOSPITAL | Age: 25
Discharge: HOME | End: 2025-06-10
Attending: UROLOGY | Admitting: UROLOGY
Payer: MEDICARE

## 2025-06-10 ENCOUNTER — PATIENT MESSAGE (OUTPATIENT)
Dept: UROLOGY | Facility: CLINIC | Age: 25
End: 2025-06-10
Payer: MEDICARE

## 2025-06-10 ENCOUNTER — RESULTS FOLLOW-UP (OUTPATIENT)
Dept: TRANSPLANT | Facility: HOSPITAL | Age: 25
End: 2025-06-10
Payer: MEDICARE

## 2025-06-10 ENCOUNTER — PATIENT MESSAGE (OUTPATIENT)
Dept: TRANSPLANT | Facility: CLINIC | Age: 25
End: 2025-06-10
Payer: MEDICARE

## 2025-06-10 DIAGNOSIS — Z94.0 STATUS POST DECEASED-DONOR KIDNEY TRANSPLANTATION: ICD-10-CM

## 2025-06-10 DIAGNOSIS — T83.89XA RETAINED URETERAL STENT OF TRANSPLANTED KIDNEY: Primary | ICD-10-CM

## 2025-06-10 DIAGNOSIS — Z96.0 RETAINED URETERAL STENT: ICD-10-CM

## 2025-06-10 DIAGNOSIS — T86.19 RETAINED URETERAL STENT OF TRANSPLANTED KIDNEY: Primary | ICD-10-CM

## 2025-06-10 DIAGNOSIS — Z94.0 KIDNEY REPLACED BY TRANSPLANT: Primary | ICD-10-CM

## 2025-06-10 LAB — TACROLIMUS BLD-MCNC: 6.1 NG/ML (ref 5–15)

## 2025-06-10 PROCEDURE — 87186 SC STD MICRODIL/AGAR DIL: CPT

## 2025-06-10 RX ORDER — TACROLIMUS 0.5 MG/1
0.5 CAPSULE ORAL EVERY MORNING
Qty: 30 CAPSULE | Refills: 11 | Status: SHIPPED | OUTPATIENT
Start: 2025-06-10

## 2025-06-10 RX ORDER — TACROLIMUS 1 MG/1
2 CAPSULE ORAL EVERY 12 HOURS
Qty: 120 CAPSULE | Refills: 11 | Status: SHIPPED | OUTPATIENT
Start: 2025-06-10

## 2025-06-10 RX ORDER — CIPROFLOXACIN 500 MG/1
500 TABLET, FILM COATED ORAL 2 TIMES DAILY
Qty: 20 TABLET | Refills: 0 | Status: SHIPPED | OUTPATIENT
Start: 2025-06-10 | End: 2025-06-20

## 2025-06-10 NOTE — TELEPHONE ENCOUNTER
Pt is a Kidney Transplant pt as of 5/7/2025, Pt stated that she took her medications and she thinks she threw the entire medications up. Prograf, Bactrim, and Valcyte. Pt wants to know should she drink a little Sprite to settle her stomach, and  try to take the the medications again. BPA 9 used. Dispo-  now. Reached out to On call provider, Dr. Colton Dos Santos and he advised If she can take the prograf tonight that's the most important one, The others she can take in morning if she feels better.   If she doesn't feel better she should tell her coordinator. Pt aware and VU. Advised pt to call back with any further concerns or questions.           Reason for Disposition   [1] Caller has URGENT medicine question about med that primary care doctor (or NP/PA) or specialist prescribed AND [2] triager unable to answer question    Additional Information   Negative: [1] Intentional drug overdose AND [2] suicidal thoughts or ideas   Negative: MORE THAN A DOUBLE DOSE of a prescription or over-the-counter (OTC) drug   Negative: [1] DOUBLE DOSE (an extra dose or lesser amount) of prescription drug AND [2] any symptoms (e.g., dizziness, nausea, pain, sleepiness)   Negative: [1] DOUBLE DOSE (an extra dose or lesser amount) of over-the-counter (OTC) drug AND [2] any symptoms (e.g., dizziness, nausea, pain, sleepiness)   Negative: Took another person's prescription drug   Negative: [1] DOUBLE DOSE (an extra dose or lesser amount) of prescription drug AND [2] NO symptoms  (Exception: A double dose of antibiotics.)   Negative: Diabetes drug error or overdose (e.g., took wrong type of insulin or took extra dose)   Negative: [1] Prescription not at pharmacy AND [2] was prescribed by doctor (or NP/PA) recently  (Exception: Triager has access to EMR and prescription is recorded there. Go to Home Care and confirm for pharmacy.)   Negative: [1] Pharmacy calling with prescription question AND [2] triager unable to answer  question    Protocols used: Medication Question Call-A-AH

## 2025-06-10 NOTE — DISCHARGE SUMMARY
[facility] - Surgery (1st Fl)  Discharge Note  Short Stay    Procedure(s) (LRB):  CYSTOSCOPY, WITH URETERAL STENT REMOVAL (Right)      OUTCOME: Urine sample today nitrite positive. Patient denies taking pyridium. Will send urine for culture and reschedule for next week.    DISPOSITION: Home or Self Care    FINAL DIAGNOSIS:  Retained ureteral stent    FOLLOWUP: In clinic    DISCHARGE INSTRUCTIONS:  No discharge procedures on file.     TIME SPENT ON DISCHARGE: 15 minutes

## 2025-06-10 NOTE — PROGRESS NOTES
Retained ureteral stent of transplanted kidney  -     Case Request Operating Room: CYSTOURETEROSCOPY, W/ THULIUM LASER LITHOTRIPSY OF URETERAL CALCULUS & STENT REMOVAL

## 2025-06-10 NOTE — TELEPHONE ENCOUNTER
Received new VORB from Dr. Dos Santos to increase Prograf dose change to 2.5 mg in the morning and 2 mg in the evening.  Messaged patient and instructed with this new dose change.  Asked pt to contact us with any questions.  KUB discussed with patient and scheduled for 6/12/25.       ----- Message from Colton Dos Santos MD sent at 6/10/2025 12:40 PM CDT -----  If true tac trough, increase dose from 3/3 to 4/4 and repeat in 1 week. Obtain KUB as requested by urology to make sure stent not present. Was not seen during cysto with Dr. Bermudez. Other labs   acceptable.  ----- Message -----  From: Lab, Background User  Sent: 6/9/2025   8:28 AM CDT  To: Colton Dos Santos Jr., MD

## 2025-06-11 ENCOUNTER — TELEPHONE (OUTPATIENT)
Dept: TRANSPLANT | Facility: CLINIC | Age: 25
End: 2025-06-11
Payer: MEDICARE

## 2025-06-11 NOTE — TELEPHONE ENCOUNTER
Copied from CRM #[id number]. Topic: Medications - Medication Question  >> Jun 11, 2025  8:03 AM Gracie wrote:  Type:  Needs Medical Advice    Who Called: Anna Gabriel   Calling with question regarding medication   She was told they found bacteria in her urine and placed her on ciprofloxain it is to be taken twice daily  She thought she was getting a double dose of  bactrim   Don't want take anything wrong   Would the patient rather a call back or a response via [facility]? Call back   Best Call Back Number: [phone]    Additional Information:

## 2025-06-11 NOTE — TELEPHONE ENCOUNTER
"Copied from CRM #[id number]. Topic: Medications - Medication Question  >> Jun 11, 2025  2:21 PM Lm wrote:  Consult/Advisory:        Name Of Caller: Self     Contact Preference?:[phone]      What is the nature of the call?: Calling to speak w/ coordinator in regards to needing calcification on ciprofloxacin HCl (CIPRO) 500 MG tablet      Additional Notes:  "Thank you for all that you do for our patients"  "

## 2025-06-12 ENCOUNTER — HOSPITAL ENCOUNTER (OUTPATIENT)
Dept: RADIOLOGY | Facility: HOSPITAL | Age: 25
Discharge: HOME | End: 2025-06-12
Attending: STUDENT IN AN ORGANIZED HEALTH CARE EDUCATION/TRAINING PROGRAM
Payer: MEDICARE

## 2025-06-12 ENCOUNTER — TELEPHONE (OUTPATIENT)
Dept: UROLOGY | Facility: CLINIC | Age: 25
End: 2025-06-12
Payer: MEDICARE

## 2025-06-12 ENCOUNTER — RESULTS FOLLOW-UP (OUTPATIENT)
Dept: TRANSPLANT | Facility: HOSPITAL | Age: 25
End: 2025-06-12

## 2025-06-12 ENCOUNTER — PATIENT MESSAGE (OUTPATIENT)
Dept: UROLOGY | Facility: CLINIC | Age: 25
End: 2025-06-12
Payer: MEDICARE

## 2025-06-12 ENCOUNTER — HOSPITAL ENCOUNTER (OUTPATIENT)
Dept: INTERVENTIONAL RADIOLOGY/VASCULAR | Facility: HOSPITAL | Age: 25
Discharge: HOME | End: 2025-06-12
Attending: FAMILY MEDICINE | Admitting: STUDENT IN AN ORGANIZED HEALTH CARE EDUCATION/TRAINING PROGRAM
Payer: MEDICARE

## 2025-06-12 ENCOUNTER — LAB VISIT (OUTPATIENT)
Dept: LAB | Facility: HOSPITAL | Age: 25
End: 2025-06-12
Attending: STUDENT IN AN ORGANIZED HEALTH CARE EDUCATION/TRAINING PROGRAM
Payer: MEDICARE

## 2025-06-12 VITALS
RESPIRATION RATE: 16 BRPM | HEART RATE: 89 BPM | TEMPERATURE: 97 F | WEIGHT: 130 LBS | HEIGHT: 62 IN | BODY MASS INDEX: 23.92 KG/M2 | SYSTOLIC BLOOD PRESSURE: 99 MMHG | OXYGEN SATURATION: 100 % | DIASTOLIC BLOOD PRESSURE: 67 MMHG

## 2025-06-12 DIAGNOSIS — Z57.8 EMPLOYEE EXPOSURE TO BLOOD: ICD-10-CM

## 2025-06-12 DIAGNOSIS — Z91.89 PERSONAL HISTORY OF POISONING, PRESENTING HAZARDS TO HEALTH: ICD-10-CM

## 2025-06-12 DIAGNOSIS — Z94.0 STATUS POST DECEASED-DONOR KIDNEY TRANSPLANTATION: ICD-10-CM

## 2025-06-12 DIAGNOSIS — Z94.0 KIDNEY REPLACED BY TRANSPLANT: ICD-10-CM

## 2025-06-12 DIAGNOSIS — N18.9 ANEMIA OF RENAL DISEASE: ICD-10-CM

## 2025-06-12 DIAGNOSIS — D63.1 ANEMIA OF RENAL DISEASE: ICD-10-CM

## 2025-06-12 LAB
ABSOLUTE EOSINOPHIL (OHS): 0.1 K/UL
ABSOLUTE MONOCYTE (OHS): 0.32 K/UL (ref 0.3–1)
ABSOLUTE NEUTROPHIL COUNT (OHS): 4.24 K/UL (ref 1.8–7.7)
ALBUMIN SERPL BCP-MCNC: 4.4 G/DL (ref 3.5–5.2)
AMM URATE CRY URNS QL MICRO: ABNORMAL
AMORPH CRY URNS QL MICRO: ABNORMAL
ANION GAP (OHS): 14 MMOL/L (ref 8–16)
B-HCG UR QL: NEGATIVE
BACTERIA #/AREA URNS HPF: ABNORMAL /HPF
BACTERIA UR CULT: ABNORMAL
BASOPHILS # BLD AUTO: 0.03 K/UL
BASOPHILS NFR BLD AUTO: 0.5 %
BILIRUB UR QL STRIP.AUTO: NEGATIVE
BUN SERPL-MCNC: 32 MG/DL (ref 6–20)
CA CARBONATE CRY URNS QL MICRO: ABNORMAL
CA PHOS CRY URNS QL MICRO: ABNORMAL
CALCIUM SERPL-MCNC: 9.8 MG/DL (ref 8.7–10.5)
CAOX CRY URNS QL MICRO: ABNORMAL
CHLORIDE SERPL-SCNC: 106 MMOL/L (ref 95–110)
CLARITY UR: ABNORMAL
CO2 SERPL-SCNC: 19 MMOL/L (ref 23–29)
COLOR UR AUTO: YELLOW
CREAT SERPL-MCNC: 2.2 MG/DL (ref 0.5–1.4)
CTP QC/QA: YES
EPITH CASTS #/AREA URNS LPF: 0 /LPF (ref ?–0)
ERYTHROCYTE [DISTWIDTH] IN BLOOD BY AUTOMATED COUNT: 15.7 % (ref 11.5–14.5)
FATTY CASTS #/AREA URNS LPF: 0 /LPF (ref ?–0)
FERRITIN SERPL-MCNC: 1772 NG/ML (ref 20–300)
GFR SERPLBLD CREATININE-BSD FMLA CKD-EPI: 31 ML/MIN/1.73/M2
GLUCOSE SERPL-MCNC: 100 MG/DL (ref 70–110)
GLUCOSE UR QL STRIP: NEGATIVE
GRAN CASTS #/AREA URNS LPF: 0 /LPF (ref ?–0)
HCT VFR BLD AUTO: 31.1 % (ref 37–48.5)
HGB BLD-MCNC: 10.2 GM/DL (ref 12–16)
HGB UR QL STRIP: ABNORMAL
HYALINE CASTS #/AREA URNS LPF: 0 /LPF (ref 0–1)
IMM GRANULOCYTES # BLD AUTO: 0.04 K/UL (ref 0–0.04)
IMM GRANULOCYTES NFR BLD AUTO: 0.7 % (ref 0–0.5)
IRON SATN MFR SERPL: 50 % (ref 20–50)
IRON SERPL-MCNC: 116 UG/DL (ref 30–160)
KETONES UR QL STRIP: NEGATIVE
LEUKOCYTE ESTERASE UR QL STRIP: ABNORMAL
LYMPHOCYTES # BLD AUTO: 0.89 K/UL (ref 1–4.8)
MAGNESIUM SERPL-MCNC: 1.8 MG/DL (ref 1.6–2.6)
MCH RBC QN AUTO: 29.8 PG (ref 27–31)
MCHC RBC AUTO-ENTMCNC: 32.8 G/DL (ref 32–36)
MCV RBC AUTO: 91 FL (ref 82–98)
MICROSCOPIC COMMENT: ABNORMAL
NITRITE UR QL STRIP: POSITIVE
NON-SQ EPI CELLS #/AREA URNS HPF: 0 /HPF
NUCLEATED RBC (/100WBC) (OHS): 0 /100 WBC
OTHER ELEMENTS URNS MICRO: ABNORMAL
PH UR STRIP: 6 [PH]
PHOSPHATE SERPL-MCNC: 2.4 MG/DL (ref 2.7–4.5)
PLATELET # BLD AUTO: 302 K/UL (ref 150–450)
PMV BLD AUTO: 8.9 FL (ref 9.2–12.9)
POTASSIUM SERPL-SCNC: 3.9 MMOL/L (ref 3.5–5.1)
PROT UR QL STRIP: ABNORMAL
RBC # BLD AUTO: 3.42 M/UL (ref 4–5.4)
RBC #/AREA URNS HPF: 25 /HPF (ref 0–4)
RBC CASTS #/AREA URNS LPF: 0 /LPF (ref ?–0)
RELATIVE EOSINOPHIL (OHS): 1.8 %
RELATIVE LYMPHOCYTE (OHS): 15.8 % (ref 18–48)
RELATIVE MONOCYTE (OHS): 5.7 % (ref 4–15)
RELATIVE NEUTROPHIL (OHS): 75.5 % (ref 38–73)
SODIUM SERPL-SCNC: 139 MMOL/L (ref 136–145)
SP GR UR STRIP: 1.02
SQUAMOUS #/AREA URNS HPF: 5 /HPF
TIBC SERPL-MCNC: 231 UG/DL (ref 250–450)
TRANSFERRIN SERPL-MCNC: 156 MG/DL (ref 200–375)
TRI-PHOS CRY URNS QL MICRO: ABNORMAL
TRICHOMONAS UR QL MICRO: ABNORMAL /HPF
UNIDENT CRYS URNS QL MICRO: ABNORMAL
URATE CRY URNS QL MICRO: ABNORMAL
UROBILINOGEN UR STRIP-ACNC: NEGATIVE EU/DL
WAXY CASTS #/AREA URNS LPF: 0 /LPF (ref ?–0)
WBC # BLD AUTO: 5.62 K/UL (ref 3.9–12.7)
WBC #/AREA URNS HPF: >100 /HPF (ref 0–5)
WBC CASTS #/AREA URNS LPF: 0 /LPF (ref ?–0)
WBC CLUMPS URNS QL MICRO: ABNORMAL
YEAST URNS QL MICRO: ABNORMAL /HPF

## 2025-06-12 PROCEDURE — 85025 COMPLETE CBC W/AUTO DIFF WBC: CPT

## 2025-06-12 PROCEDURE — 81025 URINE PREGNANCY TEST: CPT | Performed by: FAMILY MEDICINE

## 2025-06-12 PROCEDURE — 99900035 HC TECH TIME PER 15 MIN (STAT)

## 2025-06-12 PROCEDURE — 74018 RADEX ABDOMEN 1 VIEW: CPT | Mod: 26,,, | Performed by: RADIOLOGY

## 2025-06-12 PROCEDURE — 86833 HLA CLASS II HIGH DEFIN QUAL: CPT | Performed by: STUDENT IN AN ORGANIZED HEALTH CARE EDUCATION/TRAINING PROGRAM

## 2025-06-12 PROCEDURE — 86977 RBC SERUM PRETX INCUBJ/INHIB: CPT | Mod: 59 | Performed by: STUDENT IN AN ORGANIZED HEALTH CARE EDUCATION/TRAINING PROGRAM

## 2025-06-12 PROCEDURE — 82728 ASSAY OF FERRITIN: CPT

## 2025-06-12 PROCEDURE — 80069 RENAL FUNCTION PANEL: CPT

## 2025-06-12 PROCEDURE — 87536 HIV-1 QUANT&REVRSE TRNSCRPJ: CPT

## 2025-06-12 PROCEDURE — 86832 HLA CLASS I HIGH DEFIN QUAL: CPT | Performed by: STUDENT IN AN ORGANIZED HEALTH CARE EDUCATION/TRAINING PROGRAM

## 2025-06-12 PROCEDURE — 94760 N-INVAS EAR/PLS OXIMETRY 1: CPT

## 2025-06-12 PROCEDURE — 87522 HEPATITIS C REVRS TRNSCRPJ: CPT

## 2025-06-12 PROCEDURE — 87517 HEPATITIS B DNA QUANT: CPT

## 2025-06-12 PROCEDURE — 81003 URINALYSIS AUTO W/O SCOPE: CPT

## 2025-06-12 PROCEDURE — 87799 DETECT AGENT NOS DNA QUANT: CPT

## 2025-06-12 PROCEDURE — 74018 RADEX ABDOMEN 1 VIEW: CPT | Mod: TC

## 2025-06-12 PROCEDURE — 84466 ASSAY OF TRANSFERRIN: CPT

## 2025-06-12 PROCEDURE — 36415 COLL VENOUS BLD VENIPUNCTURE: CPT

## 2025-06-12 PROCEDURE — 83735 ASSAY OF MAGNESIUM: CPT

## 2025-06-12 PROCEDURE — 80197 ASSAY OF TACROLIMUS: CPT

## 2025-06-12 RX ORDER — HALOPERIDOL LACTATE 5 MG/ML
1 INJECTION, SOLUTION INTRAMUSCULAR EVERY 4 HOURS PRN
Status: DISCONTINUED | OUTPATIENT
Start: 2025-06-12 | End: 2025-06-13 | Stop reason: HOSPADM

## 2025-06-12 SDOH — SOCIAL DETERMINANTS OF HEALTH (SDOH): OCCUPATIONAL EXPOSURE TO OTHER RISK FACTORS: Z57.8

## 2025-06-12 NOTE — DISCHARGE INSTRUCTIONS
Central Venous Catheter Removal Discharge Instructions      Information:    A central venous catheter was removed.        What should I expect after the Central venous catheter removal?         There may be some soreness around the access site.     You may return to work unless your primary doctor instructs you otherwise.     You do not have any diet restrictions because of this procedure but should continue any that were given to you by any other doctors.     Continue all previously prescribed medications.       Can I shower?     Bandage may be removed at 48 hours and you can shower as normal after that.    Do not submerge under water for 5 days or until area is completely healed      Follow-up visit information:     It is important to follow up with you primary care provider as well as the provider that sent you to IR.       Occasionally, a situation will require prompt attention, and an emergency room visit is necessary:     Sudden chest pain, shortness of breath, or fainting     Increasing redness, swelling or drainage from the biopsy site     Increasing pain not relieved by medication     Bleeding or drainage from the needle site that is saturating the dressing     You have shaking, chills and/or a temperature over 100.3°F     New, sudden difficulty breathing     Drop in blood pressure, and/or light-headed feeling       Interventional Radiology Clinic   For complications   [phone]. Monday - Friday, 8:00 am - 4:00 pm    [phone] After hours and on holidays. Ask to speak with the interventional radiologist on call.     For Scheduling   [phone] Monday - Friday, 8:00 am - 4:00 pm

## 2025-06-12 NOTE — BRIEF OP NOTE
Radiology Post-Procedure Note    Pre Op Diagnosis: h/o renal failure    Post Op Diagnosis: same    Procedure: tunneled line removal     Procedure performed by: Donya Salazar MD    Written Informed Consent Obtained: Yes    Specimen Removed: YES - line    Estimated Blood Loss: less than 50     Findings:   Tunneled line removal    Patient tolerated procedure well.    Donya Salazar MD  Interventional Radiology

## 2025-06-12 NOTE — PLAN OF CARE
Pt arrived to [facility] for tunnel cath removal. Pt AAO. Pt oriented to unit and staff. Plan of care reviewed with pt and verbalizes understanding. Pt prepped and draped using sterile technique. Pt placed on continuous monitoring. Pt presents comfortable and w/o complaints.Timeout completed with staff present to perform procedure. See charting for all vitals, assessments and medications given.

## 2025-06-12 NOTE — H&P
Interventional Radiology Pre-Procedure History & Physical      Chief Complaint/Reason for Referral: indwelling line    History of Present Illness:  Anna Gabriel is a 24 y.o. female who presents for line removal    Past Medical History:   Diagnosis Date    Anemia     Encounter for blood transfusion     FSGS (focal segmental glomerulosclerosis)     Hypertension     Hyperthyroidism     Kidney transplant status     Kidney Rejection transplant kidney     Past Surgical History:   Procedure Laterality Date    BONE MARROW BIOPSY      INSERTION OF TUNNELED CENTRAL VENOUS HEMODIALYSIS CATHETER Right 5/12/2025    Procedure: Insertion, Catheter, Central Venous, Hemodialysis;  Surgeon: Roni Luna MD;  Location: [facility] CATH LAB;  Service: Interventional Nephrology;  Laterality: Right;    KIDNEY TRANSPLANT      KIDNEY TRANSPLANT N/A 5/7/2025    Procedure: TRANSPLANT, KIDNEY;  Surgeon: Jeff Stern MD;  Location: [facility] OR [address] [place] [address];  Service: Transplant;  Laterality: N/A;    NEPHRECTOMY      Transplant Kidney    PERITONEAL CATHETER REMOVAL         Allergies:   Review of patient's allergies indicates:  No Known Allergies     Home Meds:   Prior to Admission medications    Medication Sig Start Date End Date Taking? Authorizing Provider   aspirin 81 MG Chew Take 81 mg by mouth once daily.  Patient not taking: Reported on 5/21/2025 6/5/23   Provider, Historical   cinacalcet (SENSIPAR) 30 MG Tab Take 1 tablet (30 mg total) by mouth once daily. 5/23/25 5/23/26  Colton Dos Santos Jr., MD   ciprofloxacin HCl (CIPRO) 500 MG tablet Take 1 tablet (500 mg total) by mouth 2 (two) times daily. for 10 days 6/10/25 6/20/25  Quincy Cervantes MD   epoetin alfred-epbx (RETACRIT) 10,000 unit/mL imjection Inject 1 mL (10,000 Units total) into the skin every 7 days. for 3 doses 5/26/25 6/20/25  Colton Dos Santos Jr., MD   famotidine (PEPCID) 20 MG tablet Take 1 tablet (20 mg total) by mouth every evening.  Patient not taking: Reported on 6/9/2025  5/8/25 6/12/25  Jeff Stern MD   heparin sodium,porcine (HEPARIN, PORCINE,) 1,000 unit/mL injection 5,000 Units by Intra-Catheter route as needed. use as directed 2/22/24   Provider, Historical   metoprolol succinate (TOPROL-XL) 50 MG 24 hr tablet Take 1 tablet (50 mg total) by mouth once daily. 6/4/25 6/4/26  Hawa Braun NP   multivitamin Tab Take 1 tablet by mouth once daily. 5/12/25   Jeff Stern MD   mycophenolate (CELLCEPT) 250 mg Cap Take 4 capsules (1,000 mg total) by mouth 2 (two) times daily. 6/4/25   Hawa Braun NP   oxybutynin (DITROPAN) 5 MG Tab Take 1 tablet (5 mg total) by mouth 3 (three) times daily. 5/13/25   Colton Dos Santos Jr., MD   oxyCODONE (ROXICODONE) 10 mg Tab immediate release tablet Take 1/2-1 tablet (5-10 mg total) by mouth every 4 (four) hours as needed for Pain.  Patient not taking: Reported on 6/9/2025 5/12/25   Bibi Simms, DANIEL   predniSONE (DELTASONE) 5 MG tablet Take by mouth daily: 5/9 to 5/15 20 mg, 5/16 to 5/22 15 mg, 5/23 to 5/29 10 mg, 5/30/25 start 5 mg oral daily, DO NOT DISCONTINUE 6/4/25   Hawa Barun NP   sulfamethoxazole-trimethoprim 400-80mg (BACTRIM,SEPTRA) 400-80 mg per tablet Take 1 tablet by mouth once daily. Stop 11/4/2025 6/4/25 11/11/25  Hawa Braun NP   tacrolimus (PROGRAF) 0.5 MG Cap Take 1 capsule (0.5 mg total) by mouth every morning. 6/10/25   Colton Dos Santos Jr., MD   tacrolimus (PROGRAF) 1 MG Cap Take 2 capsules (2 mg total) by mouth every 12 (twelve) hours. 6/10/25   Colton Dos Santos Jr., MD   torsemide (DEMADEX) 100 MG Tab Take 1 tablet (100 mg total) by mouth once daily. 5/16/25 6/15/25  Colton Dos Santos Jr., MD   valGANciclovir (VALCYTE) 450 mg Tab Take 1 tablet (450 mg total) by mouth once daily. Stop 11/4/2025 6/4/25 11/4/25  Hawa Braun NP       Anticoagulation/Antiplatelet Meds: as above    Review of Systems:   Hematological: no known coagulopathies  Respiratory: no shortness of  breath  Cardiovascular: no chest pain  Gastrointestinal: no abdominal pain  Genitourinary: no dysuria  Musculoskeletal: negative  Neurological: no TIA or stroke symptoms     Physical Exam:       General: WNWD, NAD  HEENT: Normocephalic, sclera anicteric, oropharynx clear  Neck: Supple, no palpable lymphadenopathy  Chest: right tunneled line  Heart: RRR  Lungs:  breathing unlabored  Abd: NTND, soft  Extremities:  no CCE on exposed skin  Neuro: Gross nonfocal    Laboratory:  Lab Results   Component Value Date    INR 1.0 05/12/2025       Lab Results   Component Value Date    WBC 5.62 06/12/2025    HGB 10.2 (L) 06/12/2025    HCT 31.1 (L) 06/12/2025    MCV 91 06/12/2025     06/12/2025      Lab Results   Component Value Date     06/12/2025     06/12/2025    K 3.9 06/12/2025     06/12/2025    CO2 19 (L) 06/12/2025    BUN 32 (H) 06/12/2025    CREATININE 2.2 (H) 06/12/2025    CALCIUM 9.8 06/12/2025    MG 1.8 06/12/2025    ALT 16 05/07/2025    AST 16 05/07/2025    ALBUMIN 4.4 06/12/2025    BILITOT 0.4 05/07/2025    BILIDIR 0.2 03/28/2024       Imaging:  Line placement was reviewed.    Assessment/Plan:  24 y.o. female with indwelling HD line with history of renal transplant. Will undergo ilne removal today.    Sedation:  local    Risks (including, but not limited to, pain, bleeding, infection, damage to nearby structures, treatment failure/recurrence, and the need for additional procedures), potential benefits, and alternatives were discussed with the patient. All questions were answered to the best of my abilities. The patient wishes to proceed. Written informed consent was obtained.      Donya Salazar MD

## 2025-06-12 NOTE — Clinical Note
Right: Chest.   Scrubbed with ChloroPrep With Tint.    Hair: N/A.  Skin prep dry before draping.  Prepped by: Rasheeda Mondragon RT 6/12/2025 12:32 PM.

## 2025-06-12 NOTE — DISCHARGE SUMMARY
Radiology Discharge Summary      Hospital Course: No complications    Admit Date: 6/12/2025  Discharge Date: 06/12/2025     Instructions Given to Patient: Yes  Diet: Resume prior diet  Activity: activity as tolerated    Description of Condition on Discharge: Stable  Vital Signs (Most Recent): Temp: 97.3 °F (36.3 °C) (06/12/25 1247)  Pulse: 89 (06/12/25 1247)  Resp: 16 (06/12/25 1247)  BP: 99/67 (06/12/25 1247)  SpO2: 100 % (06/12/25 1247)    Discharge Disposition: Home    Discharge Diagnosis: h/o renal failure     Follow up: As scheduled    Donya Salazar MD  Interventional Radiology

## 2025-06-13 LAB
CYTOMEGALOVIRUS DNA, QUAL (OHS): NOT DETECTED
HBV DNA SERPL NAA+PROBE-ACNC: NORMAL [IU]/ML
HCV RNA SERPL NAA+PROBE-LOG IU: NOT DETECTED {LOG_IU}/ML
HIV1 RNA SERPL NAA+PROBE-LOG#: NOT DETECTED {LOG_COPIES}/ML
TACROLIMUS BLD-MCNC: 7 NG/ML (ref 5–15)

## 2025-06-16 ENCOUNTER — PATIENT MESSAGE (OUTPATIENT)
Dept: INTERVENTIONAL RADIOLOGY/VASCULAR | Facility: CLINIC | Age: 25
End: 2025-06-16
Payer: MEDICARE

## 2025-06-16 ENCOUNTER — RESULTS FOLLOW-UP (OUTPATIENT)
Dept: UROLOGY | Facility: CLINIC | Age: 25
End: 2025-06-16

## 2025-06-16 ENCOUNTER — TELEPHONE (OUTPATIENT)
Dept: UROLOGY | Facility: CLINIC | Age: 25
End: 2025-06-16
Payer: MEDICARE

## 2025-06-16 ENCOUNTER — RESULTS FOLLOW-UP (OUTPATIENT)
Dept: TRANSPLANT | Facility: HOSPITAL | Age: 25
End: 2025-06-16
Payer: MEDICARE

## 2025-06-16 ENCOUNTER — LAB VISIT (OUTPATIENT)
Dept: LAB | Facility: HOSPITAL | Age: 25
End: 2025-06-16
Attending: STUDENT IN AN ORGANIZED HEALTH CARE EDUCATION/TRAINING PROGRAM
Payer: MEDICARE

## 2025-06-16 DIAGNOSIS — N30.90 CYSTITIS: Primary | ICD-10-CM

## 2025-06-16 DIAGNOSIS — Z94.0 KIDNEY REPLACED BY TRANSPLANT: ICD-10-CM

## 2025-06-16 DIAGNOSIS — Z94.0 KIDNEY REPLACED BY TRANSPLANT: Primary | ICD-10-CM

## 2025-06-16 LAB
ABSOLUTE EOSINOPHIL (OHS): 0.06 K/UL
ABSOLUTE MONOCYTE (OHS): 0.19 K/UL (ref 0.3–1)
ABSOLUTE NEUTROPHIL COUNT (OHS): 2.28 K/UL (ref 1.8–7.7)
ALBUMIN SERPL BCP-MCNC: 4.1 G/DL (ref 3.5–5.2)
ANION GAP (OHS): 13 MMOL/L (ref 8–16)
BASOPHILS # BLD AUTO: 0.03 K/UL
BASOPHILS NFR BLD AUTO: 0.9 %
BUN SERPL-MCNC: 41 MG/DL (ref 6–20)
CALCIUM SERPL-MCNC: 9.2 MG/DL (ref 8.7–10.5)
CHLORIDE SERPL-SCNC: 108 MMOL/L (ref 95–110)
CLASS I ANTIBODY COMMENTS - LUMINEX: NORMAL
CLASS II ANTIBODY COMMENTS - LUMINEX: NORMAL
CO2 SERPL-SCNC: 20 MMOL/L (ref 23–29)
CREAT SERPL-MCNC: 2.8 MG/DL (ref 0.5–1.4)
DSA1 TESTING DATE: NORMAL
DSA12 TESTING DATE: NORMAL
DSA2 TESTING DATE: NORMAL
ERYTHROCYTE [DISTWIDTH] IN BLOOD BY AUTOMATED COUNT: 15.4 % (ref 11.5–14.5)
GFR SERPLBLD CREATININE-BSD FMLA CKD-EPI: 23 ML/MIN/1.73/M2
GLUCOSE SERPL-MCNC: 96 MG/DL (ref 70–110)
HCT VFR BLD AUTO: 27.1 % (ref 37–48.5)
HGB BLD-MCNC: 9 GM/DL (ref 12–16)
IMM GRANULOCYTES # BLD AUTO: 0.05 K/UL (ref 0–0.04)
IMM GRANULOCYTES NFR BLD AUTO: 1.5 % (ref 0–0.5)
LYMPHOCYTES # BLD AUTO: 0.79 K/UL (ref 1–4.8)
MAGNESIUM SERPL-MCNC: 1.6 MG/DL (ref 1.6–2.6)
MCH RBC QN AUTO: 30 PG (ref 27–31)
MCHC RBC AUTO-ENTMCNC: 33.2 G/DL (ref 32–36)
MCV RBC AUTO: 90 FL (ref 82–98)
NUCLEATED RBC (/100WBC) (OHS): 0 /100 WBC
PHOSPHATE SERPL-MCNC: 3 MG/DL (ref 2.7–4.5)
PLATELET # BLD AUTO: 313 K/UL (ref 150–450)
PMV BLD AUTO: 8.7 FL (ref 9.2–12.9)
POTASSIUM SERPL-SCNC: 3.4 MMOL/L (ref 3.5–5.1)
RBC # BLD AUTO: 3 M/UL (ref 4–5.4)
RELATIVE EOSINOPHIL (OHS): 1.8 %
RELATIVE LYMPHOCYTE (OHS): 23.2 % (ref 18–48)
RELATIVE MONOCYTE (OHS): 5.6 % (ref 4–15)
RELATIVE NEUTROPHIL (OHS): 67 % (ref 38–73)
SERUM COLLECTION DT - LUMINEX CLASS I: NORMAL
SERUM COLLECTION DT - LUMINEX CLASS II: NORMAL
SODIUM SERPL-SCNC: 141 MMOL/L (ref 136–145)
W BK VIRUS DNA, QUALITATIVE, PLASMA: NOT DETECTED
W BK VIRUS DNA, QUANTITATIVE, PLASMA: <125 COPIES/ML
W LOG BK VIRUS DNA, PLASMA: <2.1 LOG (10) COPIES/ML
WBC # BLD AUTO: 3.4 K/UL (ref 3.9–12.7)

## 2025-06-16 PROCEDURE — 80197 ASSAY OF TACROLIMUS: CPT

## 2025-06-16 PROCEDURE — 82040 ASSAY OF SERUM ALBUMIN: CPT

## 2025-06-16 PROCEDURE — 87799 DETECT AGENT NOS DNA QUANT: CPT

## 2025-06-16 PROCEDURE — 83735 ASSAY OF MAGNESIUM: CPT

## 2025-06-16 PROCEDURE — 85025 COMPLETE CBC W/AUTO DIFF WBC: CPT

## 2025-06-16 PROCEDURE — 36415 COLL VENOUS BLD VENIPUNCTURE: CPT

## 2025-06-16 RX ORDER — ONDANSETRON 4 MG/1
4 TABLET, FILM COATED ORAL EVERY 8 HOURS PRN
Qty: 30 TABLET | Refills: 0 | Status: SHIPPED | OUTPATIENT
Start: 2025-06-16 | End: 2025-07-16

## 2025-06-16 RX ORDER — NITROFURANTOIN 25; 75 MG/1; MG/1
100 CAPSULE ORAL 2 TIMES DAILY
Qty: 14 CAPSULE | Refills: 0 | Status: SHIPPED | OUTPATIENT
Start: 2025-06-16 | End: 2025-06-23

## 2025-06-16 NOTE — TELEPHONE ENCOUNTER
Cystitis  -     nitrofurantoin, macrocrystal-monohydrate, (MACROBID) 100 MG capsule; Take 1 capsule (100 mg total) by mouth 2 (two) times daily. for 7 days  Dispense: 14 capsule; Refill: 0    Quincy,    I talked to her.  She is asymptomatic at present time.  Told to start Macrobid now.  Will proceed with her surgery as scheduled on 6/19.  Will cover her with appropriate IV abx at the time of her surgery.      Thank you

## 2025-06-16 NOTE — TELEPHONE ENCOUNTER
Discussed below with pt. Pt aware of all orders.       Please make sure she is taking the cipro that was prescribed by the urologist. She can take ondansetron 4mg q8h prn for nausea (it can cause constipation so please make sure she is having adequate BM and know is can cause constipation). Duration of prescription 1 month, fill 30.     Colton Dos Santos Jr., MD to [facility] Post-Kidney Transplant Clinical (Selected Message)  6/16/25 10:50 AM  Result Note    Please ask patient to increase hydration. Please consult IR for kidney biopsy this week.    ----- Message from Colton Dos Santos MD sent at 6/16/2025 10:56 AM CDT -----    New dsa detected last week. Creatinine worse. Please ask patient to increase hydration. Make sure she can void without issue. Needs kidney US prior to make sure no new hydro or other vascular   abnormality. If she has sick symptoms, then she needs to go to the hospital. She had that ?UTI? And was sent out with cipro from urology. She still needs the stent out as well.  ----- Message -----  From: Ad Vital MD  Sent: 6/16/2025   9:15 AM CDT  To: Colton Dos Santos Jr., MD      ----- Message -----  From: Lab, Background User  Sent: 6/16/2025   8:27 AM CDT  To: Ad Vital MD

## 2025-06-16 NOTE — TELEPHONE ENCOUNTER
----- Message from Quincy Cervantes sent at 6/16/2025  1:03 PM CDT -----  Dr. Bermudez,     This is the patient we cancelled last week who needs ureteroscopy for her transplant stent removal. I sent her with cipro and she is on standard prophylactic bactrim for transplant but her culture is   only sensitive to Macrobid and IV antibiotics. I was out sick most of Thursday and Friday so I just saw this. Please let me know how you would like to proceed. She is scheduled for the 19th. Thanks!    Quincy Cervantes MD  Urology PGY-3  [facility]     ----- Message -----  From: Lab, Background User  Sent: 6/11/2025  10:29 AM CDT  To: Quincy Cervantes MD

## 2025-06-17 ENCOUNTER — RESULTS FOLLOW-UP (OUTPATIENT)
Dept: TRANSPLANT | Facility: CLINIC | Age: 25
End: 2025-06-17

## 2025-06-17 ENCOUNTER — HOSPITAL ENCOUNTER (OUTPATIENT)
Dept: RADIOLOGY | Facility: HOSPITAL | Age: 25
Discharge: HOME | End: 2025-06-17
Attending: STUDENT IN AN ORGANIZED HEALTH CARE EDUCATION/TRAINING PROGRAM
Payer: MEDICARE

## 2025-06-17 DIAGNOSIS — Z94.0 KIDNEY REPLACED BY TRANSPLANT: ICD-10-CM

## 2025-06-17 LAB — TACROLIMUS BLD-MCNC: 4.3 NG/ML (ref 5–15)

## 2025-06-17 PROCEDURE — 76776 US EXAM K TRANSPL W/DOPPLER: CPT | Mod: TC

## 2025-06-17 PROCEDURE — 76776 US EXAM K TRANSPL W/DOPPLER: CPT | Mod: 26,,, | Performed by: RADIOLOGY

## 2025-06-18 ENCOUNTER — TELEPHONE (OUTPATIENT)
Dept: INTERVENTIONAL RADIOLOGY/VASCULAR | Facility: HOSPITAL | Age: 25
End: 2025-06-18
Payer: MEDICARE

## 2025-06-18 ENCOUNTER — HOSPITAL ENCOUNTER (OUTPATIENT)
Facility: HOSPITAL | Age: 25
Discharge: HOME | End: 2025-06-19
Attending: UROLOGY | Admitting: UROLOGY
Payer: MEDICARE

## 2025-06-18 ENCOUNTER — TELEPHONE (OUTPATIENT)
Dept: TRANSPLANT | Facility: CLINIC | Age: 25
End: 2025-06-18
Payer: MEDICARE

## 2025-06-18 ENCOUNTER — TELEPHONE (OUTPATIENT)
Dept: UROLOGY | Facility: CLINIC | Age: 25
End: 2025-06-18
Payer: MEDICARE

## 2025-06-18 DIAGNOSIS — Z96.0 RETAINED URETERAL STENT: ICD-10-CM

## 2025-06-18 DIAGNOSIS — N18.6 ANEMIA IN CHRONIC KIDNEY DISEASE, ON CHRONIC DIALYSIS: ICD-10-CM

## 2025-06-18 DIAGNOSIS — Z94.0 STATUS POST DECEASED-DONOR KIDNEY TRANSPLANTATION: ICD-10-CM

## 2025-06-18 DIAGNOSIS — D63.1 ANEMIA IN CHRONIC KIDNEY DISEASE, ON CHRONIC DIALYSIS: ICD-10-CM

## 2025-06-18 DIAGNOSIS — Z99.2 ANEMIA IN CHRONIC KIDNEY DISEASE, ON CHRONIC DIALYSIS: ICD-10-CM

## 2025-06-18 RX ORDER — TACROLIMUS 1 MG/1
3 CAPSULE ORAL EVERY 12 HOURS
Qty: 120 CAPSULE | Refills: 11 | Status: SHIPPED | OUTPATIENT
Start: 2025-06-18

## 2025-06-18 NOTE — PRE-PROCEDURE INSTRUCTIONS
Anesthesia Pre-Op Instructions given:     -- YOUR SURGEON'S OFFICE WILL PROVIDE YOUR ARRIVAL TIME  -- Medication information (what to hold and what to take)   -- NPO guidelines as follows: (or as per your Surgeon)  Stop ALL solid food, gum, candy 8 hours before arrival time.  Stop all CLOUDY liquids: coffee with creamer, cloudy juices, 8 hours prior to arrival time.  The patient should be ENCOURAGED to drink carbohydrate-rich clear liquids (sports drinks, clear juices) until 2 hours prior to arrival time.  Stop clear liquids 2 hours prior to arrival time.  CLEAR liquids include water, black coffee NO creamer, clear oral rehydration drinks, clear sports drinks and clear fruit juices (no orange juice, no pulpy juices, no apple cider).   IF IN DOUBT, drink water instead.   NOTHING TO DRINK 2 hours before surgery/procedure time. If you are told to take medication on the morning of surgery, it may be taken with a sip of water.   -- *Arrival place and directions given*.  Time to be given the day before procedure by the Surgeon's Office   -- Bathe with antibacterial soap (dial or Hibiclens as instructed)  -- Don't wear any jewelry or valuables and not metals on skin or hair AM of surgery   -- No makeup or moisturizer to face   -- No perfume/cologne, powder, lotions, aftershave or deodorant     Pt verbalized understanding.            *If going to , see below:      Directions and Instructions for [facility]   At [facility], we have an outstanding team of physicians, anesthesiologists, CRNAs, Registered Nurses, Surgical Technologists, and other ancillary team members all focused on your surgical and procedural care.   Before Your Procedure:   The physician's office will call you with a specific arrival time and directions a day or two before your scheduled procedure. You may also receive these instructions through your [facility] portal.   Day of Procedure:   Please be sure to arrive at  the arrival time given or you may risk your surgery being delayed or canceled. The arrival time is earlier than your scheduled surgery or procedure time. In the winter months please dress warm and bring blankets for you or your child as the waiting room may be cold. If you have difficulty locating the facility, please give us a call at [phone].   Directions:   The [facility] is located on the 1st floor of the hospital building near the [address] [place] entrance.   Parking:   You will park in the South Parking Garage (note location on map). [facility] opens at 5:00 a.m. and has a drop off area by the entrance.  parking is available starting at 7:00 a.m. Please see below for further  parking instructions.   Directions from the parking garage elevators   Blue [facility] Elevators: From the parking garage, take the blue [employer] elevators (located in the center of the parking garage) to the 1st floor of the garage. You will then take a right once off the elevators then another right to the outside of the parking garage. You will be across from the [facility]. You will walk down the sidewalk, pass the  curve at the [place] entrance and continue to follow the sidewalk. You will pass the radiation oncology entrance on your right. Continue to follow the sidewalk to the [facility] glass door entrance.   Hospital Entrance (Inside Route): If a mostly inside route is preferred: Take the inside elevator bank (located at the far north end of the garage) from the parking garage to the 1st floor. On the 1st floor walk past [employer]. Keep walking down the center of the hallway towards the hospital elevators. Once you reach the red brick alistair, take a left and go past the hospital elevators. Take another left and follow the blue and white [employer] [employer] signs around the hallway to the end. Go outside of the door. You will see the [facility]  Center entrance to your right.   Drop Off:   There is a drop off area at the doors of the [facility] for your convenience. If utilized for pediatric patients, an adult must accompany the patient into the surgery center while another adult jimenez the vehicle.    (at 7:00 a.m.):   Upon check-in, please let the  know that you are utilizing [facility] parking which is free. The . will then call [facility] for your car to be picked up. Your keys and phone number will be collected and given to [facility] services. You will then be given a ticket. Upon discharge, [facility] will be notified to bring your vehicle back when you are ready.           Directions to [facility] [place]:  [phone]     From 1st floor garage elevators: go past [facility] [employer] shop. Look for Black piano on side of coffee shop. Take Atrium (gold) elevator by piano up to 2nd floor. When you exit elevator follow long hallway (you should see a sign hanging from the ceiling that says Day of Surgery Family Waiting Room. When hallway ends you will be entering the day of surgery waiting area. Check in at the desk for your procedure.      From [facility] entrance: Make a right after you enter the door to the hospital. On your Left, take Concourse elevator to 2nd floor. Check in at desk      From Lab desk on 2nd floor: Exit lab area toward hospital atrium. You should see a sign that says Day of Surgery Family Waiting Area. Make a Left and follow long hallway (you should see a sign hanging from the ceiling that says Day of Surgery Family Waiting Room. When hallway ends you will be entering the day of surgery waiting area. Check in at the desk for your procedure.

## 2025-06-18 NOTE — NURSING
Pre-procedure call complete.  2 patient identifier used (name and ).      No food after midnight.  You may drink clear liquids (sports drinks, clear juices) until 2 hours before arrival time.  Clear liquids include only water, black coffee (no creamer), clear oral rehydration drinks, clear sports drinks and clear fruit juices.  Clear liquids do NOT include anything with pulp or food particles (chicken broth, ice cream, yogurt, jello, etc).  NO orange juice, pulpy juices, or apple cider.  If you can read newsprint through the liquid, it qualifies as clear.  IF UNSURE, drink water instead.      Have NOTHING BY MOUTH 2 hours before arrival time.  Medication the morning of your procedure may be taken with a sip of water     Pt aware will need someone to provide transport home and monitor pt 8 hours post procedure.  No driving for at least 24 hours after procedure.   Patient advised to take blood pressure, heart medications,  and anti-rejections medications with a sip of water morning of procedure.  Patient verbalized aware of which medications to take.  Do not take  sleep medication (including OTC) and anxiety medication the night before procedure.  Arrival time and location given.  Expected length of stay reviewed.  Covid screening completed.  Pt verbalized understanding of all pre-procedure instructions.  Written instructions and directions sent to patient in Mychart/portal.

## 2025-06-19 ENCOUNTER — ANESTHESIA EVENT (OUTPATIENT)
Dept: SURGERY | Facility: HOSPITAL | Age: 25
End: 2025-06-19
Payer: MEDICARE

## 2025-06-19 ENCOUNTER — ANESTHESIA (OUTPATIENT)
Dept: SURGERY | Facility: HOSPITAL | Age: 25
End: 2025-06-19
Payer: MEDICARE

## 2025-06-19 VITALS
TEMPERATURE: 99 F | DIASTOLIC BLOOD PRESSURE: 73 MMHG | HEIGHT: 62 IN | RESPIRATION RATE: 16 BRPM | WEIGHT: 130.81 LBS | OXYGEN SATURATION: 99 % | HEART RATE: 69 BPM | BODY MASS INDEX: 24.07 KG/M2 | SYSTOLIC BLOOD PRESSURE: 109 MMHG

## 2025-06-19 LAB
W BK VIRUS DNA, QUALITATIVE, PLASMA: NOT DETECTED
W BK VIRUS DNA, QUANTITATIVE, PLASMA: <125 COPIES/ML
W LOG BK VIRUS DNA, PLASMA: <2.1 LOG (10) COPIES/ML

## 2025-06-19 PROCEDURE — 53899 UNLISTED PX URINARY SYSTEM: CPT | Mod: ,,, | Performed by: UROLOGY

## 2025-06-19 PROCEDURE — 36000707: Performed by: UROLOGY

## 2025-06-19 PROCEDURE — 63600175 PHARM REV CODE 636 W HCPCS

## 2025-06-19 PROCEDURE — 37000009 HC ANESTHESIA EA ADD 15 MINS: Performed by: UROLOGY

## 2025-06-19 PROCEDURE — 71000044 HC DOSC ROUTINE RECOVERY FIRST HOUR: Performed by: UROLOGY

## 2025-06-19 PROCEDURE — 36000706: Performed by: UROLOGY

## 2025-06-19 PROCEDURE — C1769 GUIDE WIRE: HCPCS | Performed by: UROLOGY

## 2025-06-19 PROCEDURE — 37000008 HC ANESTHESIA 1ST 15 MINUTES: Performed by: UROLOGY

## 2025-06-19 PROCEDURE — 25000003 PHARM REV CODE 250: Performed by: NURSE ANESTHETIST, CERTIFIED REGISTERED

## 2025-06-19 PROCEDURE — 27200651 HC AIRWAY, LMA: Performed by: ANESTHESIOLOGY

## 2025-06-19 PROCEDURE — 71000015 HC POSTOP RECOV 1ST HR: Performed by: UROLOGY

## 2025-06-19 PROCEDURE — C1889 IMPLANT/INSERT DEVICE, NOC: HCPCS | Performed by: UROLOGY

## 2025-06-19 PROCEDURE — 63600175 PHARM REV CODE 636 W HCPCS: Performed by: NURSE ANESTHETIST, CERTIFIED REGISTERED

## 2025-06-19 RX ORDER — PHENYLEPHRINE HYDROCHLORIDE 10 MG/ML
INJECTION INTRAVENOUS
Status: DISCONTINUED | OUTPATIENT
Start: 2025-06-19 | End: 2025-06-19

## 2025-06-19 RX ORDER — MEROPENEM 1 G/1
1 INJECTION, POWDER, FOR SOLUTION INTRAVENOUS
Status: COMPLETED | OUTPATIENT
Start: 2025-06-19 | End: 2025-06-19

## 2025-06-19 RX ORDER — ONDANSETRON HYDROCHLORIDE 2 MG/ML
4 INJECTION, SOLUTION INTRAVENOUS DAILY PRN
Status: DISCONTINUED | OUTPATIENT
Start: 2025-06-19 | End: 2025-06-19 | Stop reason: HOSPADM

## 2025-06-19 RX ORDER — LEVOFLOXACIN 5 MG/ML
750 INJECTION, SOLUTION INTRAVENOUS
Status: DISCONTINUED | OUTPATIENT
Start: 2025-06-19 | End: 2025-06-19 | Stop reason: HOSPADM

## 2025-06-19 RX ORDER — PROPOFOL 10 MG/ML
VIAL (ML) INTRAVENOUS
Status: DISCONTINUED | OUTPATIENT
Start: 2025-06-19 | End: 2025-06-19

## 2025-06-19 RX ORDER — FENTANYL CITRATE 50 UG/ML
25 INJECTION, SOLUTION INTRAMUSCULAR; INTRAVENOUS EVERY 5 MIN PRN
Refills: 0 | Status: DISCONTINUED | OUTPATIENT
Start: 2025-06-19 | End: 2025-06-19 | Stop reason: HOSPADM

## 2025-06-19 RX ORDER — FENTANYL CITRATE 50 UG/ML
INJECTION, SOLUTION INTRAMUSCULAR; INTRAVENOUS
Status: DISCONTINUED | OUTPATIENT
Start: 2025-06-19 | End: 2025-06-19

## 2025-06-19 RX ORDER — ONDANSETRON HYDROCHLORIDE 2 MG/ML
INJECTION, SOLUTION INTRAVENOUS
Status: DISCONTINUED | OUTPATIENT
Start: 2025-06-19 | End: 2025-06-19

## 2025-06-19 RX ORDER — SODIUM CHLORIDE 0.9 % (FLUSH) 0.9 %
10 SYRINGE (ML) INJECTION
Status: DISCONTINUED | OUTPATIENT
Start: 2025-06-19 | End: 2025-06-19 | Stop reason: HOSPADM

## 2025-06-19 RX ORDER — MIDAZOLAM HYDROCHLORIDE 1 MG/ML
INJECTION INTRAMUSCULAR; INTRAVENOUS
Status: DISCONTINUED | OUTPATIENT
Start: 2025-06-19 | End: 2025-06-19

## 2025-06-19 RX ORDER — GLUCAGON 1 MG
1 KIT INJECTION
Status: DISCONTINUED | OUTPATIENT
Start: 2025-06-19 | End: 2025-06-19 | Stop reason: HOSPADM

## 2025-06-19 RX ORDER — LIDOCAINE HYDROCHLORIDE 20 MG/ML
INJECTION INTRAVENOUS
Status: DISCONTINUED | OUTPATIENT
Start: 2025-06-19 | End: 2025-06-19

## 2025-06-19 RX ADMIN — MEROPENEM 1 G: 1 INJECTION INTRAVENOUS at 09:06

## 2025-06-19 RX ADMIN — MIDAZOLAM HYDROCHLORIDE 2 MG: 2 INJECTION, SOLUTION INTRAMUSCULAR; INTRAVENOUS at 09:06

## 2025-06-19 RX ADMIN — ONDANSETRON 4 MG: 2 INJECTION INTRAMUSCULAR; INTRAVENOUS at 09:06

## 2025-06-19 RX ADMIN — PROPOFOL 200 MG: 10 INJECTION, EMULSION INTRAVENOUS at 09:06

## 2025-06-19 RX ADMIN — PHENYLEPHRINE HYDROCHLORIDE 150 MCG: 10 INJECTION INTRAVENOUS at 09:06

## 2025-06-19 RX ADMIN — FENTANYL CITRATE 50 MCG: 50 INJECTION, SOLUTION INTRAMUSCULAR; INTRAVENOUS at 09:06

## 2025-06-19 RX ADMIN — SODIUM CHLORIDE: 0.9 INJECTION, SOLUTION INTRAVENOUS at 08:06

## 2025-06-19 RX ADMIN — LIDOCAINE HYDROCHLORIDE 80 MG: 20 INJECTION INTRAVENOUS at 09:06

## 2025-06-19 RX ADMIN — PHENYLEPHRINE HYDROCHLORIDE 200 MCG: 10 INJECTION INTRAVENOUS at 09:06

## 2025-06-19 NOTE — TRANSFER OF CARE
"Anesthesia Transfer of Care Note    Patient: Anna Gabriel    Procedure(s) Performed: Procedure(s) (LRB):  CYSTOSCOPY, WITH URETERAL STENT REMOVAL (Right)    Patient location: PACU    Anesthesia Type: general    Transport from OR: Transported from OR on 6-10 L/min O2 by face mask with adequate spontaneous ventilation    Post pain: adequate analgesia    Post assessment: tolerated procedure well and no apparent anesthetic complications    Post vital signs: stable    Level of consciousness: sedated    Nausea/Vomiting: no nausea/vomiting    Complications: none    Transfer of care protocol was followedComments: Oral airway in place      Last vitals: Visit Vitals  /65 (BP Location: Left arm, Patient Position: Lying)   Pulse 62   Temp 36.3 °C (97.3 °F) (Temporal)   Resp 16   Ht 5' 2" (1.575 m)   Wt 59.4 kg (130 lb 13.5 oz)   SpO2 100%   Breastfeeding No   BMI 23.93 kg/m²     "

## 2025-06-19 NOTE — PROGRESS NOTES
Plan of care reviewed with parent, she verbalized understanding, pt progressing with plan of care, denies nausea & pain, tolerating PO, reviewed all DC instructions,when to call MD, when to follow-up, answered questions.

## 2025-06-19 NOTE — ANESTHESIA PROCEDURE NOTES
Intubation    Date/Time: 6/19/2025 9:19 AM    Performed by: Kelly Lafleur CRNA  Authorized by: Mara Mathew MD    Intubation:     Induction:  Intravenous    Intubated:  Postinduction    Mask Ventilation:  Easy mask    Attempts:  1    Attempted By:  CRNA    Difficult Airway Encountered?: No      Complications:  None    Airway Device:  Supraglottic airway/LMA    Airway Device Size:  3.0    Secured at:  The lips    Placement Verified By:  Capnometry    Complicating Factors:  None    Findings Post-Intubation:  BS equal bilateral and atraumatic/condition of teeth unchanged

## 2025-06-19 NOTE — DISCHARGE INSTRUCTIONS
Post Cystoscopy Instructions  Do not strain to have a bowel movement  No strenuous exercise x 7 days  No driving while you are on narcotic pain medications or if your lyon  catheter is in place    You can expect:  To pass stone fragments if you had a stone procedure  Have pain when you void from your stent if you have a stent in place  See blood in your urine if you have a stent in place    If you have a catheter, please return to the ER if your catheter stops draining or you are having abdominal pain.    Call the doctor if:  Temperature is greater than 101F  Persistent vomiting and inability to keep food down  Inability to void if you do not have a catheter    Please take three more days of your antibiotics as prescribed.

## 2025-06-19 NOTE — INTERVAL H&P NOTE
The patient has been examined and the H&P has been reviewed:    I concur with the findings and no changes have occurred since H&P was written.    Anesthesia/Surgery risks, benefits and alternative options discussed and understood by patient/family.    Has been on antibiotics as prescribed. Denies any n/v/f/c/dysuria today.       There are no hospital problems to display for this patient.

## 2025-06-19 NOTE — ANESTHESIA PREPROCEDURE EVALUATION
06/19/2025  Anna Gabriel is a 24 y.o., female.      Pre-op Assessment    I have reviewed the Patient Summary Reports.     I have reviewed the Nursing Notes. I have reviewed the NPO Status.      Review of Systems  Anesthesia Hx:             Denies Family Hx of Anesthesia complications.    Denies Personal Hx of Anesthesia complications.                      Physical Exam  General: Well nourished    Airway:  Mallampati: I / I  Mouth Opening: Normal  Tongue: Normal  Neck ROM: Normal ROM    Dental:  Intact    Chest/Lungs:  Clear to auscultation    Heart:  Rate: Normal    Abdomen:  Normal    Anesthesia Plan  Type of Anesthesia, risks & benefits discussed:    Anesthesia Type: Gen ETT  Intra-op Monitoring Plan: Standard ASA Monitors  Post Op Pain Control Plan: multimodal analgesia  Induction:  IV  Airway Plan: Direct  Informed Consent: Informed consent signed with the Patient and all parties understand the risks and agree with anesthesia plan.  All questions answered. Patient consented to blood products? No  ASA Score: 1  Day of Surgery Review of History & Physical: H&P Update referred to the surgeon/provider.    Ready For Surgery From Anesthesia Perspective.   .

## 2025-06-19 NOTE — ANESTHESIA POSTPROCEDURE EVALUATION
Anesthesia Post Evaluation    Patient: Anna Gabriel    Procedure(s) Performed: Procedure(s) (LRB):  CYSTOSCOPY, WITH URETERAL STENT REMOVAL (Right)    Final Anesthesia Type: general      Patient location during evaluation: PACU  Patient participation: Yes- Able to Participate  Level of consciousness: awake and alert  Post-procedure vital signs: reviewed and not stable  Airway patency: patent    PONV status at discharge: No PONV  Anesthetic complications: no      Cardiovascular status: blood pressure returned to baseline  Respiratory status: unassisted  Hydration status: euvolemic  Follow-up not needed.          Vitals Value Taken Time   /73 06/19/25 10:45   Temp 37.1 °C (98.8 °F) 06/19/25 10:45   Pulse 69 06/19/25 10:45   Resp 16 06/19/25 10:45   SpO2 99 % 06/19/25 10:30         No case tracking events are documented in the log.      Pain/Nain Score: Pain Rating Prior to Med Admin: 0 (6/19/2025 10:16 AM)  Pain Rating Post Med Admin: 0 (6/19/2025 10:16 AM)  Nain Score: 10 (6/19/2025 10:53 AM)

## 2025-06-19 NOTE — BRIEF OP NOTE
Reggie Britton - Surgery ([facility])  Brief Operative Note    Surgery Date: 6/19/2025     Surgeons and Role:     * Oliver Bermudez MD - Primary     * Yassine Jacobs MD - Resident - Assisting     * Fidel Iverson MD - Resident - Assisting        Pre-op Diagnosis:  Retained ureteral stent of transplanted kidney [T86.19, T83.89XA]    Post-op Diagnosis:  Post-Op Diagnosis Codes:     * Retained ureteral stent of transplanted kidney [T86.19, T83.89XA]    Procedure(s) (LRB):  CYSTOSCOPY, WITH URETERAL STENT REMOVAL (Right)    Anesthesia: General    Operative Findings: stent seen in right ureter, semirigid ureteroscopy performed and stent extract with NSnare. No complications.    Estimated Blood Loss: min         Specimens:   Specimen (24h ago, onward)      None            * No specimens in log *        Discharge Note    OUTCOME: Patient tolerated treatment/procedure well without complication and is now ready for discharge.    DISPOSITION: Home or Self Care    FINAL DIAGNOSIS:  same as above    FOLLOWUP: In clinic    DISCHARGE INSTRUCTIONS:  patient can be discharged home

## 2025-06-19 NOTE — OP NOTE
[facility]  Operative Note    Date: 2025    Pre-Op Diagnosis: retained right transplant stent    Patient Active Problem List    Diagnosis Date Noted    Retained ureteral stent 06/10/2025    Hypotension 2025    Delayed graft function of kidney transplant due to ATN requiring acute dialysis 2025    Status post -donor kidney transplantation 2025    At risk for opportunistic infections 2025    Immunosuppression due to drug therapy 2025    Prophylactic immunotherapy 2025    Hyperglycemia 2025    Adrenal cortical steroids causing adverse effect in therapeutic use 2025    Hypoalbuminemia 2025    Failed kidney transplant x2 2024    Hyperparathyroidism due to ESRD on dialysis 2024    UTI (urinary tract infection) 2017    Gastritis 2017    Chronic rejection of renal transplant 2017    FSGS (focal segmental glomerulosclerosis) 2017    Anemia in chronic kidney disease, on chronic dialysis 2017    Megacolon due to infectious colitis 2017    Benign hypertension with ESRD (end-stage renal disease) 2017    C. difficile colitis 2017    Non compliance with medical treatment 2017       Post-Op Diagnosis: same    Procedure(s) Performed:   1. Right ureteroscopy  2. Cystoscopy  3. Extraction of right retained ureteral stent  4. Fluoro < 1 hr    Specimen(s): none    Staff Surgeon: Oliver Bermudez MD    Assistant Surgeon: Fidel Iverson MD,     Anesthesia: LMA    Indications: Anna Gabriel is a 24 y.o. female with a retained right ureteral transplant stent    Findings: Stent seen in right ureter. Distal tip of stent terminating in right distal ureter. Grasped with NSnare, extracted, no complications. Stent placed likely too short for ureteral length.     Estimated Blood Loss: min    Drains: none    Procedure in detail:  After risks, benefits, and possible complications were explained, the  patient elected to undergo the procedure and informed consent was obtained. All questions were answered in the courtney-operative area. The patient was transferred to the cystoscopy suite and placed in the supine position. SCDs were applied and working. Anesthesia was administered. The patient was then placed in the dorsal lithotomy position and prepped and draped in the usual sterile fashion. Time out was performed, and courtney-procedural antibiotics were confirmed.     A  was taken at first showing the right ureteral stent in place with a coil in the kidney but terminating in the ureter with no coil in bladder. A rigid cystoscope in a 22 Fr sheath was introduced into the patient's urethra. This passed easily. The entire urethra was visualized which showed no strictures or masses.     A motion wire was passed up the right ureteral orifice and up into the kidney. This passed easily and placement was confirmed fluoroscopically. The wire was seen in the same ureter as the stent. The cystoscope was removed keeping the wire in place.    An 8 Fr rigid ureteroscope was passed into the patient's bladder alongside the wire under direct vision. It was then passed through the right ureteral orifice alongside the wire. The distal tip of the stent was seen in the right distal ureter. An NSnare basket was inserted through the scope and the stent was grasped and removed under live fluoroscopy. The stent was inspected and was fully intact. The motion wire was removed and the case was terminated.     The patient tolerated the procedure well and was transferred to the recovery room in stable condition.    Disposition:  The patient will follow up with transplant surgery. She will continue three more days of her antibiotics as prescribed.     Fidel Iverson MD

## 2025-06-20 ENCOUNTER — HOSPITAL ENCOUNTER (OUTPATIENT)
Dept: INTERVENTIONAL RADIOLOGY/VASCULAR | Facility: HOSPITAL | Age: 25
Discharge: HOME | End: 2025-06-20
Attending: STUDENT IN AN ORGANIZED HEALTH CARE EDUCATION/TRAINING PROGRAM
Payer: COMMERCIAL

## 2025-06-20 VITALS
OXYGEN SATURATION: 100 % | SYSTOLIC BLOOD PRESSURE: 102 MMHG | HEIGHT: 62 IN | RESPIRATION RATE: 14 BRPM | BODY MASS INDEX: 23.9 KG/M2 | HEART RATE: 90 BPM | WEIGHT: 129.88 LBS | DIASTOLIC BLOOD PRESSURE: 59 MMHG | TEMPERATURE: 98 F

## 2025-06-20 DIAGNOSIS — Z94.0 KIDNEY REPLACED BY TRANSPLANT: ICD-10-CM

## 2025-06-20 LAB
B-HCG UR QL: NEGATIVE
CTP QC/QA: YES
INR PPP: 1 (ref 0.8–1.2)
PROTHROMBIN TIME: 10.9 SECONDS (ref 9–12.5)

## 2025-06-20 PROCEDURE — 99152 MOD SED SAME PHYS/QHP 5/>YRS: CPT | Performed by: STUDENT IN AN ORGANIZED HEALTH CARE EDUCATION/TRAINING PROGRAM

## 2025-06-20 PROCEDURE — 88305 TISSUE EXAM BY PATHOLOGIST: CPT | Performed by: STUDENT IN AN ORGANIZED HEALTH CARE EDUCATION/TRAINING PROGRAM

## 2025-06-20 PROCEDURE — 99152 MOD SED SAME PHYS/QHP 5/>YRS: CPT | Mod: ,,, | Performed by: STUDENT IN AN ORGANIZED HEALTH CARE EDUCATION/TRAINING PROGRAM

## 2025-06-20 PROCEDURE — 63600175 PHARM REV CODE 636 W HCPCS: Mod: TB

## 2025-06-20 PROCEDURE — 63600175 PHARM REV CODE 636 W HCPCS: Performed by: STUDENT IN AN ORGANIZED HEALTH CARE EDUCATION/TRAINING PROGRAM

## 2025-06-20 PROCEDURE — 50200 RENAL BIOPSY PERQ: CPT | Mod: RT | Performed by: STUDENT IN AN ORGANIZED HEALTH CARE EDUCATION/TRAINING PROGRAM

## 2025-06-20 PROCEDURE — 81025 URINE PREGNANCY TEST: CPT | Performed by: FAMILY MEDICINE

## 2025-06-20 PROCEDURE — 76942 ECHO GUIDE FOR BIOPSY: CPT | Mod: TC | Performed by: STUDENT IN AN ORGANIZED HEALTH CARE EDUCATION/TRAINING PROGRAM

## 2025-06-20 PROCEDURE — 25000003 PHARM REV CODE 250

## 2025-06-20 PROCEDURE — 85610 PROTHROMBIN TIME: CPT | Performed by: FAMILY MEDICINE

## 2025-06-20 PROCEDURE — 76942 ECHO GUIDE FOR BIOPSY: CPT | Mod: 26,,, | Performed by: STUDENT IN AN ORGANIZED HEALTH CARE EDUCATION/TRAINING PROGRAM

## 2025-06-20 RX ORDER — LIDOCAINE HYDROCHLORIDE 10 MG/ML
1 INJECTION, SOLUTION EPIDURAL; INFILTRATION; INTRACAUDAL; PERINEURAL ONCE
Status: DISCONTINUED | OUTPATIENT
Start: 2025-06-20 | End: 2025-06-21 | Stop reason: HOSPADM

## 2025-06-20 RX ORDER — FENTANYL CITRATE 50 UG/ML
INJECTION, SOLUTION INTRAMUSCULAR; INTRAVENOUS
Status: COMPLETED | OUTPATIENT
Start: 2025-06-20 | End: 2025-06-20

## 2025-06-20 RX ORDER — MIDAZOLAM HYDROCHLORIDE 1 MG/ML
INJECTION, SOLUTION INTRAMUSCULAR; INTRAVENOUS
Status: COMPLETED | OUTPATIENT
Start: 2025-06-20 | End: 2025-06-20

## 2025-06-20 RX ORDER — SODIUM CHLORIDE 9 MG/ML
INJECTION, SOLUTION INTRAVENOUS CONTINUOUS
Status: DISCONTINUED | OUTPATIENT
Start: 2025-06-20 | End: 2025-06-21 | Stop reason: HOSPADM

## 2025-06-20 RX ADMIN — DESMOPRESSIN ACETATE 8.8 MCG: 4 SOLUTION INTRAVENOUS at 02:06

## 2025-06-20 RX ADMIN — FENTANYL CITRATE 50 MCG: 50 INJECTION, SOLUTION INTRAMUSCULAR; INTRAVENOUS at 03:06

## 2025-06-20 RX ADMIN — MIDAZOLAM HYDROCHLORIDE 1 MG: 2 INJECTION, SOLUTION INTRAMUSCULAR; INTRAVENOUS at 03:06

## 2025-06-20 RX ADMIN — MIDAZOLAM HYDROCHLORIDE 0.5 MG: 2 INJECTION, SOLUTION INTRAMUSCULAR; INTRAVENOUS at 03:06

## 2025-06-20 RX ADMIN — MIDAZOLAM HYDROCHLORIDE 1 MG: 2 INJECTION, SOLUTION INTRAMUSCULAR; INTRAVENOUS at 02:06

## 2025-06-20 NOTE — NURSING
Pt recovering in mpu for 2 hrs post transplant kidney bx. Site CDI. Received report from RUBÉN Logan. See flowsheets for assessment and vs.

## 2025-06-20 NOTE — H&P
Radiology History & Physical      SUBJECTIVE:     Chief Complaint: transplant kidney dysfunction    History of Present Illness:  Anna Gabriel is a 24 y.o. female who presents for transplant kidney biopsy.    Past Medical History:   Diagnosis Date    Anemia     Encounter for blood transfusion     FSGS (focal segmental glomerulosclerosis)     Hypertension     Hyperthyroidism     Kidney transplant status     Kidney Rejection transplant kidney     Past Surgical History:   Procedure Laterality Date    BONE MARROW BIOPSY      CYSTOSCOPY W/ URETERAL STENT REMOVAL Right 6/19/2025    Procedure: CYSTOSCOPY, WITH URETERAL STENT REMOVAL;  Surgeon: Oliver Bermudez MD;  Location: [facility] OR 1ST FLR;  Service: Urology;  Laterality: Right;    INSERTION OF TUNNELED CENTRAL VENOUS HEMODIALYSIS CATHETER Right 5/12/2025    Procedure: Insertion, Catheter, Central Venous, Hemodialysis;  Surgeon: Roni Luna MD;  Location: [facility] CATH LAB;  Service: Interventional Nephrology;  Laterality: Right;    KIDNEY TRANSPLANT      KIDNEY TRANSPLANT N/A 5/7/2025    Procedure: TRANSPLANT, KIDNEY;  Surgeon: Jeff Stern MD;  Location: [facility] OR 2ND FLR;  Service: Transplant;  Laterality: N/A;    NEPHRECTOMY      Transplant Kidney    PERITONEAL CATHETER REMOVAL         Home Meds:   Prior to Admission medications    Medication Sig Start Date End Date Taking? Authorizing Provider   cinacalcet (SENSIPAR) 30 MG Tab Take 1 tablet (30 mg total) by mouth once daily. 5/23/25 5/23/26 Yes Colton Dos Santos Jr., MD   multivitamin Tab Take 1 tablet by mouth once daily. 5/12/25  Yes Jeff Stern MD   mycophenolate (CELLCEPT) 250 mg Cap Take 4 capsules (1,000 mg total) by mouth 2 (two) times daily. 6/4/25  Yes Hawa Braun NP   nitrofurantoin, macrocrystal-monohydrate, (MACROBID) 100 MG capsule Take 1 capsule (100 mg total) by mouth 2 (two) times daily. for 7 days 6/16/25 6/23/25 Yes Oliver Bermudez MD   oxybutynin (DITROPAN) 5 MG Tab Take 1 tablet (5  mg total) by mouth 3 (three) times daily. 5/13/25  Yes Colton Dos Santos Jr., MD   predniSONE (DELTASONE) 5 MG tablet Take by mouth daily: 5/9 to 5/15 20 mg, 5/16 to 5/22 15 mg, 5/23 to 5/29 10 mg, 5/30/25 start 5 mg oral daily, DO NOT DISCONTINUE 6/4/25  Yes Hawa Braun NP   sulfamethoxazole-trimethoprim 400-80mg (BACTRIM,SEPTRA) 400-80 mg per tablet Take 1 tablet by mouth once daily. Stop 11/4/2025 6/4/25 11/11/25 Yes Hawa Braun NP   tacrolimus (PROGRAF) 1 MG Cap Take 3 capsules (3 mg total) by mouth every 12 (twelve) hours. 6/18/25  Yes Colton Dos Santos Jr., MD   valGANciclovir (VALCYTE) 450 mg Tab Take 1 tablet (450 mg total) by mouth once daily. Stop 11/4/2025 6/4/25 11/4/25 Yes Hawa Braun NP   aspirin 81 MG Chew Take 81 mg by mouth once daily.  Patient not taking: Reported on 5/21/2025 6/5/23   Provider, Historical   ciprofloxacin HCl (CIPRO) 500 MG tablet Take 1 tablet (500 mg total) by mouth 2 (two) times daily. for 10 days  Patient not taking: Reported on 6/18/2025 6/10/25 6/20/25  Quincy Cervantes MD   epoetin alfred-epbx (RETACRIT) 10,000 unit/mL imjection Inject 1 mL (10,000 Units total) into the skin every 7 days. for 4 doses 6/18/25 7/18/25  Colton Dos Santos Jr., MD   famotidine (PEPCID) 20 MG tablet Take 1 tablet (20 mg total) by mouth every evening. 5/8/25 6/19/25  Jeff Stern MD   heparin sodium,porcine (HEPARIN, PORCINE,) 1,000 unit/mL injection 5,000 Units by Intra-Catheter route as needed. use as directed 2/22/24   Provider, Historical   metoprolol succinate (TOPROL-XL) 50 MG 24 hr tablet Take 1 tablet (50 mg total) by mouth once daily. 6/4/25 6/4/26  Hawa Braun, NP   ondansetron (ZOFRAN) 4 MG tablet Take 1 tablet (4 mg total) by mouth every 8 (eight) hours as needed for Nausea. 6/16/25 7/16/25  Colton Dos Santos Jr., MD   oxyCODONE (ROXICODONE) 10 mg Tab immediate release tablet Take 1/2-1 tablet (5-10 mg total) by mouth every 4 (four) hours as  needed for Pain. 5/12/25   Bibi Simms, DANIEL   torsemide (DEMADEX) 100 MG Tab Take 1 tablet (100 mg total) by mouth once daily. 5/16/25 6/18/25  Colton Dos Santos Jr., MD     Anticoagulants/Antiplatelets: reviewed    Allergies: Review of patient's allergies indicates:  No Known Allergies  Sedation History:  no adverse reactions    Review of Systems:   Hematological: no known coagulopathies  Respiratory: no shortness of breath  Cardiovascular: no chest pain  Gastrointestinal: no abdominal pain  Genito-Urinary: no dysuria  Musculoskeletal: negative  Neurological: no TIA or stroke symptoms         OBJECTIVE:     Vital Signs (Most Recent)  Temp: 97.9 °F (36.6 °C) (06/20/25 1329)  Pulse: 89 (06/20/25 1329)  Resp: 18 (06/20/25 1329)  BP: 117/73 (06/20/25 1329)  SpO2: 100 % (06/20/25 1329)    Physical Exam:  ASA: 2  Mallampati: 1    General: no acute distress  Mental Status: alert and oriented to person, place and time  HEENT: normocephalic, atraumatic  Chest: unlabored breathing  Abdomen: nondistended  Extremity: moves all extremities    Laboratory  Lab Results   Component Value Date    INR 1.0 06/20/2025       Lab Results   Component Value Date    WBC 3.40 (L) 06/16/2025    HGB 9.0 (L) 06/16/2025    HCT 27.1 (L) 06/16/2025    MCV 90 06/16/2025     06/16/2025      Lab Results   Component Value Date    GLU 96 06/16/2025     06/16/2025    K 3.4 (L) 06/16/2025     06/16/2025    CO2 20 (L) 06/16/2025    BUN 41 (H) 06/16/2025    CREATININE 2.8 (H) 06/16/2025    CALCIUM 9.2 06/16/2025    MG 1.6 06/16/2025    ALT 16 05/07/2025    AST 16 05/07/2025    ALBUMIN 4.1 06/16/2025    BILITOT 0.4 05/07/2025    BILIDIR 0.2 03/28/2024       ASSESSMENT/PLAN:     Sedation Plan: up to moderate.  Patient will undergo transplant kidney biopsy.    Rodríguez Scott MD  Department of Radiology, PGY-3

## 2025-06-20 NOTE — PLAN OF CARE
Patient arrived to room. PIV placed, labs sent. Admit assessment completed. Plan of care discussed with patient. Bed locked and in low position. Side rails raised x 2. Nurse call bell within reach. Will monitor

## 2025-06-20 NOTE — BRIEF OP NOTE
Radiology Post-Procedure Note    Pre Op Diagnosis: elevated creatinine    Post Op Diagnosis: same    Procedure: transplant kidney biopsy     Procedure performed by: Donya Salazar MD    Written Informed Consent Obtained: Yes    Specimen Removed: YES     Estimated Blood Loss: less than 50     Findings:   Using US guidance a 17 g sheath needle was placed into the transplant kidney. 18g biopsy device used to take 3 core biopsy samples. Specimen sent to pathology.     Patient tolerated procedure well.    Donya Salazar MD  Interventional Radiology

## 2025-06-20 NOTE — DISCHARGE INSTRUCTIONS
KIDNEY BIOPSY DISCHARGE EDUCATION:    Information:   A kidney (renal) biopsy is a procedure in which a biopsy needle is used to remove small amounts of kidney tissue to evaluate for kidney function and/or if there is a kidney mass to evaluate if it is cancerous versus benign (non-cancerous).     What should I expect after the Kidney Biopsy?    You may have some blood in your urine after the procedure, this should resolve in a few days.    There may be some soreness around the biopsy site.    You may return to work after 24 hours unless your primary doctor instructs you otherwise.    You do not have any diet restrictions because of this procedure but should continue any that were given to you by any other doctors.    Continue all previously prescribed medications with the exception of Coumadin/warfarin which should be resumed after 24 hours. Pradaxa, Xarelto, Eliquis or Savaysa can be resumed after 48 hours.     Bathing & Wound Care:    You may shower after 24 hours; do not tub bath or submerge in water for 3 days (bath tub, hot tub, swimming pool, river or any other body of water).    Dressing to stay on 2-3 days (clean and dry)    If the biopsy site(s) become red, tender, swollen, or starts to drain, contact us.    Avoid heavy lifting and strenuous activity for 3 days.     Follow-up visit information:   Your ordering physician will typically get your biopsy results in three to five business days. If you do not hear from the ordering physician in 7 business days, please call his/her office to set up an appointment to review the results. Follow up with Interventional Radiology is not usually necessary.       Occasionally, a situation will require prompt attention and an emergency room visit is necessary:    Sudden chest pain, shortness of breath, or fainting    Increasing blood in your urine    Increasing redness, swelling or drainage from the biopsy site    Increasing pain not relieved by medication    Bleeding or  drainage from the needle site that is saturating the dressing    You have shaking, chills and/or a temperature over 100.3°F    New, sudden difficulty breathing    Drop in blood pressure, and/or light-headed feeling    Interventional Radiology Clinic   For complications   [phone]. Monday - Friday, 8:00 am - 4:00 pm    [phone] After hours and on holidays. Ask to speak with the interventional radiologist on call.     For Scheduling   [phone] Monday - Friday, 8:00 am - 4:00 pm

## 2025-06-20 NOTE — NURSING
Pt fully recovered. Pt verbalized understanding of d/c instructions. Pt being transported to meet ride.

## 2025-06-23 ENCOUNTER — OFFICE VISIT (OUTPATIENT)
Dept: TRANSPLANT | Facility: CLINIC | Age: 25
End: 2025-06-23
Payer: MEDICARE

## 2025-06-23 ENCOUNTER — LAB VISIT (OUTPATIENT)
Dept: LAB | Facility: HOSPITAL | Age: 25
End: 2025-06-23
Attending: INTERNAL MEDICINE
Payer: MEDICARE

## 2025-06-23 VITALS
BODY MASS INDEX: 24.75 KG/M2 | WEIGHT: 134.5 LBS | OXYGEN SATURATION: 100 % | SYSTOLIC BLOOD PRESSURE: 120 MMHG | HEART RATE: 84 BPM | RESPIRATION RATE: 18 BRPM | DIASTOLIC BLOOD PRESSURE: 73 MMHG | HEIGHT: 62 IN | TEMPERATURE: 98 F

## 2025-06-23 DIAGNOSIS — N05.1 FSGS (FOCAL SEGMENTAL GLOMERULOSCLEROSIS): ICD-10-CM

## 2025-06-23 DIAGNOSIS — D84.821 IMMUNOSUPPRESSION DUE TO DRUG THERAPY: ICD-10-CM

## 2025-06-23 DIAGNOSIS — E87.20 METABOLIC ACIDOSIS: ICD-10-CM

## 2025-06-23 DIAGNOSIS — Z94.0 KIDNEY REPLACED BY TRANSPLANT: ICD-10-CM

## 2025-06-23 DIAGNOSIS — Z79.899 IMMUNOSUPPRESSION DUE TO DRUG THERAPY: ICD-10-CM

## 2025-06-23 DIAGNOSIS — Z94.0 STATUS POST DECEASED-DONOR KIDNEY TRANSPLANTATION: Primary | ICD-10-CM

## 2025-06-23 DIAGNOSIS — T86.19 DELAYED GRAFT FUNCTION OF KIDNEY TRANSPLANT DUE TO ATN REQUIRING ACUTE DIALYSIS: ICD-10-CM

## 2025-06-23 DIAGNOSIS — Z99.2 DELAYED GRAFT FUNCTION OF KIDNEY TRANSPLANT DUE TO ATN REQUIRING ACUTE DIALYSIS: ICD-10-CM

## 2025-06-23 LAB
ABSOLUTE EOSINOPHIL (OHS): 0.02 K/UL
ABSOLUTE MONOCYTE (OHS): 0.17 K/UL (ref 0.3–1)
ABSOLUTE NEUTROPHIL COUNT (OHS): 2.78 K/UL (ref 1.8–7.7)
ALBUMIN SERPL BCP-MCNC: 3.7 G/DL (ref 3.5–5.2)
ANION GAP (OHS): 7 MMOL/L (ref 8–16)
BASOPHILS # BLD AUTO: 0.03 K/UL
BASOPHILS NFR BLD AUTO: 0.8 %
BUN SERPL-MCNC: 16 MG/DL (ref 6–20)
CALCIUM SERPL-MCNC: 9.2 MG/DL (ref 8.7–10.5)
CHLORIDE SERPL-SCNC: 118 MMOL/L (ref 95–110)
CO2 SERPL-SCNC: 15 MMOL/L (ref 23–29)
CREAT SERPL-MCNC: 1.1 MG/DL (ref 0.5–1.4)
ERYTHROCYTE [DISTWIDTH] IN BLOOD BY AUTOMATED COUNT: 16.5 % (ref 11.5–14.5)
GFR SERPLBLD CREATININE-BSD FMLA CKD-EPI: >60 ML/MIN/1.73/M2
GLUCOSE SERPL-MCNC: 93 MG/DL (ref 70–110)
HCT VFR BLD AUTO: 26.8 % (ref 37–48.5)
HGB BLD-MCNC: 8.6 GM/DL (ref 12–16)
IMM GRANULOCYTES # BLD AUTO: 0.02 K/UL (ref 0–0.04)
IMM GRANULOCYTES NFR BLD AUTO: 0.5 % (ref 0–0.5)
LYMPHOCYTES # BLD AUTO: 0.89 K/UL (ref 1–4.8)
MAGNESIUM SERPL-MCNC: 1.8 MG/DL (ref 1.6–2.6)
MCH RBC QN AUTO: 30.1 PG (ref 27–31)
MCHC RBC AUTO-ENTMCNC: 32.1 G/DL (ref 32–36)
MCV RBC AUTO: 94 FL (ref 82–98)
NUCLEATED RBC (/100WBC) (OHS): 0 /100 WBC
PHOSPHATE SERPL-MCNC: 1.8 MG/DL (ref 2.7–4.5)
PLATELET # BLD AUTO: 340 K/UL (ref 150–450)
PMV BLD AUTO: 8.3 FL (ref 9.2–12.9)
POTASSIUM SERPL-SCNC: 4.7 MMOL/L (ref 3.5–5.1)
RBC # BLD AUTO: 2.86 M/UL (ref 4–5.4)
RELATIVE EOSINOPHIL (OHS): 0.5 %
RELATIVE LYMPHOCYTE (OHS): 22.8 % (ref 18–48)
RELATIVE MONOCYTE (OHS): 4.3 % (ref 4–15)
RELATIVE NEUTROPHIL (OHS): 71.1 % (ref 38–73)
SODIUM SERPL-SCNC: 140 MMOL/L (ref 136–145)
WBC # BLD AUTO: 3.91 K/UL (ref 3.9–12.7)

## 2025-06-23 PROCEDURE — 36415 COLL VENOUS BLD VENIPUNCTURE: CPT

## 2025-06-23 PROCEDURE — 80197 ASSAY OF TACROLIMUS: CPT

## 2025-06-23 PROCEDURE — 80069 RENAL FUNCTION PANEL: CPT

## 2025-06-23 PROCEDURE — 99215 OFFICE O/P EST HI 40 MIN: CPT | Mod: S$PBB,,, | Performed by: NURSE PRACTITIONER

## 2025-06-23 PROCEDURE — 85025 COMPLETE CBC W/AUTO DIFF WBC: CPT

## 2025-06-23 PROCEDURE — 99999 PR PBB SHADOW E&M-EST. PATIENT-LVL III: CPT | Mod: PBBFAC,,, | Performed by: NURSE PRACTITIONER

## 2025-06-23 PROCEDURE — 99213 OFFICE O/P EST LOW 20 MIN: CPT | Mod: PBBFAC | Performed by: NURSE PRACTITIONER

## 2025-06-23 PROCEDURE — 83735 ASSAY OF MAGNESIUM: CPT

## 2025-06-23 RX ORDER — SODIUM BICARBONATE 650 MG/1
1300 TABLET ORAL 2 TIMES DAILY
Qty: 120 TABLET | Refills: 11 | Status: SHIPPED | OUTPATIENT
Start: 2025-06-23 | End: 2026-06-23

## 2025-06-23 NOTE — LETTER
June 23, 2025        [facility]  [address]  [place] [address]  Phone: [phone]  Fax: [phone]             Reg[facility] Buchanan- Transplant 1st Fl  [address]  [facility] [address]  Phone: [phone]   Patient: Anna Gabriel   MR Number: [id number]   YOB: 2000   Date of Visit: 6/23/2025       Dear Dr. Chris Velasquez    Thank you for referring Anna Gabriel to me for evaluation. Attached you will find relevant portions of my assessment and plan of care.    If you have questions, please do not hesitate to call me. I look forward to following Anna Gabriel along with you.    Sincerely,    Hawa Braun, NP    Enclosure    If you would like to receive this communication electronically, please contact [email or web address] or [phone] to request [employer] access.    [employer] is a tool which provides read-only access to select patient information with whom you have a relationship. Its easy to use and provides real time access to review your patients record including encounter summaries, notes, results, and demographic information.    If you feel you have received this communication in error or would no longer like to receive these types of communications, please e-mail [email or web address]

## 2025-06-23 NOTE — PROGRESS NOTES
"    Kidney Post-Transplant Assessment    Referring Physician: Bryan Wells  Current Nephrologist: Chris Velasquez    ORGAN: LEFT KIDNEY  Donor Type: donation after brain death  PHS Increased Risk: no  Cold Ischemia: 1,511 mins  Induction Medications: thymo    Subjective:     CC:  Reassessment of renal allograft function and management of chronic immunosuppression.    HPI:  Ms. Gabriel is a 24 y.o. year old White female who received a donation after brain death kidney transplant on 5/7/25. Her most recent creatinine is 1.1. She takes mycophenolate mofetil, prednisone, and tacrolimus for maintenance immunosuppression. Her post transplant course has been complicated by delayed graft function requiring dialysis.    Transplant Specifics  ESRD 2/2 re-transplant/FSGS  SCD, KDPI 49%, cPRA 100%  Thymo induction, CIT>24hrs  CMV D+,R- (MISMATCH)     Medical Issues  Kidney #1 2015  Kidney #2 2020  FSGS protocol  cPRA 100%- CXM (-), historical DSA+, Cw7(1586), DQB1*06:04(3071)   Ureteral stent 4 weeks      Improvement in Cr 1.1---today  Had stent removed last week  Drinking 1.5L of fluids a day, with good UOP  Reports BP 120s,   No edema    Review of Systems   Allergic/Immunologic: Positive for immunocompromised state.       Objective:   Blood pressure 120/73, pulse 84, temperature 97.7 °F (36.5 °C), temperature source Temporal, resp. rate 18, height 5' 2" (1.575 m), weight 61 kg (134 lb 7.7 oz), SpO2 100%.body mass index is 24.6 kg/m².    Physical Exam  Constitutional:       General: She is not in acute distress.     Appearance: She is well-developed. She is not diaphoretic.   Cardiovascular:      Rate and Rhythm: Normal rate and regular rhythm.      Heart sounds: Normal heart sounds.   Pulmonary:      Effort: Pulmonary effort is normal.      Breath sounds: Normal breath sounds.   Abdominal:      General: Bowel sounds are normal.      Palpations: Abdomen is soft.   Musculoskeletal:         General: No tenderness. " "Normal range of motion.   Skin:     General: Skin is warm and dry.      Findings: No rash.      Nails: There is no clubbing.   Neurological:      Mental Status: She is alert and oriented to person, place, and time.   Psychiatric:         Behavior: Behavior normal.         Labs:  Lab Results   Component Value Date    WBC 3.91 2025    HGB 8.6 (L) 2025    HCT 26.8 (L) 2025     2025    K 4.7 2025     (H) 2025    CO2 15 (L) 2025    BUN 16 2025    CREATININE 1.1 2025    EGFRNORACEVR >60 2025    CALCIUM 9.2 2025    PHOS 1.8 (L) 2025    MG 1.8 2025    ALBUMIN 3.7 2025    AST 16 2025    ALT 16 2025    UTPCR 0.34 (H) 2025    .5 (H) 05/15/2025    TACROLIMUS 4.3 (L) 2025       No results found for: "EXTANC", "EXTWBC", "EXTSEGS", "EXTPLATELETS", "EXTHEMOGLOBI", "EXTHEMATOCRI", "EXTCREATININ", "EXTSODIUM", "EXTPOTASSIUM", "EXTBUN", "EXTCO2", "EXTCALCIUM", "EXTPHOSPHORU", "EXTGLUCOSE", "EXTALBUMIN", "EXTAST", "EXTALT", "EXTBILITOTAL", "EXTLIPASE", "EXTAMYLASE"    No results found for: "EXTCYCLOSLVL", "EXTSIROLIMUS", "EXTTACROLVL", "EXTPROTCRE", "EXTPTHINTACT", "EXTPROTEINUA", "EXTWBCUA", "EXTRBCUA"    Labs were reviewed with the patient    Assessment:     1. Status post -donor kidney transplantation    2. Delayed graft function of kidney transplant due to ATN requiring acute dialysis    3. FSGS (focal segmental glomerulosclerosis)    4. Immunosuppression due to drug therapy    5. Metabolic acidosis        Plan:   BP and heart rate have improved.  Co2 15---- started NaBicarb 1300mg BID  Pending biopsy results from 25    1. CKD stage: will continue follow up as per our center guidelines. patient to continue close follow up with the local General nephrologist. Education provided in appropriate fluid intake, potassium intake. Continue with oral hydration.      2. Immunosuppression: Prograf trough " "8.7, therapeutic target 8-10. Continue Prograf 1.5/1.5, [id number] Mg BID, and Prednisone  QD  Lab Results   Component Value Date    TACROLIMUS 4.3 (L) 06/16/2025    TACROLIMUS 7.0 06/12/2025    TACROLIMUS 6.1 06/09/2025   Will closely monitor for toxicities, education provided about adherence to medicines and need to communicate any side effect to the transplant nurse or physician.    3. Allograft Function:stable at baseline for the patient. Continue follow up as per our guidelines and with the local General nephrologist. Communication will be sent today.  Lab Results   Component Value Date    CREATININE 1.1 06/23/2025    CREATININE 2.8 (H) 06/16/2025    CREATININE 2.2 (H) 06/12/2025     No results found for: "AMYLASE", "LIPASE"    4. Hypertension management:  Continue with home blood pressure monitoring, low salt and healthy life discussed with the patient.  BP Readings from Last 3 Encounters:   06/23/25 120/73   06/20/25 (!) 102/59   06/19/25 109/73   MTP 50mg Daily     5. Metabolic Bone Disease/Secondary Hyperparathyroidism:calcium and phosphorus level discussed with the patient, patient will continue follow up with the general nephrologist for management of metabolic bone disease calcium and phosphorus as per our center protocol. Will monitor PTH, Vit D level, calcium.   Lab Results   Component Value Date    .5 (H) 05/15/2025    CALCIUM 9.2 06/23/2025    PHOS 1.8 (L) 06/23/2025    PHOS 3.0 06/16/2025    PHOS 2.4 (L) 06/12/2025       6. Electrolytes: reviewed with the patient, essentially within the normal range no need for acute changes today, will monitor as per our center guidelines.  Lab Results   Component Value Date     06/23/2025    K 4.7 06/23/2025     (H) 06/23/2025    CO2 15 (L) 06/23/2025    CO2 20 (L) 06/16/2025    CO2 19 (L) 06/12/2025       7. Anemia: will continue monitoring as per our center guidelines. No indication for acute intervention today.  Lab Results   Component Value " "Date    WBC 3.91 06/23/2025    HGB 8.6 (L) 06/23/2025    HCT 26.8 (L) 06/23/2025    MCV 94 06/23/2025     06/23/2025       8.Proteinuria: will continue with pr/cr ratio as per our center guidelines  Lab Results   Component Value Date    PROTEINURINE 24 (H) 06/09/2025    CREATRANDUR 70.0 06/09/2025    UTPCR 0.34 (H) 06/09/2025    UTPCR 7.09 (H) 05/15/2025    UTPCR 2.08 (H) 05/10/2025      Upcr 5/19/25  1.35 High    Improving, will continue FSGS protocol     9. BK virus infection screening: will continue with urine or blood PCR as per our guidelines to prevent BK virus viremia and allograft dysfunction  No results found for: "BKVIRUSDNAUR", "BKQUANTURINE", "BKVIRUSLOG", "BKVIRUSURINE"      10. Weight education: provided during the clinic visit.  Body mass index is 24.6 kg/m².     11.Patient safety education regarding immunosuppression including prophylaxis posttransplant for CMV, PCP : Education provided about vaccination and prevention of infections.    12.  Cytopenias: no significant cytopenias will monitor as per our guidelines. Medicine list reviewed including potential causes of drug-induced cytopenias  Lab Results   Component Value Date    WBC 3.91 06/23/2025    HGB 8.6 (L) 06/23/2025    HCT 26.8 (L) 06/23/2025    MCV 94 06/23/2025     06/23/2025       13. Post-transplant Prophylaxis; CMV Infection, PJP and Candida mucosistis and other indicated for this particular patient.   Bactrim and Valcyte      Exercise: reminded Ricardo of the importance of regular exercise for weight management, blood sugar and blood pressure management.  I also explained exercise has been shown to improve cardiovascular health, energy level, and sleep hygiene.  Lastly, I advised him that cardiovascular complications are leading cause of death for renal transplant recipients, and regular exercise can help lower this risk.    Follow-up:   Clinic: return to transplant clinic weekly for the first month after transplant; every " 2 weeks during months 2-3; then at 6-, 9-, 12-, 18-, 24-, and 36- months post-transplant to reassess for complications from immunosuppression toxicity and monitor for rejection.  Annually thereafter.    Labs: since patient remains at high risk for rejection and drug-related complications that warrant close monitoring, labs will be ordered as follows: continue twice weekly CBC, renal panel, and drug level for first month; then same labs once weekly through 3rd month post-transplant.  Urine for UA and protein/creatinine ratio monthly.  Serum BK - PCR at 1-, 3-, 6-, 9-, 12-, 18-, 24-, 36-, 48-, and 60 months post-transplant.  Hepatic panel at 1-, 2-, 3-, 6-, 9-, 12-, 18-, 24-, and 36- months post-transplant.    Hawa Braun NP       Education:   Material provided to the patient.  Patient reminded to call with any health changes since these can be early signs of significant complications.  Also, I advised the patient to be sure any new medications or changes of old medications are discussed with either a pharmacist or physician knowledgeable with transplant to avoid rejection/drug toxicity related to significant drug interactions.    Patient advised that it is recommended that all transplanted patients, and their close contacts and household members receive Covid vaccination.

## 2025-06-24 ENCOUNTER — TELEPHONE (OUTPATIENT)
Dept: TRANSPLANT | Facility: CLINIC | Age: 25
End: 2025-06-24
Payer: MEDICARE

## 2025-06-24 DIAGNOSIS — Z94.0 STATUS POST DECEASED-DONOR KIDNEY TRANSPLANTATION: ICD-10-CM

## 2025-06-24 LAB — TACROLIMUS BLD-MCNC: 6.3 NG/ML (ref 5–15)

## 2025-06-24 RX ORDER — TACROLIMUS 1 MG/1
4 CAPSULE ORAL EVERY 12 HOURS
Qty: 120 CAPSULE | Refills: 11 | Status: SHIPPED | OUTPATIENT
Start: 2025-06-24 | End: 2025-06-27

## 2025-06-25 LAB
ESTROGEN SERPL-MCNC: NORMAL PG/ML
INSULIN SERPL-ACNC: NORMAL U[IU]/ML
LAB AP CLINICAL INFORMATION: NORMAL
LAB AP GROSS DESCRIPTION: NORMAL
LAB AP PERFORMING LOCATION(S): NORMAL
LAB AP REPORT FOOTNOTES: NORMAL

## 2025-06-27 DIAGNOSIS — Z94.0 STATUS POST DECEASED-DONOR KIDNEY TRANSPLANTATION: ICD-10-CM

## 2025-06-27 RX ORDER — TACROLIMUS 1 MG/1
4 CAPSULE ORAL EVERY 12 HOURS
Qty: 240 CAPSULE | Refills: 11 | Status: SHIPPED | OUTPATIENT
Start: 2025-06-27

## 2025-06-30 ENCOUNTER — RESULTS FOLLOW-UP (OUTPATIENT)
Dept: TRANSPLANT | Facility: HOSPITAL | Age: 25
End: 2025-06-30
Payer: COMMERCIAL

## 2025-06-30 ENCOUNTER — LAB VISIT (OUTPATIENT)
Dept: LAB | Facility: HOSPITAL | Age: 25
End: 2025-06-30
Attending: STUDENT IN AN ORGANIZED HEALTH CARE EDUCATION/TRAINING PROGRAM
Payer: MEDICARE

## 2025-06-30 DIAGNOSIS — Z94.0 STATUS POST DECEASED-DONOR KIDNEY TRANSPLANTATION: ICD-10-CM

## 2025-06-30 DIAGNOSIS — Z94.0 KIDNEY REPLACED BY TRANSPLANT: ICD-10-CM

## 2025-06-30 LAB
ABSOLUTE EOSINOPHIL (OHS): 0.02 K/UL
ABSOLUTE MONOCYTE (OHS): 0.26 K/UL (ref 0.3–1)
ABSOLUTE NEUTROPHIL COUNT (OHS): 2.11 K/UL (ref 1.8–7.7)
ALBUMIN SERPL BCP-MCNC: 3.7 G/DL (ref 3.5–5.2)
ANION GAP (OHS): 7 MMOL/L (ref 8–16)
BASOPHILS # BLD AUTO: 0.03 K/UL
BASOPHILS NFR BLD AUTO: 0.9 %
BUN SERPL-MCNC: 25 MG/DL (ref 6–20)
CALCIUM SERPL-MCNC: 8.9 MG/DL (ref 8.7–10.5)
CHLORIDE SERPL-SCNC: 116 MMOL/L (ref 95–110)
CO2 SERPL-SCNC: 15 MMOL/L (ref 23–29)
CREAT SERPL-MCNC: 1 MG/DL (ref 0.5–1.4)
ERYTHROCYTE [DISTWIDTH] IN BLOOD BY AUTOMATED COUNT: 16.7 % (ref 11.5–14.5)
GFR SERPLBLD CREATININE-BSD FMLA CKD-EPI: >60 ML/MIN/1.73/M2
GLUCOSE SERPL-MCNC: 93 MG/DL (ref 70–110)
HCT VFR BLD AUTO: 28.6 % (ref 37–48.5)
HGB BLD-MCNC: 9 GM/DL (ref 12–16)
IMM GRANULOCYTES # BLD AUTO: 0.03 K/UL (ref 0–0.04)
IMM GRANULOCYTES NFR BLD AUTO: 0.9 % (ref 0–0.5)
LYMPHOCYTES # BLD AUTO: 0.96 K/UL (ref 1–4.8)
MAGNESIUM SERPL-MCNC: 1.6 MG/DL (ref 1.6–2.6)
MCH RBC QN AUTO: 30.3 PG (ref 27–31)
MCHC RBC AUTO-ENTMCNC: 31.5 G/DL (ref 32–36)
MCV RBC AUTO: 96 FL (ref 82–98)
NUCLEATED RBC (/100WBC) (OHS): 0 /100 WBC
PHOSPHATE SERPL-MCNC: 2.6 MG/DL (ref 2.7–4.5)
PLATELET # BLD AUTO: 351 K/UL (ref 150–450)
PMV BLD AUTO: 8.3 FL (ref 9.2–12.9)
POTASSIUM SERPL-SCNC: 4.6 MMOL/L (ref 3.5–5.1)
RBC # BLD AUTO: 2.97 M/UL (ref 4–5.4)
RELATIVE EOSINOPHIL (OHS): 0.6 %
RELATIVE LYMPHOCYTE (OHS): 28.2 % (ref 18–48)
RELATIVE MONOCYTE (OHS): 7.6 % (ref 4–15)
RELATIVE NEUTROPHIL (OHS): 61.8 % (ref 38–73)
SODIUM SERPL-SCNC: 138 MMOL/L (ref 136–145)
WBC # BLD AUTO: 3.41 K/UL (ref 3.9–12.7)

## 2025-06-30 PROCEDURE — 80197 ASSAY OF TACROLIMUS: CPT

## 2025-06-30 PROCEDURE — 85025 COMPLETE CBC W/AUTO DIFF WBC: CPT

## 2025-06-30 PROCEDURE — 36415 COLL VENOUS BLD VENIPUNCTURE: CPT

## 2025-06-30 PROCEDURE — 83735 ASSAY OF MAGNESIUM: CPT

## 2025-06-30 PROCEDURE — 82040 ASSAY OF SERUM ALBUMIN: CPT

## 2025-07-01 LAB — TACROLIMUS BLD-MCNC: 11.7 NG/ML (ref 5–15)

## 2025-07-01 RX ORDER — TACROLIMUS 1 MG/1
CAPSULE ORAL
Qty: 240 CAPSULE | Refills: 11 | Status: SHIPPED | OUTPATIENT
Start: 2025-07-01

## 2025-07-07 ENCOUNTER — LAB VISIT (OUTPATIENT)
Dept: LAB | Facility: HOSPITAL | Age: 25
End: 2025-07-07
Attending: INTERNAL MEDICINE
Payer: MEDICARE

## 2025-07-07 ENCOUNTER — OFFICE VISIT (OUTPATIENT)
Dept: TRANSPLANT | Facility: CLINIC | Age: 25
End: 2025-07-07
Payer: MEDICARE

## 2025-07-07 VITALS
BODY MASS INDEX: 25.19 KG/M2 | WEIGHT: 136.88 LBS | SYSTOLIC BLOOD PRESSURE: 111 MMHG | HEIGHT: 62 IN | TEMPERATURE: 99 F | HEART RATE: 65 BPM | DIASTOLIC BLOOD PRESSURE: 83 MMHG

## 2025-07-07 DIAGNOSIS — I12.0 BENIGN HYPERTENSION WITH ESRD (END-STAGE RENAL DISEASE): ICD-10-CM

## 2025-07-07 DIAGNOSIS — T86.12 FAILED KIDNEY TRANSPLANT: ICD-10-CM

## 2025-07-07 DIAGNOSIS — Z94.0 KIDNEY REPLACED BY TRANSPLANT: ICD-10-CM

## 2025-07-07 DIAGNOSIS — D84.821 IMMUNOSUPPRESSION DUE TO DRUG THERAPY: ICD-10-CM

## 2025-07-07 DIAGNOSIS — N18.6 BENIGN HYPERTENSION WITH ESRD (END-STAGE RENAL DISEASE): ICD-10-CM

## 2025-07-07 DIAGNOSIS — Z94.0 STATUS POST DECEASED-DONOR KIDNEY TRANSPLANTATION: Primary | ICD-10-CM

## 2025-07-07 DIAGNOSIS — Z79.899 IMMUNOSUPPRESSION DUE TO DRUG THERAPY: ICD-10-CM

## 2025-07-07 LAB
ABSOLUTE EOSINOPHIL (OHS): 0.03 K/UL
ABSOLUTE MONOCYTE (OHS): 0.25 K/UL (ref 0.3–1)
ABSOLUTE NEUTROPHIL COUNT (OHS): 2.05 K/UL (ref 1.8–7.7)
ALBUMIN SERPL BCP-MCNC: 3.9 G/DL (ref 3.5–5.2)
ANION GAP (OHS): 8 MMOL/L (ref 8–16)
BASOPHILS # BLD AUTO: 0.03 K/UL
BASOPHILS NFR BLD AUTO: 1 %
BUN SERPL-MCNC: 17 MG/DL (ref 6–20)
CALCIUM SERPL-MCNC: 9.3 MG/DL (ref 8.7–10.5)
CHLORIDE SERPL-SCNC: 114 MMOL/L (ref 95–110)
CO2 SERPL-SCNC: 18 MMOL/L (ref 23–29)
CREAT SERPL-MCNC: 1.2 MG/DL (ref 0.5–1.4)
ERYTHROCYTE [DISTWIDTH] IN BLOOD BY AUTOMATED COUNT: 16.7 % (ref 11.5–14.5)
GFR SERPLBLD CREATININE-BSD FMLA CKD-EPI: >60 ML/MIN/1.73/M2
GLUCOSE SERPL-MCNC: 93 MG/DL (ref 70–110)
HCT VFR BLD AUTO: 28.2 % (ref 37–48.5)
HGB BLD-MCNC: 8.7 GM/DL (ref 12–16)
IMM GRANULOCYTES # BLD AUTO: 0.02 K/UL (ref 0–0.04)
IMM GRANULOCYTES NFR BLD AUTO: 0.6 % (ref 0–0.5)
LYMPHOCYTES # BLD AUTO: 0.77 K/UL (ref 1–4.8)
MAGNESIUM SERPL-MCNC: 1.5 MG/DL (ref 1.6–2.6)
MCH RBC QN AUTO: 30 PG (ref 27–31)
MCHC RBC AUTO-ENTMCNC: 30.9 G/DL (ref 32–36)
MCV RBC AUTO: 97 FL (ref 82–98)
NUCLEATED RBC (/100WBC) (OHS): 0 /100 WBC
PHOSPHATE SERPL-MCNC: 2.2 MG/DL (ref 2.7–4.5)
PLATELET # BLD AUTO: 312 K/UL (ref 150–450)
PMV BLD AUTO: 8.2 FL (ref 9.2–12.9)
POTASSIUM SERPL-SCNC: 4.7 MMOL/L (ref 3.5–5.1)
RBC # BLD AUTO: 2.9 M/UL (ref 4–5.4)
RELATIVE EOSINOPHIL (OHS): 1 %
RELATIVE LYMPHOCYTE (OHS): 24.4 % (ref 18–48)
RELATIVE MONOCYTE (OHS): 7.9 % (ref 4–15)
RELATIVE NEUTROPHIL (OHS): 65.1 % (ref 38–73)
SODIUM SERPL-SCNC: 140 MMOL/L (ref 136–145)
WBC # BLD AUTO: 3.15 K/UL (ref 3.9–12.7)

## 2025-07-07 PROCEDURE — 82040 ASSAY OF SERUM ALBUMIN: CPT

## 2025-07-07 PROCEDURE — 86977 RBC SERUM PRETX INCUBJ/INHIB: CPT | Mod: 59 | Performed by: STUDENT IN AN ORGANIZED HEALTH CARE EDUCATION/TRAINING PROGRAM

## 2025-07-07 PROCEDURE — 86833 HLA CLASS II HIGH DEFIN QUAL: CPT | Performed by: STUDENT IN AN ORGANIZED HEALTH CARE EDUCATION/TRAINING PROGRAM

## 2025-07-07 PROCEDURE — 99213 OFFICE O/P EST LOW 20 MIN: CPT | Mod: PBBFAC | Performed by: NURSE PRACTITIONER

## 2025-07-07 PROCEDURE — 80197 ASSAY OF TACROLIMUS: CPT

## 2025-07-07 PROCEDURE — 99999 PR PBB SHADOW E&M-EST. PATIENT-LVL III: CPT | Mod: PBBFAC,,, | Performed by: NURSE PRACTITIONER

## 2025-07-07 PROCEDURE — 36415 COLL VENOUS BLD VENIPUNCTURE: CPT

## 2025-07-07 PROCEDURE — 87799 DETECT AGENT NOS DNA QUANT: CPT

## 2025-07-07 PROCEDURE — 86832 HLA CLASS I HIGH DEFIN QUAL: CPT | Performed by: STUDENT IN AN ORGANIZED HEALTH CARE EDUCATION/TRAINING PROGRAM

## 2025-07-07 PROCEDURE — 83735 ASSAY OF MAGNESIUM: CPT

## 2025-07-07 PROCEDURE — 85025 COMPLETE CBC W/AUTO DIFF WBC: CPT

## 2025-07-07 RX ORDER — OXYBUTYNIN CHLORIDE 5 MG/1
5 TABLET ORAL 3 TIMES DAILY
Qty: 90 TABLET | Refills: 1 | Status: SHIPPED | OUTPATIENT
Start: 2025-07-07

## 2025-07-07 NOTE — LETTER
July 7, 2025        [facility]  [address]  [place] [address]  Phone: [phone]  Fax: [phone]             [facility]- Transplant 1st Fl  [address]  [facility] [address]  Phone: [phone]   Patient: Anna Gabriel   MR Number: [id number]   YOB: 2000   Date of Visit: 7/7/2025       Dear Dr. Chris Velasquez    Thank you for referring Anna Gabriel to me for evaluation. Attached you will find relevant portions of my assessment and plan of care.    If you have questions, please do not hesitate to call me. I look forward to following Anna Gabriel along with you.    Sincerely,    Hawa Braun, NP    Enclosure    If you would like to receive this communication electronically, please contact [email or web address] or [phone] to request [employer] access.    [employer] is a tool which provides read-only access to select patient information with whom you have a relationship. Its easy to use and provides real time access to review your patients record including encounter summaries, notes, results, and demographic information.    If you feel you have received this communication in error or would no longer like to receive these types of communications, please e-mail [email or web address]

## 2025-07-07 NOTE — PROGRESS NOTES
"    Kidney Post-Transplant Assessment    Referring Physician: Bryan Wells  Current Nephrologist: Chris Velasquez    ORGAN: LEFT KIDNEY  Donor Type: donation after brain death  PHS Increased Risk: no  Cold Ischemia: 1,511 mins  Induction Medications: thymo    Subjective:     CC:  Reassessment of renal allograft function and management of chronic immunosuppression.    HPI:  Ms. Gabriel is a 24 y.o. year old White female who received a donation after brain death kidney transplant on 5/7/25. Her most recent creatinine is 1.1. She takes mycophenolate mofetil, prednisone, and tacrolimus for maintenance immunosuppression. Her post transplant course has been complicated by delayed graft function requiring dialysis.    Transplant Specifics  ESRD 2/2 re-transplant/FSGS  SCD, KDPI 49%, cPRA 100%  Thymo induction, CIT>24hrs  CMV D+,R- (MISMATCH)     Medical Issues  Kidney #1 2015  Kidney #2 2020  FSGS protocol  cPRA 100%- CXM (-), historical DSA+, Cw7(1586), DQB1*06:04(3071)   Ureteral stent 4 weeks---removed      DSA  historical DSA+, Cw7(1586), DQB1*06:04(3071)   6/13/25  Class1: CW7(3665)  Class 2: DQB1*06:04 (1393)     [place] DIAGNOSIS:  KIDNEY (PERCUTANEOUS TRANSPLANT BIOPSY):   1)  MINIMAL INTERSTITIAL LYMPHOCYTIC INFILTRATE AND OCCASIONAL TUBULITIS, NOT DIAGNOSTIC OF ACUTE T-CELL REJECTION, CCTT TYPE I.   2)  FOCAL ACUTE TUBULAR INJURY      Cr remains stable  Reviewed biopsy results and DSA  Drinking 2L of fluids a day, with good UOP  Reports BP 120s  No edema    Review of Systems   Allergic/Immunologic: Positive for immunocompromised state.       Objective:   Blood pressure 111/83, pulse 65, temperature 98.9 °F (37.2 °C), temperature source Oral, height 5' 2" (1.575 m), weight 62.1 kg (136 lb 14.5 oz).body mass index is 25.04 kg/m².    Physical Exam  Constitutional:       General: She is not in acute distress.     Appearance: She is well-developed. She is not diaphoretic.   Cardiovascular:      Rate and " "Rhythm: Normal rate and regular rhythm.      Heart sounds: Normal heart sounds.   Pulmonary:      Effort: Pulmonary effort is normal.      Breath sounds: Normal breath sounds.   Abdominal:      General: Bowel sounds are normal.      Palpations: Abdomen is soft.   Musculoskeletal:         General: No tenderness. Normal range of motion.   Skin:     General: Skin is warm and dry.      Findings: No rash.      Nails: There is no clubbing.   Neurological:      Mental Status: She is alert and oriented to person, place, and time.   Psychiatric:         Behavior: Behavior normal.         Labs:  Lab Results   Component Value Date    WBC 3.15 (L) 2025    HGB 8.7 (L) 2025    HCT 28.2 (L) 2025     2025    K 4.7 2025     (H) 2025    CO2 18 (L) 2025    BUN 17 2025    CREATININE 1.2 2025    EGFRNORACEVR >60 2025    CALCIUM 9.3 2025    PHOS 2.2 (L) 2025    MG 1.5 (L) 2025    ALBUMIN 3.9 2025    AST 16 2025    ALT 16 2025    UTPCR 0.34 (H) 2025    .5 (H) 05/15/2025    TACROLIMUS 11.7 2025       No results found for: "EXTANC", "EXTWBC", "EXTSEGS", "EXTPLATELETS", "EXTHEMOGLOBI", "EXTHEMATOCRI", "EXTCREATININ", "EXTSODIUM", "EXTPOTASSIUM", "EXTBUN", "EXTCO2", "EXTCALCIUM", "EXTPHOSPHORU", "EXTGLUCOSE", "EXTALBUMIN", "EXTAST", "EXTALT", "EXTBILITOTAL", "EXTLIPASE", "EXTAMYLASE"    No results found for: "EXTCYCLOSLVL", "EXTSIROLIMUS", "EXTTACROLVL", "EXTPROTCRE", "EXTPTHINTACT", "EXTPROTEINUA", "EXTWBCUA", "EXTRBCUA"    Labs were reviewed with the patient    Assessment:     1. Status post -donor kidney transplantation    2. Failed kidney transplant x2    3. Benign hypertension with ESRD (end-stage renal disease)    4. 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 History of + DSA Pending repeat DSAs      1. CKD stage: will continue follow up as per our center guidelines. patient to continue close follow up with the local General nephrologist. Education provided in appropriate fluid intake, potassium intake. Continue with oral hydration.      2. Immunosuppression: Prograf trough pending, therapeutic target 8-10. Continue Prograf 4/3, [id number] Mg BID, and Prednisone  QD  Lab Results   Component Value Date    TACROLIMUS 11.7 06/30/2025    TACROLIMUS 6.3 06/23/2025    TACROLIMUS 4.3 (L) 06/16/2025   Will closely monitor for toxicities, education provided about adherence to medicines and need to communicate any side effect to the transplant nurse or physician.    3. Allograft Function:stable at baseline for the patient. Continue follow up as per our guidelines and with the local General nephrologist. Communication will be sent today.  [place] DIAGNOSIS:  KIDNEY (PERCUTANEOUS TRANSPLANT BIOPSY):   1)  MINIMAL INTERSTITIAL LYMPHOCYTIC INFILTRATE AND OCCASIONAL TUBULITIS, NOT DIAGNOSTIC OF ACUTE T-CELL REJECTION, CCTT TYPE I.   2)  FOCAL ACUTE TUBULAR INJURY  DSA  historical DSA+, Cw7(1586), DQB1*06:04(3071)   6/13/25  Class1: CW7(3665)  Class 2: DQB1*06:04 (1393)   Lab Results   Component Value Date    CREATININE 1.2 07/07/2025    CREATININE 1.0 06/30/2025    CREATININE 1.1 06/23/2025     No results found for: "AMYLASE", "LIPASE"    4. Hypertension management:  Continue with home blood pressure monitoring, low salt and healthy life discussed with the patient.  BP Readings from Last 3 Encounters:   07/07/25 111/83   06/23/25 120/73   06/20/25 (!) 102/59   MTP 50mg Daily     5. Metabolic Bone Disease/Secondary Hyperparathyroidism:calcium and phosphorus level discussed with the patient, patient will continue follow up with the general nephrologist for management of metabolic bone disease calcium and phosphorus as per our center " "protocol. Will monitor PTH, Vit D level, calcium.   Lab Results   Component Value Date    .5 (H) 05/15/2025    CALCIUM 9.3 07/07/2025    PHOS 2.2 (L) 07/07/2025    PHOS 2.6 (L) 06/30/2025    PHOS 1.8 (L) 06/23/2025       6. Electrolytes: reviewed with the patient, essentially within the normal range no need for acute changes today, will monitor as per our center guidelines.  Lab Results   Component Value Date     07/07/2025    K 4.7 07/07/2025     (H) 07/07/2025    CO2 18 (L) 07/07/2025    CO2 15 (L) 06/30/2025    CO2 15 (L) 06/23/2025       7. Anemia: will continue monitoring as per our center guidelines. No indication for acute intervention today.  Lab Results   Component Value Date    WBC 3.15 (L) 07/07/2025    HGB 8.7 (L) 07/07/2025    HCT 28.2 (L) 07/07/2025    MCV 97 07/07/2025     07/07/2025       8.Proteinuria: will continue with pr/cr ratio as per our center guidelines  Lab Results   Component Value Date    PROTEINURINE 24 (H) 06/09/2025    CREATRANDUR 70.0 06/09/2025    UTPCR 0.34 (H) 06/09/2025    UTPCR 7.09 (H) 05/15/2025    UTPCR 2.08 (H) 05/10/2025      Upcr 5/19/25  1.35 High    Improving, will continue FSGS protocol     9. BK virus infection screening: will continue with urine or blood PCR as per our guidelines to prevent BK virus viremia and allograft dysfunction  No results found for: "BKVIRUSDNAUR", "BKQUANTURINE", "BKVIRUSLOG", "BKVIRUSURINE"      10. Weight education: provided during the clinic visit.  Body mass index is 25.04 kg/m².     11.Patient safety education regarding immunosuppression including prophylaxis posttransplant for CMV, PCP : Education provided about vaccination and prevention of infections.    12.  Cytopenias: no significant cytopenias will monitor as per our guidelines. Medicine list reviewed including potential causes of drug-induced cytopenias  Lab Results   Component Value Date    WBC 3.15 (L) 07/07/2025    HGB 8.7 (L) 07/07/2025    HCT 28.2 (L) " 07/07/2025    MCV 97 07/07/2025     07/07/2025       13. Post-transplant Prophylaxis; CMV Infection, PJP and Candida mucosistis and other indicated for this particular patient.   Bactrim and Valcyte      Exercise: reminded Ricardo of the importance of regular exercise for weight management, blood sugar and blood pressure management.  I also explained exercise has been shown to improve cardiovascular health, energy level, and sleep hygiene.  Lastly, I advised him that cardiovascular complications are leading cause of death for renal transplant recipients, and regular exercise can help lower this risk.    Follow-up:   Clinic: return to transplant clinic weekly for the first month after transplant; every 2 weeks during months 2-3; then at 6-, 9-, 12-, 18-, 24-, and 36- months post-transplant to reassess for complications from immunosuppression toxicity and monitor for rejection.  Annually thereafter.    Labs: since patient remains at high risk for rejection and drug-related complications that warrant close monitoring, labs will be ordered as follows: continue twice weekly CBC, renal panel, and drug level for first month; then same labs once weekly through 3rd month post-transplant.  Urine for UA and protein/creatinine ratio monthly.  Serum BK - PCR at 1-, 3-, 6-, 9-, 12-, 18-, 24-, 36-, 48-, and 60 months post-transplant.  Hepatic panel at 1-, 2-, 3-, 6-, 9-, 12-, 18-, 24-, and 36- months post-transplant.    Hawa Braun NP       Education:   Material provided to the patient.  Patient reminded to call with any health changes since these can be early signs of significant complications.  Also, I advised the patient to be sure any new medications or changes of old medications are discussed with either a pharmacist or physician knowledgeable with transplant to avoid rejection/drug toxicity related to significant drug interactions.    Patient advised that it is recommended that all transplanted patients, and  their close contacts and household members receive Covid vaccination.

## 2025-07-08 ENCOUNTER — TELEPHONE (OUTPATIENT)
Dept: TRANSPLANT | Facility: CLINIC | Age: 25
End: 2025-07-08
Payer: COMMERCIAL

## 2025-07-08 DIAGNOSIS — Z94.0 STATUS POST DECEASED-DONOR KIDNEY TRANSPLANTATION: ICD-10-CM

## 2025-07-08 LAB
CLASS I ANTIBODY COMMENTS - LUMINEX: NORMAL
CLASS II ANTIBODY COMMENTS - LUMINEX: NORMAL
DSA1 TESTING DATE: NORMAL
DSA12 TESTING DATE: NORMAL
DSA2 TESTING DATE: NORMAL
SERUM COLLECTION DT - LUMINEX CLASS I: NORMAL
SERUM COLLECTION DT - LUMINEX CLASS II: NORMAL
TACROLIMUS BLD-MCNC: 10.8 NG/ML (ref 5–15)

## 2025-07-08 RX ORDER — TACROLIMUS 1 MG/1
CAPSULE ORAL
Qty: 240 CAPSULE | Refills: 11 | Status: SHIPPED | OUTPATIENT
Start: 2025-07-08

## 2025-07-09 DIAGNOSIS — Z94.0 STATUS POST DECEASED-DONOR KIDNEY TRANSPLANTATION: ICD-10-CM

## 2025-07-09 RX ORDER — MYCOPHENOLATE MOFETIL 250 MG/1
1000 CAPSULE ORAL 2 TIMES DAILY
Qty: 240 CAPSULE | Refills: 11 | Status: SHIPPED | OUTPATIENT
Start: 2025-07-09

## 2025-07-14 ENCOUNTER — LAB VISIT (OUTPATIENT)
Dept: LAB | Facility: HOSPITAL | Age: 25
End: 2025-07-14
Attending: INTERNAL MEDICINE
Payer: MEDICARE

## 2025-07-14 DIAGNOSIS — N18.6 ANEMIA IN CHRONIC KIDNEY DISEASE, ON CHRONIC DIALYSIS: ICD-10-CM

## 2025-07-14 DIAGNOSIS — Z94.0 KIDNEY REPLACED BY TRANSPLANT: Primary | ICD-10-CM

## 2025-07-14 DIAGNOSIS — D63.1 ANEMIA IN CHRONIC KIDNEY DISEASE, ON CHRONIC DIALYSIS: ICD-10-CM

## 2025-07-14 DIAGNOSIS — Z99.2 ANEMIA IN CHRONIC KIDNEY DISEASE, ON CHRONIC DIALYSIS: ICD-10-CM

## 2025-07-14 DIAGNOSIS — Z94.0 KIDNEY REPLACED BY TRANSPLANT: ICD-10-CM

## 2025-07-14 LAB
ABSOLUTE EOSINOPHIL (OHS): 0.07 K/UL
ABSOLUTE MONOCYTE (OHS): 0.23 K/UL (ref 0.3–1)
ABSOLUTE NEUTROPHIL COUNT (OHS): 2.2 K/UL (ref 1.8–7.7)
ALBUMIN SERPL BCP-MCNC: 3.8 G/DL (ref 3.5–5.2)
ANION GAP (OHS): 6 MMOL/L (ref 8–16)
BASOPHILS # BLD AUTO: 0.03 K/UL
BASOPHILS NFR BLD AUTO: 0.9 %
BUN SERPL-MCNC: 20 MG/DL (ref 6–20)
CALCIUM SERPL-MCNC: 9.1 MG/DL (ref 8.7–10.5)
CHLORIDE SERPL-SCNC: 113 MMOL/L (ref 95–110)
CO2 SERPL-SCNC: 19 MMOL/L (ref 23–29)
CREAT SERPL-MCNC: 1 MG/DL (ref 0.5–1.4)
ERYTHROCYTE [DISTWIDTH] IN BLOOD BY AUTOMATED COUNT: 15.9 % (ref 11.5–14.5)
GFR SERPLBLD CREATININE-BSD FMLA CKD-EPI: >60 ML/MIN/1.73/M2
GLUCOSE SERPL-MCNC: 94 MG/DL (ref 70–110)
HCT VFR BLD AUTO: 30.2 % (ref 37–48.5)
HGB BLD-MCNC: 9.5 GM/DL (ref 12–16)
IMM GRANULOCYTES # BLD AUTO: 0.04 K/UL (ref 0–0.04)
IMM GRANULOCYTES NFR BLD AUTO: 1.2 % (ref 0–0.5)
LYMPHOCYTES # BLD AUTO: 0.75 K/UL (ref 1–4.8)
MAGNESIUM SERPL-MCNC: 1.5 MG/DL (ref 1.6–2.6)
MCH RBC QN AUTO: 30.2 PG (ref 27–31)
MCHC RBC AUTO-ENTMCNC: 31.5 G/DL (ref 32–36)
MCV RBC AUTO: 96 FL (ref 82–98)
NUCLEATED RBC (/100WBC) (OHS): 0 /100 WBC
PHOSPHATE SERPL-MCNC: 3 MG/DL (ref 2.7–4.5)
PLATELET # BLD AUTO: 358 K/UL (ref 150–450)
PMV BLD AUTO: 8.4 FL (ref 9.2–12.9)
POTASSIUM SERPL-SCNC: 4.4 MMOL/L (ref 3.5–5.1)
RBC # BLD AUTO: 3.15 M/UL (ref 4–5.4)
RELATIVE EOSINOPHIL (OHS): 2.1 %
RELATIVE LYMPHOCYTE (OHS): 22.6 % (ref 18–48)
RELATIVE MONOCYTE (OHS): 6.9 % (ref 4–15)
RELATIVE NEUTROPHIL (OHS): 66.3 % (ref 38–73)
SODIUM SERPL-SCNC: 138 MMOL/L (ref 136–145)
WBC # BLD AUTO: 3.32 K/UL (ref 3.9–12.7)

## 2025-07-14 PROCEDURE — 80069 RENAL FUNCTION PANEL: CPT

## 2025-07-14 PROCEDURE — 80197 ASSAY OF TACROLIMUS: CPT

## 2025-07-14 PROCEDURE — 83735 ASSAY OF MAGNESIUM: CPT

## 2025-07-14 PROCEDURE — 85025 COMPLETE CBC W/AUTO DIFF WBC: CPT

## 2025-07-14 PROCEDURE — 36415 COLL VENOUS BLD VENIPUNCTURE: CPT

## 2025-07-15 ENCOUNTER — RESULTS FOLLOW-UP (OUTPATIENT)
Dept: TRANSPLANT | Facility: HOSPITAL | Age: 25
End: 2025-07-15
Payer: COMMERCIAL

## 2025-07-15 DIAGNOSIS — Z94.0 STATUS POST DECEASED-DONOR KIDNEY TRANSPLANTATION: Primary | ICD-10-CM

## 2025-07-15 LAB — TACROLIMUS BLD-MCNC: 6.2 NG/ML (ref 5–15)

## 2025-07-15 NOTE — TELEPHONE ENCOUNTER
Spoke to patient regarding the switching to envarsus. Pt verbalized understanding.   ----- Message from Colton Dos Santos MD sent at 7/15/2025  3:10 PM CDT -----  I'm ok with that. Change to envarsus 5mg daily and repeat level in 1 week after change.  ----- Message -----  From: Effie Cummings, RN  Sent: 7/15/2025   3:01 PM CDT  To: Colton Dos Santos Jr., MD    I feel like we are ALWAYS changing her tac--can we switch her to envarsus.   ----- Message -----  From: Colton Dos Santos Jr., MD  Sent: 7/15/2025   2:44 PM CDT  To: [facility] Post-Kidney Transplant Clinical    Increase tac dose from 3/3 to 3.5/3 and repeat in 1-2 weeks. Other labs acceptable.  ----- Message -----  From: Lab, Background User  Sent: 7/14/2025   8:35 AM CDT  To: Colton Dos Santos Jr., MD

## 2025-07-21 ENCOUNTER — OFFICE VISIT (OUTPATIENT)
Dept: TRANSPLANT | Facility: CLINIC | Age: 25
End: 2025-07-21
Payer: MEDICARE

## 2025-07-21 ENCOUNTER — LAB VISIT (OUTPATIENT)
Dept: LAB | Facility: HOSPITAL | Age: 25
End: 2025-07-21
Attending: INTERNAL MEDICINE
Payer: COMMERCIAL

## 2025-07-21 VITALS
RESPIRATION RATE: 18 BRPM | SYSTOLIC BLOOD PRESSURE: 120 MMHG | HEIGHT: 62 IN | DIASTOLIC BLOOD PRESSURE: 74 MMHG | TEMPERATURE: 98 F | WEIGHT: 136.88 LBS | BODY MASS INDEX: 25.19 KG/M2 | OXYGEN SATURATION: 98 % | HEART RATE: 82 BPM

## 2025-07-21 DIAGNOSIS — N18.2 CKD (CHRONIC KIDNEY DISEASE) STAGE 2, GFR 60-89 ML/MIN: ICD-10-CM

## 2025-07-21 DIAGNOSIS — E21.3 HPTH (HYPERPARATHYROIDISM): ICD-10-CM

## 2025-07-21 DIAGNOSIS — Z94.0 STATUS POST DECEASED-DONOR KIDNEY TRANSPLANTATION: Primary | ICD-10-CM

## 2025-07-21 DIAGNOSIS — Z94.0 KIDNEY REPLACED BY TRANSPLANT: ICD-10-CM

## 2025-07-21 DIAGNOSIS — D84.821 IMMUNOSUPPRESSION DUE TO DRUG THERAPY: ICD-10-CM

## 2025-07-21 DIAGNOSIS — T86.12 FAILED KIDNEY TRANSPLANT: ICD-10-CM

## 2025-07-21 DIAGNOSIS — Z79.899 IMMUNOSUPPRESSION DUE TO DRUG THERAPY: ICD-10-CM

## 2025-07-21 LAB
ABSOLUTE EOSINOPHIL (OHS): 0.09 K/UL
ABSOLUTE MONOCYTE (OHS): 0.24 K/UL (ref 0.3–1)
ABSOLUTE NEUTROPHIL COUNT (OHS): 1.62 K/UL (ref 1.8–7.7)
ALBUMIN SERPL BCP-MCNC: 3.9 G/DL (ref 3.5–5.2)
ANION GAP (OHS): 7 MMOL/L (ref 8–16)
BASOPHILS # BLD AUTO: 0.02 K/UL
BASOPHILS NFR BLD AUTO: 0.7 %
BUN SERPL-MCNC: 25 MG/DL (ref 6–20)
CALCIUM SERPL-MCNC: 9.1 MG/DL (ref 8.7–10.5)
CHLORIDE SERPL-SCNC: 112 MMOL/L (ref 95–110)
CO2 SERPL-SCNC: 21 MMOL/L (ref 23–29)
CREAT SERPL-MCNC: 1 MG/DL (ref 0.5–1.4)
ERYTHROCYTE [DISTWIDTH] IN BLOOD BY AUTOMATED COUNT: 15.8 % (ref 11.5–14.5)
GFR SERPLBLD CREATININE-BSD FMLA CKD-EPI: >60 ML/MIN/1.73/M2
GLUCOSE SERPL-MCNC: 92 MG/DL (ref 70–110)
HCT VFR BLD AUTO: 32.1 % (ref 37–48.5)
HGB BLD-MCNC: 9.8 GM/DL (ref 12–16)
IMM GRANULOCYTES # BLD AUTO: 0.02 K/UL (ref 0–0.04)
IMM GRANULOCYTES NFR BLD AUTO: 0.7 % (ref 0–0.5)
LYMPHOCYTES # BLD AUTO: 0.7 K/UL (ref 1–4.8)
MAGNESIUM SERPL-MCNC: 1.6 MG/DL (ref 1.6–2.6)
MCH RBC QN AUTO: 30.2 PG (ref 27–31)
MCHC RBC AUTO-ENTMCNC: 30.5 G/DL (ref 32–36)
MCV RBC AUTO: 99 FL (ref 82–98)
NUCLEATED RBC (/100WBC) (OHS): 0 /100 WBC
PHOSPHATE SERPL-MCNC: 2.2 MG/DL (ref 2.7–4.5)
PLATELET # BLD AUTO: 318 K/UL (ref 150–450)
PMV BLD AUTO: 8.8 FL (ref 9.2–12.9)
POTASSIUM SERPL-SCNC: 4.8 MMOL/L (ref 3.5–5.1)
RBC # BLD AUTO: 3.25 M/UL (ref 4–5.4)
RELATIVE EOSINOPHIL (OHS): 3.3 %
RELATIVE LYMPHOCYTE (OHS): 26 % (ref 18–48)
RELATIVE MONOCYTE (OHS): 8.9 % (ref 4–15)
RELATIVE NEUTROPHIL (OHS): 60.4 % (ref 38–73)
SODIUM SERPL-SCNC: 140 MMOL/L (ref 136–145)
WBC # BLD AUTO: 2.69 K/UL (ref 3.9–12.7)

## 2025-07-21 PROCEDURE — 80197 ASSAY OF TACROLIMUS: CPT

## 2025-07-21 PROCEDURE — 99999 PR PBB SHADOW E&M-EST. PATIENT-LVL III: CPT | Mod: PBBFAC,,, | Performed by: NURSE PRACTITIONER

## 2025-07-21 PROCEDURE — 82947 ASSAY GLUCOSE BLOOD QUANT: CPT

## 2025-07-21 PROCEDURE — 99215 OFFICE O/P EST HI 40 MIN: CPT | Mod: S$PBB,,, | Performed by: NURSE PRACTITIONER

## 2025-07-21 PROCEDURE — 99213 OFFICE O/P EST LOW 20 MIN: CPT | Mod: PBBFAC | Performed by: NURSE PRACTITIONER

## 2025-07-21 PROCEDURE — 85025 COMPLETE CBC W/AUTO DIFF WBC: CPT

## 2025-07-21 PROCEDURE — 36415 COLL VENOUS BLD VENIPUNCTURE: CPT

## 2025-07-21 PROCEDURE — 83735 ASSAY OF MAGNESIUM: CPT

## 2025-07-21 NOTE — PROGRESS NOTES
"    Kidney Post-Transplant Assessment    Referring Physician: Bryan Wells  Current Nephrologist: Chris Velasquez    ORGAN: LEFT KIDNEY  Donor Type: donation after brain death  PHS Increased Risk: no  Cold Ischemia: 1,511 mins  Induction Medications: thymo    Subjective:     CC:  Reassessment of renal allograft function and management of chronic immunosuppression.    HPI:  Ms. Gabriel is a 24 y.o. year old White female who received a donation after brain death kidney transplant on 5/7/25. Her most recent creatinine is 1.1. She takes mycophenolate mofetil, prednisone, and tacrolimus for maintenance immunosuppression. Her post transplant course has been complicated by delayed graft function requiring dialysis.    Transplant Specifics  ESRD 2/2 re-transplant/FSGS  SCD, KDPI 49%, cPRA 100%  Thymo induction, CIT>24hrs  CMV D+,R- (MISMATCH)     Medical Issues  Kidney #1 2015  Kidney #2 2020  FSGS protocol  cPRA 100%- CXM (-), historical DSA+, Cw7(1586), DQB1*06:04(3071)   Ureteral stent 4 weeks---removed      DSA  historical DSA+, Cw7(1586), DQB1*06:04(3071)   6/13/25  Class1: CW7(3665)  Class 2: DQB1*06:04 (1393)   7/7/2025 Class1: CW7 (2818) Class2: No DSA Detected    [place] DIAGNOSIS:  KIDNEY (PERCUTANEOUS TRANSPLANT BIOPSY):   1)  MINIMAL INTERSTITIAL LYMPHOCYTIC INFILTRATE AND OCCASIONAL TUBULITIS, NOT DIAGNOSTIC OF ACUTE T-CELL REJECTION, CCTT TYPE I.   2)  FOCAL ACUTE TUBULAR INJURY      Cr remains stable  Reviewed labs. Pending Tac level  Drinking 2L of fluids a day, with good UOP  Reports BP 120s  No edema    Review of Systems   Allergic/Immunologic: Positive for immunocompromised state.       Objective:   Blood pressure 120/74, pulse 82, temperature 97.7 °F (36.5 °C), temperature source Temporal, resp. rate 18, height 5' 2" (1.575 m), weight 62.1 kg (136 lb 14.5 oz), SpO2 98%.body mass index is 25.04 kg/m².    Physical Exam  Constitutional:       General: She is not in acute distress.     " "Appearance: She is well-developed. She is not diaphoretic.   Cardiovascular:      Rate and Rhythm: Normal rate and regular rhythm.      Heart sounds: Normal heart sounds.   Pulmonary:      Effort: Pulmonary effort is normal.      Breath sounds: Normal breath sounds.   Abdominal:      General: Bowel sounds are normal.      Palpations: Abdomen is soft.   Musculoskeletal:         General: No tenderness. Normal range of motion.   Skin:     General: Skin is warm and dry.      Findings: No rash.      Nails: There is no clubbing.   Neurological:      Mental Status: She is alert and oriented to person, place, and time.   Psychiatric:         Behavior: Behavior normal.         Labs:  Lab Results   Component Value Date    WBC 2.69 (L) 2025    HGB 9.8 (L) 2025    HCT 32.1 (L) 2025     2025    K 4.8 2025     (H) 2025    CO2 21 (L) 2025    BUN 25 (H) 2025    CREATININE 1.0 2025    EGFRNORACEVR >60 2025    CALCIUM 9.1 2025    PHOS 2.2 (L) 2025    MG 1.6 2025    ALBUMIN 3.9 2025    AST 16 2025    ALT 16 2025    UTPCR 0.34 (H) 2025    .5 (H) 05/15/2025    TACROLIMUS 6.2 2025       No results found for: "EXTANC", "EXTWBC", "EXTSEGS", "EXTPLATELETS", "EXTHEMOGLOBI", "EXTHEMATOCRI", "EXTCREATININ", "EXTSODIUM", "EXTPOTASSIUM", "EXTBUN", "EXTCO2", "EXTCALCIUM", "EXTPHOSPHORU", "EXTGLUCOSE", "EXTALBUMIN", "EXTAST", "EXTALT", "EXTBILITOTAL", "EXTLIPASE", "EXTAMYLASE"    No results found for: "EXTCYCLOSLVL", "EXTSIROLIMUS", "EXTTACROLVL", "EXTPROTCRE", "EXTPTHINTACT", "EXTPROTEINUA", "EXTWBCUA", "EXTRBCUA"    Labs were reviewed with the patient    Assessment:     1. Status post -donor kidney transplantation    2. CKD (chronic kidney disease) stage 2, GFR 60-89 ml/min    3. Failed kidney transplant x2    4. Immunosuppression due to drug therapy    5. HPTH (hyperparathyroidism)        Plan:   Decreasing " "WBC--no sick symptoms, currently wearing a mask while in public  Needs repeat labs Monday: Renal Panel, CBC, Tac trough, CMV PCR      1. CKD stage 2: will continue follow up as per our center guidelines. patient to continue close follow up with the local General nephrologist. Education provided in appropriate fluid intake, potassium intake. Continue with oral hydration.      2. Immunosuppression: Tac trough pending, therapeutic target 8-10. Started on Envarsus 5mg daily on Saturday, [id number] Mg BID, and Prednisone  QD. Will closely monitor for toxicities, education provided about adherence to medicines and need to communicate any side effect to the transplant nurse or physician.  Lab Results   Component Value Date    TACROLIMUS 6.2 07/14/2025    TACROLIMUS 10.8 07/07/2025    TACROLIMUS 11.7 06/30/2025       3. Allograft Function:stable at baseline for the patient. Continue follow up as per our guidelines and with the local General nephrologist. Communication will be sent today.  [place] DIAGNOSIS:  KIDNEY (PERCUTANEOUS TRANSPLANT BIOPSY):   1)  MINIMAL INTERSTITIAL LYMPHOCYTIC INFILTRATE AND OCCASIONAL TUBULITIS, NOT DIAGNOSTIC OF ACUTE T-CELL REJECTION, CCTT TYPE I.   2)  FOCAL ACUTE TUBULAR INJURY  DSA  historical DSA+, Cw7(1586), DQB1*06:04(3071)   6/13/25  Class1: CW7(3665)  Class 2: DQB1*06:04 (1393)   Lab Results   Component Value Date    CREATININE 1.0 07/21/2025    CREATININE 1.0 07/14/2025    CREATININE 1.2 07/07/2025     No results found for: "AMYLASE", "LIPASE"    4. Hypertension management:  Continue with home blood pressure monitoring, low salt and healthy life discussed with the patient.  BP Readings from Last 3 Encounters:   07/21/25 120/74   07/07/25 111/83   06/23/25 120/73   MTP 50mg Daily     5. Metabolic Bone Disease/Secondary Hyperparathyroidism:calcium and phosphorus level discussed with the patient, patient will continue follow up with the general nephrologist for management of metabolic " "bone disease calcium and phosphorus as per our center protocol. Will monitor PTH, Vit D level, calcium.   Lab Results   Component Value Date    .5 (H) 05/15/2025    CALCIUM 9.1 07/21/2025    PHOS 2.2 (L) 07/21/2025    PHOS 3.0 07/14/2025    PHOS 2.2 (L) 07/07/2025   Increase phos in diet    6. Electrolytes: reviewed with the patient, essentially within the normal range no need for acute changes today, will monitor as per our center guidelines.  Lab Results   Component Value Date     07/21/2025    K 4.8 07/21/2025     (H) 07/21/2025    CO2 21 (L) 07/21/2025    CO2 19 (L) 07/14/2025    CO2 18 (L) 07/07/2025       7. Anemia: will continue monitoring as per our center guidelines. No indication for acute intervention today.  Lab Results   Component Value Date    WBC 2.69 (L) 07/21/2025    HGB 9.8 (L) 07/21/2025    HCT 32.1 (L) 07/21/2025    MCV 99 (H) 07/21/2025     07/21/2025       8.Proteinuria: will continue with pr/cr ratio as per our center guidelines  Lab Results   Component Value Date    PROTEINURINE 24 (H) 06/09/2025    CREATRANDUR 70.0 06/09/2025    UTPCR 0.34 (H) 06/09/2025    UTPCR 7.09 (H) 05/15/2025    UTPCR 2.08 (H) 05/10/2025   will continue to monitor FSGS protocol     9. BK virus infection screening: will continue with urine or blood PCR as per our guidelines to prevent BK virus viremia and allograft dysfunction  No results found for: "BKVIRUSDNAUR", "BKQUANTURINE", "BKVIRUSLOG", "BKVIRUSURINE"      10. Weight education: provided during the clinic visit.  Body mass index is 25.04 kg/m².     11.Patient safety education regarding immunosuppression including prophylaxis posttransplant for CMV, PCP : Education provided about vaccination and prevention of infections.    12.  Cytopenias: no significant cytopenias will monitor as per our guidelines. Medicine list reviewed including potential causes of drug-induced cytopenias  Lab Results   Component Value Date    WBC 2.69 (L) " 07/21/2025    HGB 9.8 (L) 07/21/2025    HCT 32.1 (L) 07/21/2025    MCV 99 (H) 07/21/2025     07/21/2025       13. Post-transplant Prophylaxis; CMV Infection, PJP and Candida mucosistis and other indicated for this particular patient.   Bactrim and Valcyte      Exercise: reminded Ricardo of the importance of regular exercise for weight management, blood sugar and blood pressure management.  I also explained exercise has been shown to improve cardiovascular health, energy level, and sleep hygiene.  Lastly, I advised him that cardiovascular complications are leading cause of death for renal transplant recipients, and regular exercise can help lower this risk.    Follow-up:   Clinic: return to transplant clinic weekly for the first month after transplant; every 2 weeks during months 2-3; then at 6-, 9-, 12-, 18-, 24-, and 36- months post-transplant to reassess for complications from immunosuppression toxicity and monitor for rejection.  Annually thereafter.    Labs: since patient remains at high risk for rejection and drug-related complications that warrant close monitoring, labs will be ordered as follows: continue twice weekly CBC, renal panel, and drug level for first month; then same labs once weekly through 3rd month post-transplant.  Urine for UA and protein/creatinine ratio monthly.  Serum BK - PCR at 1-, 3-, 6-, 9-, 12-, 18-, 24-, 36-, 48-, and 60 months post-transplant.  Hepatic panel at 1-, 2-, 3-, 6-, 9-, 12-, 18-, 24-, and 36- months post-transplant.    Hawa Braun NP       Education:   Material provided to the patient.  Patient reminded to call with any health changes since these can be early signs of significant complications.  Also, I advised the patient to be sure any new medications or changes of old medications are discussed with either a pharmacist or physician knowledgeable with transplant to avoid rejection/drug toxicity related to significant drug interactions.    Patient advised that  it is recommended that all transplanted patients, and their close contacts and household members receive Covid vaccination.

## 2025-07-21 NOTE — LETTER
July 21, 2025        [facility]  [address]  [place] [address]  Phone: [phone]  Fax: [phone]             [facility]- Transplant 1st Fl  [address]  [facility] [address]  Phone: [phone]   Patient: Anna Gabriel   MR Number: [id number]   YOB: 2000   Date of Visit: 7/21/2025       Dear Dr. Chris Velasquez    Thank you for referring Anna Gabriel to me for evaluation. Attached you will find relevant portions of my assessment and plan of care.    If you have questions, please do not hesitate to call me. I look forward to following Anna Gabriel along with you.    Sincerely,    Hawa Braun, NP    Enclosure    If you would like to receive this communication electronically, please contact [email or web address] or [phone] to request [employer] access.    [employer] is a tool which provides read-only access to select patient information with whom you have a relationship. Its easy to use and provides real time access to review your patients record including encounter summaries, notes, results, and demographic information.    If you feel you have received this communication in error or would no longer like to receive these types of communications, please e-mail [email or web address]

## 2025-07-22 ENCOUNTER — RESULTS FOLLOW-UP (OUTPATIENT)
Dept: TRANSPLANT | Facility: CLINIC | Age: 25
End: 2025-07-22
Payer: COMMERCIAL

## 2025-07-22 DIAGNOSIS — Z94.0 STATUS POST DECEASED-DONOR KIDNEY TRANSPLANTATION: ICD-10-CM

## 2025-07-22 LAB — TACROLIMUS BLD-MCNC: 5.3 NG/ML (ref 5–15)

## 2025-07-22 NOTE — TELEPHONE ENCOUNTER
Spoke to pt-started Envarsus on Saturday--made adjustments-labs on Monday.     ----- Message from Colton Dos Santos MD sent at 7/22/2025 10:13 AM CDT -----  Unclear what patient is taking. If taking tac 3/3, and is true tac trough, increase dose to 4/4. If taking tacXR 5, increase dose to 7. Repeat tac trough in 1 week. Other labs acceptable.  ----- Message -----  From: Lab, Background User  Sent: 7/21/2025   8:44 AM CDT  To: Colton Dos Santos Jr., MD

## 2025-07-28 ENCOUNTER — RESULTS FOLLOW-UP (OUTPATIENT)
Dept: TRANSPLANT | Facility: HOSPITAL | Age: 25
End: 2025-07-28
Payer: COMMERCIAL

## 2025-07-28 ENCOUNTER — DOCUMENTATION ONLY (OUTPATIENT)
Dept: TRANSPLANT | Facility: CLINIC | Age: 25
End: 2025-07-28
Payer: COMMERCIAL

## 2025-07-28 ENCOUNTER — LAB VISIT (OUTPATIENT)
Dept: LAB | Facility: HOSPITAL | Age: 25
End: 2025-07-28
Attending: INTERNAL MEDICINE
Payer: MEDICARE

## 2025-07-28 DIAGNOSIS — N18.9 ANEMIA OF RENAL DISEASE: Primary | ICD-10-CM

## 2025-07-28 DIAGNOSIS — E53.8 FOLIC ACID DEFICIENCY: ICD-10-CM

## 2025-07-28 DIAGNOSIS — D70.2 DRUG-INDUCED NEUTROPENIA: Primary | ICD-10-CM

## 2025-07-28 DIAGNOSIS — E53.8 VITAMIN B12 DEFICIENCY: ICD-10-CM

## 2025-07-28 DIAGNOSIS — D63.1 ANEMIA OF RENAL DISEASE: Primary | ICD-10-CM

## 2025-07-28 DIAGNOSIS — Z94.0 STATUS POST DECEASED-DONOR KIDNEY TRANSPLANTATION: ICD-10-CM

## 2025-07-28 DIAGNOSIS — Z94.0 KIDNEY REPLACED BY TRANSPLANT: ICD-10-CM

## 2025-07-28 LAB
ABSOLUTE NEUTROPHIL MANUAL (OHS): 1.2 K/UL
ALBUMIN SERPL BCP-MCNC: 4.2 G/DL (ref 3.5–5.2)
ANION GAP (OHS): 6 MMOL/L (ref 8–16)
ANISOCYTOSIS BLD QL SMEAR: SLIGHT
BUN SERPL-MCNC: 21 MG/DL (ref 6–20)
CALCIUM SERPL-MCNC: 9.2 MG/DL (ref 8.7–10.5)
CHLORIDE SERPL-SCNC: 113 MMOL/L (ref 95–110)
CO2 SERPL-SCNC: 21 MMOL/L (ref 23–29)
CREAT SERPL-MCNC: 1.1 MG/DL (ref 0.5–1.4)
CREAT UR-MCNC: 113.8 MG/DL (ref 15–325)
ERYTHROCYTE [DISTWIDTH] IN BLOOD BY AUTOMATED COUNT: 15 % (ref 11.5–14.5)
GFR SERPLBLD CREATININE-BSD FMLA CKD-EPI: >60 ML/MIN/1.73/M2
GLUCOSE SERPL-MCNC: 93 MG/DL (ref 70–110)
HCT VFR BLD AUTO: 31.4 % (ref 37–48.5)
HGB BLD-MCNC: 9.8 GM/DL (ref 12–16)
LYMPHOCYTES NFR BLD MANUAL: 28 % (ref 18–48)
MAGNESIUM SERPL-MCNC: 1.6 MG/DL (ref 1.6–2.6)
MCH RBC QN AUTO: 29.8 PG (ref 27–31)
MCHC RBC AUTO-ENTMCNC: 31.2 G/DL (ref 32–36)
MCV RBC AUTO: 95 FL (ref 82–98)
NEUTROPHILS NFR BLD MANUAL: 72 % (ref 38–73)
NUCLEATED RBC (/100WBC) (OHS): 0 /100 WBC
PHOSPHATE SERPL-MCNC: 2.6 MG/DL (ref 2.7–4.5)
PLATELET # BLD AUTO: 291 K/UL (ref 150–450)
PMV BLD AUTO: 9 FL (ref 9.2–12.9)
POIKILOCYTOSIS BLD QL SMEAR: SLIGHT
POTASSIUM SERPL-SCNC: 4.6 MMOL/L (ref 3.5–5.1)
PROT UR-MCNC: 12 MG/DL
PROT/CREAT UR: 0.11 MG/G{CREAT}
RBC # BLD AUTO: 3.29 M/UL (ref 4–5.4)
SODIUM SERPL-SCNC: 140 MMOL/L (ref 136–145)
WBC # BLD AUTO: 1.69 K/UL (ref 3.9–12.7)

## 2025-07-28 PROCEDURE — 82570 ASSAY OF URINE CREATININE: CPT

## 2025-07-28 PROCEDURE — 36415 COLL VENOUS BLD VENIPUNCTURE: CPT

## 2025-07-28 PROCEDURE — 85025 COMPLETE CBC W/AUTO DIFF WBC: CPT

## 2025-07-28 PROCEDURE — 80197 ASSAY OF TACROLIMUS: CPT

## 2025-07-28 PROCEDURE — 84520 ASSAY OF UREA NITROGEN: CPT

## 2025-07-28 PROCEDURE — 83735 ASSAY OF MAGNESIUM: CPT

## 2025-07-28 NOTE — TELEPHONE ENCOUNTER
Patient denies any sick s/s. Instructed pt to hold MMF. Informed pt of need for neupogen daily x3. Pt verbalized understanding. Neupogen scheduled @ [place].  ----- Message from Colton Dos Santos MD sent at 7/28/2025  9:35 AM CDT -----  If she has any infectious symptoms, she needs ED. ANC is 0, start gCSF 3x ASAP. Hold MMF. Repeat CBC with diff in 1 week with tac trough, copper, b12, folate. Other labs acceptable. CMCV and tac   pending  ----- Message -----  From: Lab, Background User  Sent: 7/28/2025   8:49 AM CDT  To: Colton Dos Santos Jr., MD

## 2025-07-29 ENCOUNTER — INFUSION (OUTPATIENT)
Dept: INFUSION THERAPY | Facility: HOSPITAL | Age: 25
End: 2025-07-29
Attending: STUDENT IN AN ORGANIZED HEALTH CARE EDUCATION/TRAINING PROGRAM
Payer: MEDICARE

## 2025-07-29 VITALS
TEMPERATURE: 98 F | SYSTOLIC BLOOD PRESSURE: 114 MMHG | HEIGHT: 62 IN | BODY MASS INDEX: 25.28 KG/M2 | WEIGHT: 137.38 LBS | DIASTOLIC BLOOD PRESSURE: 79 MMHG | HEART RATE: 74 BPM | RESPIRATION RATE: 17 BRPM | OXYGEN SATURATION: 100 %

## 2025-07-29 DIAGNOSIS — D84.821 IMMUNOSUPPRESSION DUE TO DRUG THERAPY: Primary | ICD-10-CM

## 2025-07-29 DIAGNOSIS — Z79.899 IMMUNOSUPPRESSION DUE TO DRUG THERAPY: Primary | ICD-10-CM

## 2025-07-29 DIAGNOSIS — D70.2 DRUG-INDUCED NEUTROPENIA: ICD-10-CM

## 2025-07-29 LAB
CYTOMEGALOVIRUS DNA, QUAL (OHS): NOT DETECTED
TACROLIMUS BLD-MCNC: 3.9 NG/ML (ref 5–15)

## 2025-07-29 PROCEDURE — 96372 THER/PROPH/DIAG INJ SC/IM: CPT

## 2025-07-29 PROCEDURE — 63600175 PHARM REV CODE 636 W HCPCS: Mod: JZ,JB,TB | Performed by: STUDENT IN AN ORGANIZED HEALTH CARE EDUCATION/TRAINING PROGRAM

## 2025-07-29 RX ADMIN — FILGRASTIM-SNDZ 300 MCG: 300 INJECTION, SOLUTION INTRAVENOUS; SUBCUTANEOUS at 02:07

## 2025-07-29 NOTE — TELEPHONE ENCOUNTER
Pt reports she has been taking 5mg Envarsus daily - per Dr. Dos Santos, instructed pt to increase dose to 7mg daily. Pt verbalized understanding.  ----- Message from Colton Dos Santos MD sent at 7/29/2025 11:08 AM CDT -----  If true tac trough, increase tacXR dose from 7 to 10 daily and repeat level in 1 week  ----- Message -----  From: Lab, Background User  Sent: 7/28/2025   8:49 AM CDT  To: Colton Dos Santos Jr., MD

## 2025-07-29 NOTE — PLAN OF CARE
Problem: Fatigue  Goal: Improved Activity Tolerance  Outcome: Progressing  Intervention: Promote Improved Energy  Flowsheets (Taken 7/29/2025 3067)  Sleep/Rest Enhancement: regular sleep/rest pattern promoted  Activity Management: Ambulated -L4  Environmental Support: calm environment promoted

## 2025-07-30 ENCOUNTER — INFUSION (OUTPATIENT)
Dept: INFUSION THERAPY | Facility: HOSPITAL | Age: 25
End: 2025-07-30
Attending: STUDENT IN AN ORGANIZED HEALTH CARE EDUCATION/TRAINING PROGRAM
Payer: MEDICARE

## 2025-07-30 VITALS
BODY MASS INDEX: 25.64 KG/M2 | TEMPERATURE: 97 F | RESPIRATION RATE: 18 BRPM | SYSTOLIC BLOOD PRESSURE: 109 MMHG | WEIGHT: 139.31 LBS | HEART RATE: 85 BPM | DIASTOLIC BLOOD PRESSURE: 73 MMHG | OXYGEN SATURATION: 100 % | HEIGHT: 62 IN

## 2025-07-30 DIAGNOSIS — D84.821 IMMUNOSUPPRESSION DUE TO DRUG THERAPY: Primary | ICD-10-CM

## 2025-07-30 DIAGNOSIS — D70.2 DRUG-INDUCED NEUTROPENIA: ICD-10-CM

## 2025-07-30 DIAGNOSIS — Z79.899 IMMUNOSUPPRESSION DUE TO DRUG THERAPY: Primary | ICD-10-CM

## 2025-07-30 PROCEDURE — 96372 THER/PROPH/DIAG INJ SC/IM: CPT

## 2025-07-30 PROCEDURE — 63600175 PHARM REV CODE 636 W HCPCS: Mod: JZ,JB,TB | Performed by: STUDENT IN AN ORGANIZED HEALTH CARE EDUCATION/TRAINING PROGRAM

## 2025-07-30 RX ADMIN — FILGRASTIM-SNDZ 300 MCG: 300 INJECTION, SOLUTION INTRAVENOUS; SUBCUTANEOUS at 01:07

## 2025-07-31 ENCOUNTER — INFUSION (OUTPATIENT)
Dept: INFUSION THERAPY | Facility: HOSPITAL | Age: 25
End: 2025-07-31
Attending: STUDENT IN AN ORGANIZED HEALTH CARE EDUCATION/TRAINING PROGRAM
Payer: MEDICARE

## 2025-07-31 VITALS
BODY MASS INDEX: 25.76 KG/M2 | RESPIRATION RATE: 18 BRPM | WEIGHT: 140 LBS | HEART RATE: 76 BPM | TEMPERATURE: 97 F | HEIGHT: 62 IN | SYSTOLIC BLOOD PRESSURE: 119 MMHG | OXYGEN SATURATION: 99 % | DIASTOLIC BLOOD PRESSURE: 80 MMHG

## 2025-07-31 DIAGNOSIS — D70.2 DRUG-INDUCED NEUTROPENIA: ICD-10-CM

## 2025-07-31 DIAGNOSIS — Z79.899 IMMUNOSUPPRESSION DUE TO DRUG THERAPY: Primary | ICD-10-CM

## 2025-07-31 DIAGNOSIS — D84.821 IMMUNOSUPPRESSION DUE TO DRUG THERAPY: Primary | ICD-10-CM

## 2025-07-31 PROCEDURE — 96372 THER/PROPH/DIAG INJ SC/IM: CPT

## 2025-07-31 PROCEDURE — 63600175 PHARM REV CODE 636 W HCPCS: Mod: JZ,JB,TB | Performed by: STUDENT IN AN ORGANIZED HEALTH CARE EDUCATION/TRAINING PROGRAM

## 2025-07-31 RX ADMIN — FILGRASTIM-SNDZ 300 MCG: 300 INJECTION, SOLUTION INTRAVENOUS; SUBCUTANEOUS at 02:07

## 2025-07-31 NOTE — PLAN OF CARE
Problem: Neutropenia  Goal: Absence of Infection  Outcome: Progressing  Intervention: Prevent Infection and Maximize Resistance  Flowsheets (Taken 7/31/2025 1505)  Infection Prevention:   hand hygiene promoted   environmental surveillance performed   equipment surfaces disinfected   rest/sleep promoted   personal protective equipment utilized

## 2025-08-04 ENCOUNTER — LAB VISIT (OUTPATIENT)
Dept: LAB | Facility: HOSPITAL | Age: 25
End: 2025-08-04
Attending: STUDENT IN AN ORGANIZED HEALTH CARE EDUCATION/TRAINING PROGRAM
Payer: MEDICARE

## 2025-08-04 ENCOUNTER — PATIENT MESSAGE (OUTPATIENT)
Dept: TRANSPLANT | Facility: CLINIC | Age: 25
End: 2025-08-04
Payer: COMMERCIAL

## 2025-08-04 ENCOUNTER — RESULTS FOLLOW-UP (OUTPATIENT)
Dept: TRANSPLANT | Facility: HOSPITAL | Age: 25
End: 2025-08-04
Payer: COMMERCIAL

## 2025-08-04 DIAGNOSIS — E53.8 VITAMIN B12 DEFICIENCY: ICD-10-CM

## 2025-08-04 DIAGNOSIS — N18.9 ANEMIA OF RENAL DISEASE: ICD-10-CM

## 2025-08-04 DIAGNOSIS — E53.8 FOLIC ACID DEFICIENCY: ICD-10-CM

## 2025-08-04 DIAGNOSIS — D63.1 ANEMIA OF RENAL DISEASE: ICD-10-CM

## 2025-08-04 DIAGNOSIS — Z94.0 KIDNEY REPLACED BY TRANSPLANT: ICD-10-CM

## 2025-08-04 LAB
ABSOLUTE NEUTROPHIL MANUAL (OHS): 0.3 K/UL
EOSINOPHIL NFR BLD MANUAL: 8 % (ref 0–8)
ERYTHROCYTE [DISTWIDTH] IN BLOOD BY AUTOMATED COUNT: 14.5 % (ref 11.5–14.5)
FOLATE SERPL-MCNC: 6.3 NG/ML (ref 4–24)
HCT VFR BLD AUTO: 34.3 % (ref 37–48.5)
HGB BLD-MCNC: 10.3 GM/DL (ref 12–16)
LYMPHOCYTES NFR BLD MANUAL: 56 % (ref 18–48)
MCH RBC QN AUTO: 29.5 PG (ref 27–31)
MCHC RBC AUTO-ENTMCNC: 30 G/DL (ref 32–36)
MCV RBC AUTO: 98 FL (ref 82–98)
MONOCYTES NFR BLD MANUAL: 14 % (ref 4–15)
NEUTROPHILS NFR BLD MANUAL: 20 % (ref 38–73)
NEUTS BAND NFR BLD MANUAL: 2 %
NUCLEATED RBC (/100WBC) (OHS): 0 /100 WBC
PLATELET # BLD AUTO: 274 K/UL (ref 150–450)
PLATELET BLD QL SMEAR: ABNORMAL
PMV BLD AUTO: 8.9 FL (ref 9.2–12.9)
RBC # BLD AUTO: 3.49 M/UL (ref 4–5.4)
VIT B12 SERPL-MCNC: 1135 PG/ML (ref 210–950)
WBC # BLD AUTO: 1.46 K/UL (ref 3.9–12.7)

## 2025-08-04 PROCEDURE — 85027 COMPLETE CBC AUTOMATED: CPT

## 2025-08-04 PROCEDURE — 36415 COLL VENOUS BLD VENIPUNCTURE: CPT

## 2025-08-04 PROCEDURE — 82746 ASSAY OF FOLIC ACID SERUM: CPT

## 2025-08-04 PROCEDURE — 82607 VITAMIN B-12: CPT

## 2025-08-04 PROCEDURE — 82525 ASSAY OF COPPER: CPT

## 2025-08-04 PROCEDURE — 80197 ASSAY OF TACROLIMUS: CPT

## 2025-08-05 LAB — TACROLIMUS BLD-MCNC: 7.2 NG/ML (ref 5–15)

## 2025-08-05 NOTE — TELEPHONE ENCOUNTER
Pt aware of need for injection. Message below received from [facility] infusion center--pharm D and Dr. Dos Santos made aware.      Hi Effie,     We received a voicemail from you yesterday requesting to have this pt scheduled for 3 additional days of Zarxio.  Unfortunately her insurance has denied the previous injections therefore we are unable to accommodate her at this time. Please follow up on the denial message that was sent to your clinic on 8/1/25.  If we can be of any further assistance please do not hesitate to contact us.     Thank you,   [facility] Infusion Scheduling     ----- Message from Pharmacist Sana sent at 8/4/2025  3:34 PM CDT -----  Therapy plan in.   ----- Message -----  From: Effie Cummings RN  Sent: 8/4/2025   3:14 PM CDT  To: Abdominal Transplant Pharmacists    Please add therapy plan for Neupogen--in [place]. Kiara said last week she had a CRM that insurance was requesting a peer to peer if she needed another round of it.     ----- Message -----  From: Colton Dos Santos Jr., MD  Sent: 8/4/2025   2:57 PM CDT  [facility]    If no infectious symptoms, can manage outpatient. Keep MMF on hold. Hold bactrim for 5 days. Keep on valgan prophy. Give gcsf 3x. Repeat cbc with diff in 1 week with tac trough. Few labs pending  ----- Message -----  From: Lab, Background User  Sent: 8/4/2025   9:55 AM CDT  To: Colton Dos Santos Jr., MD

## 2025-08-07 DIAGNOSIS — D63.1 ANEMIA IN CHRONIC KIDNEY DISEASE, ON CHRONIC DIALYSIS: ICD-10-CM

## 2025-08-07 DIAGNOSIS — N18.6 ANEMIA IN CHRONIC KIDNEY DISEASE, ON CHRONIC DIALYSIS: ICD-10-CM

## 2025-08-07 DIAGNOSIS — Z99.2 ANEMIA IN CHRONIC KIDNEY DISEASE, ON CHRONIC DIALYSIS: ICD-10-CM

## 2025-08-07 RX ORDER — EPOETIN ALFA-EPBX 10000 [IU]/ML
10000 INJECTION, SOLUTION INTRAVENOUS; SUBCUTANEOUS
Qty: 4 ML | Refills: 0 | Status: CANCELLED | OUTPATIENT
Start: 2025-08-07 | End: 2025-08-29

## 2025-08-08 LAB — W COPPER: 1049 UG/L

## 2025-08-11 ENCOUNTER — LAB VISIT (OUTPATIENT)
Dept: LAB | Facility: HOSPITAL | Age: 25
End: 2025-08-11
Attending: STUDENT IN AN ORGANIZED HEALTH CARE EDUCATION/TRAINING PROGRAM
Payer: MEDICARE

## 2025-08-11 DIAGNOSIS — Z94.0 KIDNEY REPLACED BY TRANSPLANT: ICD-10-CM

## 2025-08-11 LAB
ABSOLUTE EOSINOPHIL (OHS): 0.15 K/UL
ABSOLUTE MONOCYTE (OHS): 0.36 K/UL (ref 0.3–1)
ABSOLUTE NEUTROPHIL COUNT (OHS): 1.67 K/UL (ref 1.8–7.7)
BASOPHILS # BLD AUTO: 0.09 K/UL
BASOPHILS NFR BLD AUTO: 2.8 %
ERYTHROCYTE [DISTWIDTH] IN BLOOD BY AUTOMATED COUNT: 14.1 % (ref 11.5–14.5)
HCT VFR BLD AUTO: 40.7 % (ref 37–48.5)
HGB BLD-MCNC: 12.6 GM/DL (ref 12–16)
IMM GRANULOCYTES # BLD AUTO: 0.15 K/UL (ref 0–0.04)
IMM GRANULOCYTES NFR BLD AUTO: 4.6 % (ref 0–0.5)
LYMPHOCYTES # BLD AUTO: 0.82 K/UL (ref 1–4.8)
MCH RBC QN AUTO: 29.4 PG (ref 27–31)
MCHC RBC AUTO-ENTMCNC: 31 G/DL (ref 32–36)
MCV RBC AUTO: 95 FL (ref 82–98)
NUCLEATED RBC (/100WBC) (OHS): 0 /100 WBC
PLATELET # BLD AUTO: 322 K/UL (ref 150–450)
PMV BLD AUTO: 8.9 FL (ref 9.2–12.9)
RBC # BLD AUTO: 4.29 M/UL (ref 4–5.4)
RELATIVE EOSINOPHIL (OHS): 4.6 %
RELATIVE LYMPHOCYTE (OHS): 25.3 % (ref 18–48)
RELATIVE MONOCYTE (OHS): 11.1 % (ref 4–15)
RELATIVE NEUTROPHIL (OHS): 51.6 % (ref 38–73)
WBC # BLD AUTO: 3.24 K/UL (ref 3.9–12.7)

## 2025-08-11 PROCEDURE — 85025 COMPLETE CBC W/AUTO DIFF WBC: CPT

## 2025-08-11 PROCEDURE — 86644 CMV ANTIBODY: CPT

## 2025-08-11 PROCEDURE — 36415 COLL VENOUS BLD VENIPUNCTURE: CPT

## 2025-08-12 LAB — CMV IGG SERPL QL IA: NORMAL

## 2025-08-15 ENCOUNTER — PATIENT MESSAGE (OUTPATIENT)
Dept: TRANSPLANT | Facility: CLINIC | Age: 25
End: 2025-08-15
Payer: COMMERCIAL

## 2025-08-23 ENCOUNTER — NURSE TRIAGE (OUTPATIENT)
Dept: ADMINISTRATIVE | Facility: CLINIC | Age: 25
End: 2025-08-23
Payer: COMMERCIAL

## 2025-08-23 PROBLEM — N17.9 AKI (ACUTE KIDNEY INJURY): Status: ACTIVE | Noted: 2025-08-23

## 2025-08-25 ENCOUNTER — TELEPHONE (OUTPATIENT)
Dept: TRANSPLANT | Facility: CLINIC | Age: 25
End: 2025-08-25
Payer: MEDICARE

## 2025-08-25 PROBLEM — T86.12 FAILED KIDNEY TRANSPLANT: Status: RESOLVED | Noted: 2024-03-28 | Resolved: 2025-08-25

## 2025-08-25 PROBLEM — E21.3 HPTH (HYPERPARATHYROIDISM): Status: RESOLVED | Noted: 2024-03-28 | Resolved: 2025-08-25

## 2025-08-26 PROBLEM — T86.11 ANTIBODY MEDIATED REJECTION OF KIDNEY TRANSPLANT: Status: ACTIVE | Noted: 2025-08-26

## 2025-08-28 PROBLEM — T86.11 ACUTE REJECTION OF KIDNEY TRANSPLANT: Status: ACTIVE | Noted: 2025-08-23

## 2025-08-29 PROBLEM — N17.9 AKI (ACUTE KIDNEY INJURY): Status: ACTIVE | Noted: 2025-08-29

## (undated) DEVICE — ADAPTER HOSE 10FT 8MM

## (undated) DEVICE — SEALER MARYLAND 1 STEP 23CM

## (undated) DEVICE — SUT ETHILON 3-0 FS-1 30

## (undated) DEVICE — Device

## (undated) DEVICE — SUT PROLENE 5-0 36IN C-1

## (undated) DEVICE — PACK SET UP CONVERTORS

## (undated) DEVICE — PUNCH AORTIC 4.0MM 6/CASE

## (undated) DEVICE — STENT RETRV NSNARE 2.4FR 115CM

## (undated) DEVICE — FOLEY BLLN 20FR 3WAY 5CC

## (undated) DEVICE — SET IRR URLGY 2LINE UNIV SPIKE

## (undated) DEVICE — SUT SILK 3-0 STRANDS 30IN

## (undated) DEVICE — GUIDEWIRE STR TIP HIWIRE 150CM

## (undated) DEVICE — ADHESIVE DERMABOND ADVANCED

## (undated) DEVICE — TRAY CATH LAB OMC

## (undated) DEVICE — SOL IRR NACL .9% 3000ML

## (undated) DEVICE — STAPLER SKIN PROXIMATE WIDE

## (undated) DEVICE — SUT 4-0 12-18IN SILK BLACK

## (undated) DEVICE — CLIPPER BLADE MOD 4406 (CAREF)

## (undated) DEVICE — PLUG CATHETER STERILE FOLEY

## (undated) DEVICE — DECANTER FLUID TRNSF WHITE 9IN

## (undated) DEVICE — TOWEL OR XRAY WHITE 17X26IN

## (undated) DEVICE — DRAPE PED LAP SURG 108X77IN

## (undated) DEVICE — SUT PROLENE 4-0 SH BLU 36IN

## (undated) DEVICE — DRAPE SLUSH WARMER WITH DISC

## (undated) DEVICE — DRAIN CHANNEL ROUND 15FR

## (undated) DEVICE — SUT VICRYL + 2-0 36IN CP-1

## (undated) DEVICE — SYR 10CC LUER LOCK

## (undated) DEVICE — ELECTRODE MEGADYNE RETURN DUAL

## (undated) DEVICE — SUT 1 36IN PDS II VIO MONO

## (undated) DEVICE — SUT 4/0 27IN PDS II VIO MON

## (undated) DEVICE — TRAY CYSTO BASIN OMC

## (undated) DEVICE — INSERTS STEALTH FIBRA SIZE 1.

## (undated) DEVICE — SUT 2-0 12-18IN SILK

## (undated) DEVICE — GUIDEWIRE EMERALD 150CM PTFE

## (undated) DEVICE — HEMOSTAT SURGICEL NU-KNIT 6X9

## (undated) DEVICE — UNDERGLOVES BIOGEL PI SIZE 7.5

## (undated) DEVICE — SUT SILK 3-0 SH 18IN BLACK

## (undated) DEVICE — DRAPE BAG ISOLATION 20 X 20

## (undated) DEVICE — SUT PDS BV 6-0

## (undated) DEVICE — TRAY CATH 1-LYR URIMTR 16FR

## (undated) DEVICE — SUT PROLENE 6-0 BV-1 30IN

## (undated) DEVICE — SUT SILK 2-0 STRANDS 30IN

## (undated) DEVICE — PACK KIDNEY TRANSPLANT CUSTOM

## (undated) DEVICE — TIP YANKAUERS BULB NO VENT

## (undated) DEVICE — DRESSING ABSRBNT ISLAND 3.6X8

## (undated) DEVICE — SYR ONLY LUER LOCK 20CC

## (undated) DEVICE — PACK CYSTOSCOPY III SIRUS

## (undated) DEVICE — EVACUATOR WOUND BULB 100CC

## (undated) DEVICE — GAUZE SPONGE XRAY 4X4